# Patient Record
Sex: MALE | Race: WHITE | NOT HISPANIC OR LATINO
[De-identification: names, ages, dates, MRNs, and addresses within clinical notes are randomized per-mention and may not be internally consistent; named-entity substitution may affect disease eponyms.]

---

## 2017-06-08 PROBLEM — Z00.00 ENCOUNTER FOR PREVENTIVE HEALTH EXAMINATION: Status: ACTIVE | Noted: 2017-06-08

## 2017-06-14 ENCOUNTER — APPOINTMENT (OUTPATIENT)
Dept: VASCULAR SURGERY | Facility: CLINIC | Age: 82
End: 2017-06-14

## 2021-03-25 ENCOUNTER — EMERGENCY (EMERGENCY)
Facility: HOSPITAL | Age: 86
LOS: 0 days | Discharge: HOME | End: 2021-03-26
Attending: EMERGENCY MEDICINE | Admitting: EMERGENCY MEDICINE
Payer: MEDICARE

## 2021-03-25 VITALS
RESPIRATION RATE: 16 BRPM | HEART RATE: 67 BPM | OXYGEN SATURATION: 100 % | SYSTOLIC BLOOD PRESSURE: 139 MMHG | DIASTOLIC BLOOD PRESSURE: 65 MMHG | TEMPERATURE: 98 F

## 2021-03-25 VITALS
SYSTOLIC BLOOD PRESSURE: 151 MMHG | DIASTOLIC BLOOD PRESSURE: 66 MMHG | HEART RATE: 77 BPM | OXYGEN SATURATION: 97 % | WEIGHT: 115.08 LBS | RESPIRATION RATE: 18 BRPM | TEMPERATURE: 98 F

## 2021-03-25 DIAGNOSIS — Z85.46 PERSONAL HISTORY OF MALIGNANT NEOPLASM OF PROSTATE: ICD-10-CM

## 2021-03-25 DIAGNOSIS — R31.9 HEMATURIA, UNSPECIFIED: ICD-10-CM

## 2021-03-25 LAB
ALBUMIN SERPL ELPH-MCNC: 3.8 G/DL — SIGNIFICANT CHANGE UP (ref 3.5–5.2)
ALP SERPL-CCNC: 47 U/L — SIGNIFICANT CHANGE UP (ref 30–115)
ALT FLD-CCNC: 12 U/L — SIGNIFICANT CHANGE UP (ref 0–41)
ANION GAP SERPL CALC-SCNC: 11 MMOL/L — SIGNIFICANT CHANGE UP (ref 7–14)
APPEARANCE UR: ABNORMAL
AST SERPL-CCNC: 13 U/L — SIGNIFICANT CHANGE UP (ref 0–41)
BACTERIA # UR AUTO: NEGATIVE — SIGNIFICANT CHANGE UP
BASOPHILS # BLD AUTO: 0.06 K/UL — SIGNIFICANT CHANGE UP (ref 0–0.2)
BASOPHILS NFR BLD AUTO: 1 % — SIGNIFICANT CHANGE UP (ref 0–1)
BILIRUB SERPL-MCNC: <0.2 MG/DL — SIGNIFICANT CHANGE UP (ref 0.2–1.2)
BILIRUB UR-MCNC: NEGATIVE — SIGNIFICANT CHANGE UP
BUN SERPL-MCNC: 21 MG/DL — HIGH (ref 10–20)
CALCIUM SERPL-MCNC: 8.8 MG/DL — SIGNIFICANT CHANGE UP (ref 8.5–10.1)
CHLORIDE SERPL-SCNC: 103 MMOL/L — SIGNIFICANT CHANGE UP (ref 98–110)
CO2 SERPL-SCNC: 23 MMOL/L — SIGNIFICANT CHANGE UP (ref 17–32)
COLOR SPEC: ABNORMAL
CREAT SERPL-MCNC: 0.7 MG/DL — SIGNIFICANT CHANGE UP (ref 0.7–1.5)
DIFF PNL FLD: ABNORMAL
EOSINOPHIL # BLD AUTO: 0.1 K/UL — SIGNIFICANT CHANGE UP (ref 0–0.7)
EOSINOPHIL NFR BLD AUTO: 1.7 % — SIGNIFICANT CHANGE UP (ref 0–8)
EPI CELLS # UR: 2 /HPF — SIGNIFICANT CHANGE UP (ref 0–5)
GLUCOSE SERPL-MCNC: 241 MG/DL — HIGH (ref 70–99)
GLUCOSE UR QL: ABNORMAL
HCT VFR BLD CALC: 36.2 % — LOW (ref 42–52)
HGB BLD-MCNC: 11.3 G/DL — LOW (ref 14–18)
HYALINE CASTS # UR AUTO: 2 /LPF — SIGNIFICANT CHANGE UP (ref 0–7)
IMM GRANULOCYTES NFR BLD AUTO: 0.3 % — SIGNIFICANT CHANGE UP (ref 0.1–0.3)
KETONES UR-MCNC: SIGNIFICANT CHANGE UP
LACTATE SERPL-SCNC: 1.3 MMOL/L — SIGNIFICANT CHANGE UP (ref 0.7–2)
LEUKOCYTE ESTERASE UR-ACNC: NEGATIVE — SIGNIFICANT CHANGE UP
LIDOCAIN IGE QN: 25 U/L — SIGNIFICANT CHANGE UP (ref 7–60)
LYMPHOCYTES # BLD AUTO: 1.47 K/UL — SIGNIFICANT CHANGE UP (ref 1.2–3.4)
LYMPHOCYTES # BLD AUTO: 25 % — SIGNIFICANT CHANGE UP (ref 20.5–51.1)
MCHC RBC-ENTMCNC: 27.9 PG — SIGNIFICANT CHANGE UP (ref 27–31)
MCHC RBC-ENTMCNC: 31.2 G/DL — LOW (ref 32–37)
MCV RBC AUTO: 89.4 FL — SIGNIFICANT CHANGE UP (ref 80–94)
MONOCYTES # BLD AUTO: 0.5 K/UL — SIGNIFICANT CHANGE UP (ref 0.1–0.6)
MONOCYTES NFR BLD AUTO: 8.5 % — SIGNIFICANT CHANGE UP (ref 1.7–9.3)
NEUTROPHILS # BLD AUTO: 3.73 K/UL — SIGNIFICANT CHANGE UP (ref 1.4–6.5)
NEUTROPHILS NFR BLD AUTO: 63.5 % — SIGNIFICANT CHANGE UP (ref 42.2–75.2)
NITRITE UR-MCNC: NEGATIVE — SIGNIFICANT CHANGE UP
NRBC # BLD: 0 /100 WBCS — SIGNIFICANT CHANGE UP (ref 0–0)
PH UR: 6 — SIGNIFICANT CHANGE UP (ref 5–8)
PLATELET # BLD AUTO: 312 K/UL — SIGNIFICANT CHANGE UP (ref 130–400)
POTASSIUM SERPL-MCNC: 4.7 MMOL/L — SIGNIFICANT CHANGE UP (ref 3.5–5)
POTASSIUM SERPL-SCNC: 4.7 MMOL/L — SIGNIFICANT CHANGE UP (ref 3.5–5)
PROT SERPL-MCNC: 6.4 G/DL — SIGNIFICANT CHANGE UP (ref 6–8)
PROT UR-MCNC: ABNORMAL
RBC # BLD: 4.05 M/UL — LOW (ref 4.7–6.1)
RBC # FLD: 13.8 % — SIGNIFICANT CHANGE UP (ref 11.5–14.5)
RBC CASTS # UR COMP ASSIST: >720 /HPF — HIGH (ref 0–4)
SODIUM SERPL-SCNC: 137 MMOL/L — SIGNIFICANT CHANGE UP (ref 135–146)
SP GR SPEC: 1.03 — HIGH (ref 1.01–1.03)
UROBILINOGEN FLD QL: SIGNIFICANT CHANGE UP
WBC # BLD: 5.88 K/UL — SIGNIFICANT CHANGE UP (ref 4.8–10.8)
WBC # FLD AUTO: 5.88 K/UL — SIGNIFICANT CHANGE UP (ref 4.8–10.8)
WBC UR QL: 13 /HPF — HIGH (ref 0–5)

## 2021-03-25 PROCEDURE — 74177 CT ABD & PELVIS W/CONTRAST: CPT | Mod: 26

## 2021-03-25 PROCEDURE — 99284 EMERGENCY DEPT VISIT MOD MDM: CPT

## 2021-03-25 RX ORDER — SODIUM CHLORIDE 9 MG/ML
1000 INJECTION INTRAMUSCULAR; INTRAVENOUS; SUBCUTANEOUS ONCE
Refills: 0 | Status: COMPLETED | OUTPATIENT
Start: 2021-03-25 | End: 2021-03-25

## 2021-03-25 RX ADMIN — SODIUM CHLORIDE 1000 MILLILITER(S): 9 INJECTION INTRAMUSCULAR; INTRAVENOUS; SUBCUTANEOUS at 17:33

## 2021-03-25 NOTE — ED PROVIDER NOTE - NSFOLLOWUPINSTRUCTIONS_ED_ALL_ED_FT
Hematuria    WHAT YOU NEED TO KNOW:    What is hematuria? Hematuria is blood in your urine. Your urine may be bright red to dark brown.    What other signs and symptoms might I have with hematuria?   •Fever      •Nausea and vomiting      •Pain or bruising on your lower back or sides      •Pain or burning when you urinate      •More urination than usual, or the need to urinate right away      •Blood clots in the toilet after you urinate      What causes hematuria? Ask your healthcare provider for more information about these and other causes of hematuria:  •Urinary tract infection      •Kidney or bladder stones      •Swollen prostate      •Kidney disease      •Abdomen or pelvic injury      •Kidney, bladder, or prostate cancer      •Intense exercise      How is hematuria diagnosed? Your healthcare provider will ask when you first saw a change in the color of your urine. Tell him or her about any medical conditions or medicines you take. Some medicines can damage your kidneys or increase your risk for bleeding. You may need any of the following:  •Blood and urine tests may show infection and how well your kidneys are working.      •An ultrasound or CT may show the cause of your hematuria. You may be given contrast liquid to help your urinary tract show up better in the pictures. Tell the healthcare provider if you have ever had an allergic reaction to contrast liquid.      •A cystoscopy may show problems inside your bladder. The cystoscope is a long tube with a lens and a light on the end.      How is hematuria treated? Hematuria may go away without treatment. You may need medicines to treat an infection. Treatment depends on the cause of your hematuria. Ask your healthcare provider for more information about the treatment you may need.    How can I manage my symptoms? Drink liquids as directed. You may need to drink extra liquids to help flush the blood from your body through your urine. Water is the best liquid to drink. Ask how much liquid to drink each day and which liquids are best for you.    When should I seek immediate care?   •You have blood in your urine after a new injury, such as a fall.      •You have severe back or side pain that does not go away with treatment.      When should I call my doctor?   •You are urinating very small amounts or not at all.      •You feel like you cannot empty your bladder.      •You have a fever that gets worse or does not go away with treatment.      •You cannot keep liquids or medicines down.      •Your urine gets darker, even after you drink extra liquids.      •You have questions or concerns about your condition, treatment, or care.      CARE AGREEMENT:    You have the right to help plan your care. Learn about your health condition and how it may be treated. Discuss treatment options with your healthcare providers to decide what care you want to receive. You always have the right to refuse treatment.        © Copyright [employer] 2021

## 2021-03-25 NOTE — ED PROVIDER NOTE - OBJECTIVE STATEMENT
86 year old male with pmhx of dementia, and prostate CA in remission > 10 years ago, presents with hematuria x 1 day. as per family member noticed a few episodes of blood in urine. pt eating normally. no vomiting, diarrhea or fever. acting baseline.

## 2021-03-25 NOTE — ED PROVIDER NOTE - ATTENDING CONTRIBUTION TO CARE
I personally evaluated the patient. I reviewed the Resident’s or Physician Assistant’s note (as assigned above), and agree with the findings and plan except as documented in my note.    please see scribe note

## 2021-03-25 NOTE — ED PROVIDER NOTE - CARE PROVIDER_API CALL
Daniella Mast)  Urology  [address] [place] [address], [address]  [place] [address]  Phone: [phone]  Fax: [phone]  Follow Up Time:

## 2021-03-25 NOTE — ED PROVIDER NOTE - PHYSICAL EXAMINATION
CONST: Well appearing in NAD  EYES: PERRL, EOMI, Sclera and conjunctiva clear.   ENT: No nasal discharge. Oropharynx normal appearing, no erythema or exudates. No abscess or swelling. Uvula midline.  NECK: Non-tender, no meningeal signs. normal ROM. supple   CARD: Normal S1 S2; Normal rate and rhythm  RESP: Equal BS B/L, No wheezes, rhonchi or rales. No distress  GI: Soft, non-tender, non-distended. no cva tenderness. normal BS  MS: Normal ROM in all extremities. No midline spinal tenderness. pulses 2 +. no calf tenderness or swelling  SKIN: Warm, dry, no acute rashes. Good turgor  NEURO: A&O, No focal deficits. Strength 5/5 with no sensory deficits. Steady gait.

## 2021-03-25 NOTE — ED PROVIDER NOTE - PROGRESS NOTE DETAILS
Attending Note: I personally evaluated the patient. I reviewed the  Physician Assistant’s note (as assigned above), and agree with the findings and plan except as documented in my note.  85 y/o M with a hx of prostate CA in remission, presents to ED with hematuria since yesterday. No abdominal pain or flank pain. No nausea, vomiting.    PE: CONSTITUTIONAL: Well-developed; well-nourished; in no acute distress. Sitting up and providing appropriate history and physical examination SKIN: skin exam is warm and dry, no acute rash. HEAD: Normocephalic; atraumatic. EYES: PERRL, 3 mm bilateral, no nystagmus, EOM intact; conjunctiva and sclera clear. ENT: No nasal discharge; airway clear.  NECK: Supple; non tender.+ full passive ROM in all directions. No JVD CARD: S1, S2 normal; no murmurs, gallops, or rubs. Regular rate and rhythm. + Symmetric Strong Pulses  RESP: No wheezes, rales or rhonchi. Good air movement bilaterally ABD: soft; non-distended; non-tender. No Rebound, No Guarding, No signs of peritonitis, No CVA tenderness EXT: Normal ROM. No clubbing, cyanosis or edema. Dp and Pt Pulses intact. Cap refill less than 3 seconds NEURO: CN 2-12 intact, normal finger to nose, normal romberg, stable gait, no sensory or motor deficits, Alert, oriented, grossly unremarkable. No Focal deficits. GCS 15. NIH 0  PSYCH: Cooperative, appropriate.    Plan: Labs, UA, CT of abdomen and pelvis. Reassess. All results discussed with patient and family , will follow up with urology All results discussed with patient and family , will follow up with urology. Full DC instructions discussed and patient knows when to seek immediate medical attention. Patient has proper follow-up. All results discussed with the patient they may require further work-up. Limitations of ED work-up discussed. All  questions and concerns from patient or family addressed. Understanding of insturctions verbalized

## 2021-03-25 NOTE — ED PROVIDER NOTE - NS ED ROS FT
Review of Systems:  	•	CONSTITUTIONAL - no fever, no diaphoresis, no chills  	•	SKIN - no rash  	•	HEMATOLOGIC - no bleeding, no bruising  	•	EYES - no eye pain, no blurry vision  	•	ENT - no change in hearing, no sore throat, no ear pain or tinnitus  	•	RESPIRATORY - no shortness of breath, no cough  	•	CARDIAC - no chest pain, no palpitations  	•	GI - no abd pain, no nausea, no vomiting, no diarrhea, no constipation  	•	GENITO-URINARY - no discharge, no dysuria; + hematuria, no increased urinary frequency  	•	MUSCULOSKELETAL - no joint paint, no swelling, no redness  	•	NEUROLOGIC - no weakness, no headache, no paresthesias, no LOC

## 2021-03-25 NOTE — ED PROVIDER NOTE - PATIENT PORTAL LINK FT
You can access the FollowMyHealth Patient Portal offered by [facility] by registering at the following website: [email or web address]. By joining [employer]’s FollowMyHealth portal, you will also be able to view your health information using other applications (apps) compatible with our system.

## 2021-03-25 NOTE — ED ADULT NURSE NOTE - OBJECTIVE STATEMENT
Pt presents to ED with daughter with c/o hematuria x2-3 days. Daughter states hematuria worsened in the last 1 day. Pt denies abdominal pain, fever, N/V/D.

## 2021-03-26 LAB
CULTURE RESULTS: NO GROWTH — SIGNIFICANT CHANGE UP
SPECIMEN SOURCE: SIGNIFICANT CHANGE UP

## 2021-05-02 ENCOUNTER — INPATIENT (INPATIENT)
Facility: HOSPITAL | Age: 86
LOS: 1 days | Discharge: HOME HEALTH | End: 2021-05-04
Attending: INTERNAL MEDICINE | Admitting: INTERNAL MEDICINE
Payer: MEDICARE

## 2021-05-02 VITALS
DIASTOLIC BLOOD PRESSURE: 60 MMHG | WEIGHT: 124.34 LBS | RESPIRATION RATE: 20 BRPM | SYSTOLIC BLOOD PRESSURE: 129 MMHG | TEMPERATURE: 98 F | HEART RATE: 76 BPM | OXYGEN SATURATION: 99 % | HEIGHT: 61 IN

## 2021-05-02 PROBLEM — C61 MALIGNANT NEOPLASM OF PROSTATE: Chronic | Status: ACTIVE | Noted: 2021-03-25

## 2021-05-02 PROBLEM — F03.90 UNSPECIFIED DEMENTIA WITHOUT BEHAVIORAL DISTURBANCE: Chronic | Status: ACTIVE | Noted: 2021-03-25

## 2021-05-02 LAB
ALBUMIN SERPL ELPH-MCNC: 4.4 G/DL — SIGNIFICANT CHANGE UP (ref 3.5–5.2)
ALP SERPL-CCNC: 83 U/L — SIGNIFICANT CHANGE UP (ref 30–115)
ALT FLD-CCNC: 8 U/L — SIGNIFICANT CHANGE UP (ref 0–41)
ANION GAP SERPL CALC-SCNC: 11 MMOL/L — SIGNIFICANT CHANGE UP (ref 7–14)
APPEARANCE UR: CLEAR — SIGNIFICANT CHANGE UP
AST SERPL-CCNC: 7 U/L — SIGNIFICANT CHANGE UP (ref 0–41)
B-OH-BUTYR SERPL-SCNC: <0.2 MMOL/L — SIGNIFICANT CHANGE UP
BASE EXCESS BLDV CALC-SCNC: 4.8 MMOL/L — HIGH (ref -2–2)
BASOPHILS # BLD AUTO: 0.07 K/UL — SIGNIFICANT CHANGE UP (ref 0–0.2)
BASOPHILS NFR BLD AUTO: 1 % — SIGNIFICANT CHANGE UP (ref 0–1)
BILIRUB SERPL-MCNC: 0.4 MG/DL — SIGNIFICANT CHANGE UP (ref 0.2–1.2)
BILIRUB UR-MCNC: NEGATIVE — SIGNIFICANT CHANGE UP
BUN SERPL-MCNC: 23 MG/DL — HIGH (ref 10–20)
CA-I SERPL-SCNC: 1.18 MMOL/L — SIGNIFICANT CHANGE UP (ref 1.12–1.3)
CALCIUM SERPL-MCNC: 9.6 MG/DL — SIGNIFICANT CHANGE UP (ref 8.5–10.1)
CHLORIDE SERPL-SCNC: 95 MMOL/L — LOW (ref 98–110)
CO2 SERPL-SCNC: 24 MMOL/L — SIGNIFICANT CHANGE UP (ref 17–32)
COLOR SPEC: SIGNIFICANT CHANGE UP
CREAT SERPL-MCNC: 0.9 MG/DL — SIGNIFICANT CHANGE UP (ref 0.7–1.5)
DIFF PNL FLD: NEGATIVE — SIGNIFICANT CHANGE UP
EOSINOPHIL # BLD AUTO: 0.08 K/UL — SIGNIFICANT CHANGE UP (ref 0–0.7)
EOSINOPHIL NFR BLD AUTO: 1.2 % — SIGNIFICANT CHANGE UP (ref 0–8)
GAS PNL BLDV: 133 MMOL/L — LOW (ref 136–145)
GAS PNL BLDV: SIGNIFICANT CHANGE UP
GLUCOSE BLDC GLUCOMTR-MCNC: 182 MG/DL — HIGH (ref 70–99)
GLUCOSE BLDC GLUCOMTR-MCNC: 252 MG/DL — HIGH (ref 70–99)
GLUCOSE BLDC GLUCOMTR-MCNC: 311 MG/DL — HIGH (ref 70–99)
GLUCOSE SERPL-MCNC: 551 MG/DL — CRITICAL HIGH (ref 70–99)
GLUCOSE UR QL: ABNORMAL
HCO3 BLDV-SCNC: 32 MMOL/L — HIGH (ref 22–29)
HCT VFR BLD CALC: 39.8 % — LOW (ref 42–52)
HCT VFR BLDA CALC: 40.3 % — SIGNIFICANT CHANGE UP (ref 34–44)
HGB BLD CALC-MCNC: 13.1 G/DL — LOW (ref 14–18)
HGB BLD-MCNC: 13.2 G/DL — LOW (ref 14–18)
IMM GRANULOCYTES NFR BLD AUTO: 0.4 % — HIGH (ref 0.1–0.3)
KETONES UR-MCNC: ABNORMAL
LACTATE BLDV-MCNC: 1.3 MMOL/L — SIGNIFICANT CHANGE UP (ref 0.5–1.6)
LEUKOCYTE ESTERASE UR-ACNC: NEGATIVE — SIGNIFICANT CHANGE UP
LYMPHOCYTES # BLD AUTO: 1.39 K/UL — SIGNIFICANT CHANGE UP (ref 1.2–3.4)
LYMPHOCYTES # BLD AUTO: 20.7 % — SIGNIFICANT CHANGE UP (ref 20.5–51.1)
MAGNESIUM SERPL-MCNC: 1.9 MG/DL — SIGNIFICANT CHANGE UP (ref 1.8–2.4)
MCHC RBC-ENTMCNC: 28.4 PG — SIGNIFICANT CHANGE UP (ref 27–31)
MCHC RBC-ENTMCNC: 33.2 G/DL — SIGNIFICANT CHANGE UP (ref 32–37)
MCV RBC AUTO: 85.8 FL — SIGNIFICANT CHANGE UP (ref 80–94)
MONOCYTES # BLD AUTO: 0.35 K/UL — SIGNIFICANT CHANGE UP (ref 0.1–0.6)
MONOCYTES NFR BLD AUTO: 5.2 % — SIGNIFICANT CHANGE UP (ref 1.7–9.3)
NEUTROPHILS # BLD AUTO: 4.81 K/UL — SIGNIFICANT CHANGE UP (ref 1.4–6.5)
NEUTROPHILS NFR BLD AUTO: 71.5 % — SIGNIFICANT CHANGE UP (ref 42.2–75.2)
NITRITE UR-MCNC: NEGATIVE — SIGNIFICANT CHANGE UP
NRBC # BLD: 0 /100 WBCS — SIGNIFICANT CHANGE UP (ref 0–0)
OSMOLALITY SERPL: 307 MOS/KG — HIGH (ref 280–301)
PCO2 BLDV: 55 MMHG — HIGH (ref 41–51)
PH BLDV: 7.37 — SIGNIFICANT CHANGE UP (ref 7.26–7.43)
PH UR: 6.5 — SIGNIFICANT CHANGE UP (ref 5–8)
PHOSPHATE SERPL-MCNC: 4.5 MG/DL — SIGNIFICANT CHANGE UP (ref 2.1–4.9)
PLATELET # BLD AUTO: 299 K/UL — SIGNIFICANT CHANGE UP (ref 130–400)
PO2 BLDV: 21 MMHG — SIGNIFICANT CHANGE UP (ref 20–40)
POTASSIUM BLDV-SCNC: 4.9 MMOL/L — SIGNIFICANT CHANGE UP (ref 3.3–5.6)
POTASSIUM SERPL-MCNC: 5.1 MMOL/L — HIGH (ref 3.5–5)
POTASSIUM SERPL-SCNC: 5.1 MMOL/L — HIGH (ref 3.5–5)
PROT SERPL-MCNC: 7.3 G/DL — SIGNIFICANT CHANGE UP (ref 6–8)
PROT UR-MCNC: NEGATIVE — SIGNIFICANT CHANGE UP
RBC # BLD: 4.64 M/UL — LOW (ref 4.7–6.1)
RBC # FLD: 13 % — SIGNIFICANT CHANGE UP (ref 11.5–14.5)
SAO2 % BLDV: 30 % — SIGNIFICANT CHANGE UP
SARS-COV-2 RNA SPEC QL NAA+PROBE: SIGNIFICANT CHANGE UP
SODIUM SERPL-SCNC: 130 MMOL/L — LOW (ref 135–146)
SP GR SPEC: 1.03 — SIGNIFICANT CHANGE UP (ref 1.01–1.03)
UROBILINOGEN FLD QL: SIGNIFICANT CHANGE UP
WBC # BLD: 6.73 K/UL — SIGNIFICANT CHANGE UP (ref 4.8–10.8)
WBC # FLD AUTO: 6.73 K/UL — SIGNIFICANT CHANGE UP (ref 4.8–10.8)

## 2021-05-02 PROCEDURE — 99223 1ST HOSP IP/OBS HIGH 75: CPT

## 2021-05-02 PROCEDURE — 93010 ELECTROCARDIOGRAM REPORT: CPT

## 2021-05-02 PROCEDURE — 99285 EMERGENCY DEPT VISIT HI MDM: CPT | Mod: CS,GC

## 2021-05-02 PROCEDURE — 70450 CT HEAD/BRAIN W/O DYE: CPT | Mod: 26

## 2021-05-02 PROCEDURE — 71045 X-RAY EXAM CHEST 1 VIEW: CPT | Mod: 26

## 2021-05-02 RX ORDER — HALOPERIDOL DECANOATE 100 MG/ML
1 INJECTION INTRAMUSCULAR ONCE
Refills: 0 | Status: COMPLETED | OUTPATIENT
Start: 2021-05-02 | End: 2021-05-02

## 2021-05-02 RX ORDER — SODIUM CHLORIDE 9 MG/ML
1000 INJECTION, SOLUTION INTRAVENOUS ONCE
Refills: 0 | Status: COMPLETED | OUTPATIENT
Start: 2021-05-02 | End: 2021-05-02

## 2021-05-02 RX ORDER — HUMAN INSULIN 100 [IU]/ML
15 INJECTION, SUSPENSION SUBCUTANEOUS
Refills: 0 | Status: DISCONTINUED | OUTPATIENT
Start: 2021-05-02 | End: 2021-05-03

## 2021-05-02 RX ORDER — INSULIN LISPRO 100/ML
5 VIAL (ML) SUBCUTANEOUS
Refills: 0 | Status: DISCONTINUED | OUTPATIENT
Start: 2021-05-02 | End: 2021-05-04

## 2021-05-02 RX ORDER — SODIUM CHLORIDE 9 MG/ML
1000 INJECTION, SOLUTION INTRAVENOUS
Refills: 0 | Status: DISCONTINUED | OUTPATIENT
Start: 2021-05-02 | End: 2021-05-04

## 2021-05-02 RX ORDER — DEXTROSE 50 % IN WATER 50 %
15 SYRINGE (ML) INTRAVENOUS ONCE
Refills: 0 | Status: DISCONTINUED | OUTPATIENT
Start: 2021-05-02 | End: 2021-05-04

## 2021-05-02 RX ORDER — DEXTROSE 50 % IN WATER 50 %
25 SYRINGE (ML) INTRAVENOUS ONCE
Refills: 0 | Status: DISCONTINUED | OUTPATIENT
Start: 2021-05-02 | End: 2021-05-04

## 2021-05-02 RX ORDER — LANOLIN ALCOHOL/MO/W.PET/CERES
5 CREAM (GRAM) TOPICAL AT BEDTIME
Refills: 0 | Status: DISCONTINUED | OUTPATIENT
Start: 2021-05-02 | End: 2021-05-04

## 2021-05-02 RX ORDER — DEXTROSE 50 % IN WATER 50 %
12.5 SYRINGE (ML) INTRAVENOUS ONCE
Refills: 0 | Status: DISCONTINUED | OUTPATIENT
Start: 2021-05-02 | End: 2021-05-04

## 2021-05-02 RX ORDER — INSULIN LISPRO 100/ML
VIAL (ML) SUBCUTANEOUS
Refills: 0 | Status: DISCONTINUED | OUTPATIENT
Start: 2021-05-02 | End: 2021-05-04

## 2021-05-02 RX ORDER — INSULIN HUMAN 100 [IU]/ML
10 INJECTION, SOLUTION SUBCUTANEOUS ONCE
Refills: 0 | Status: COMPLETED | OUTPATIENT
Start: 2021-05-02 | End: 2021-05-02

## 2021-05-02 RX ORDER — HALOPERIDOL DECANOATE 100 MG/ML
1 INJECTION INTRAMUSCULAR EVERY 6 HOURS
Refills: 0 | Status: DISCONTINUED | OUTPATIENT
Start: 2021-05-02 | End: 2021-05-04

## 2021-05-02 RX ORDER — HALOPERIDOL DECANOATE 100 MG/ML
1 INJECTION INTRAMUSCULAR
Qty: 0 | Refills: 0 | DISCHARGE

## 2021-05-02 RX ORDER — ENOXAPARIN SODIUM 100 MG/ML
40 INJECTION SUBCUTANEOUS DAILY
Refills: 0 | Status: DISCONTINUED | OUTPATIENT
Start: 2021-05-02 | End: 2021-05-02

## 2021-05-02 RX ORDER — ENOXAPARIN SODIUM 100 MG/ML
40 INJECTION SUBCUTANEOUS DAILY
Refills: 0 | Status: DISCONTINUED | OUTPATIENT
Start: 2021-05-02 | End: 2021-05-04

## 2021-05-02 RX ORDER — GLUCAGON INJECTION, SOLUTION 0.5 MG/.1ML
1 INJECTION, SOLUTION SUBCUTANEOUS ONCE
Refills: 0 | Status: DISCONTINUED | OUTPATIENT
Start: 2021-05-02 | End: 2021-05-04

## 2021-05-02 RX ADMIN — SODIUM CHLORIDE 1000 MILLILITER(S): 9 INJECTION, SOLUTION INTRAVENOUS at 12:17

## 2021-05-02 RX ADMIN — SODIUM CHLORIDE 1000 MILLILITER(S): 9 INJECTION, SOLUTION INTRAVENOUS at 11:11

## 2021-05-02 RX ADMIN — SODIUM CHLORIDE 1000 MILLILITER(S): 9 INJECTION, SOLUTION INTRAVENOUS at 11:30

## 2021-05-02 RX ADMIN — INSULIN HUMAN 10 UNIT(S): 100 INJECTION, SOLUTION SUBCUTANEOUS at 12:48

## 2021-05-02 RX ADMIN — HALOPERIDOL DECANOATE 1 MILLIGRAM(S): 100 INJECTION INTRAMUSCULAR at 22:02

## 2021-05-02 RX ADMIN — Medication 5 UNIT(S): at 18:22

## 2021-05-02 RX ADMIN — Medication 4: at 18:22

## 2021-05-02 RX ADMIN — HALOPERIDOL DECANOATE 1 MILLIGRAM(S): 100 INJECTION INTRAMUSCULAR at 15:44

## 2021-05-02 NOTE — H&P ADULT - ASSESSMENT
86y M pmh insulin dependent diabetes, prostate ca in remission, late stage alzheimer's presenting for elevated sugars x1 week.    # Hyperglycemia, no HHS or DKA   - pt's DM medication regimen needs to be changed as he is only getting long acting insulin in am  - check hba1c, lipid profile  - FS in am and pre-meals  - beta-hydroxy unremarkable  - small ketones in urine  - serum osm 307, pH 7.37, cCo2 elevated 55  - lipase unremarkable, UA negative   - s/p 10 U IV insulin in ED  - insulin long acting 15 qhs, 5 pre-meals tid     # advanced dementia   - c/w aricept, daughter unsure of dose. Does not recall pharmacy  - will have to clarify dose   - pt has episodes of agitation. Give Haldol IM 1 mg prn   - Melatonin 5 at night     # h/o prostate ca s/p RT    Diet: DASH/carb consistent   DVT ppx: lovenox   Dispo: d/c in 24 hours .

## 2021-05-02 NOTE — ED ADULT NURSE REASSESSMENT NOTE - NS ED NURSE REASSESS COMMENT FT1
Pt restless/aggresive towards staff. Pt pulled out PVL. Daughter at bedside. MD Krishna at bedside. 1:1 initiated.

## 2021-05-02 NOTE — ED PROVIDER NOTE - CLINICAL SUMMARY MEDICAL DECISION MAKING FREE TEXT BOX
no dka on labs. ua/cth neg. admit for further work up and treatment of ams, uncontrolled hyperglycemia

## 2021-05-02 NOTE — ED ADULT NURSE NOTE - NSIMPLEMENTINTERV_GEN_ALL_ED
Implemented All Fall Risk Interventions:  [place] to call system. Call bell, personal items and telephone within reach. Instruct patient to call for assistance. Room bathroom lighting operational. Non-slip footwear when patient is off stretcher. Physically safe environment: no spills, clutter or unnecessary equipment. Stretcher in lowest position, wheels locked, appropriate side rails in place. Provide visual cue, wrist band, yellow gown, etc. Monitor gait and stability. Monitor for mental status changes and reorient to person, place, and time. Review medications for side effects contributing to fall risk. Reinforce activity limits and safety measures with patient and family.

## 2021-05-02 NOTE — H&P ADULT - NSHPSOCIALHISTORY_GEN_ALL_CORE
EtOH use many years prior   Denies smoking, drug use     Lives at home with daughter who is his home health aide.  He wonders outside often, has moments of confusion often

## 2021-05-02 NOTE — H&P ADULT - NSHPLABSRESULTS_GEN_ALL_CORE
Daily Height in cm: 154.94 (02 May 2021 10:50)    Daily     LABS:                        13.2   6.73  )-----------( 299      ( 02 May 2021 11:19 )             39.8     05-02    130<L>  |  95<L>  |  23<H>  ----------------------------<  551<HH>  5.1<H>   |  24  |  0.9    Ca    9.6      02 May 2021 11:19  Phos  4.5     05-  Mg     1.9     05-    TPro  7.3  /  Alb  4.4  /  TBili  0.4  /  DBili  x   /  AST  7   /  ALT  8   /  AlkPhos  83  05-02      Urinalysis Basic - ( 02 May 2021 13:45 )    Color: Light Yellow / Appearance: Clear / S.030 / pH: x  Gluc: x / Ketone: Small  / Bili: Negative / Urobili: <2 mg/dL   Blood: x / Protein: Negative / Nitrite: Negative   Leuk Esterase: Negative / RBC: x / WBC x   Sq Epi: x / Non Sq Epi: x / Bacteria: x      CAPILLARY BLOOD GLUCOSE      POCT Blood Glucose.: 252 mg/dL (02 May 2021 14:43)  POCT Blood Glucose.: 542 mg/dL (02 May 2021 12:41)  POCT Blood Glucose.: >600 mg/dL (02 May 2021 10:51)      RADIOLOGY & ADDITIONAL TESTS:

## 2021-05-02 NOTE — ED PROVIDER NOTE - NS ED ROS FT
Eyes:  No visual changes, eye pain or discharge.  ENMT:  No hearing changes, pain, no sore throat or runny nose, no difficulty swallowing  Cardiac:  No chest pain, SOB or edema. No chest pain with exertion.  Respiratory:  No cough or respiratory distress. No hemoptysis. No history of asthma or RAD.  GI:  No nausea, vomiting, diarrhea or abdominal pain.  :  see HPI  MS:  No myalgia, muscle weakness, joint pain or back pain.  Neuro:  No headache or weakness.  No LOC.  Skin:  No skin rash.   Endocrine: No history of thyroid disease or diabetes.

## 2021-05-02 NOTE — ED ADULT NURSE REASSESSMENT NOTE - NS ED NURSE REASSESS COMMENT FT1
MD Krishna at bedside. MD Krishna made aware of pt pt kicking PCA on chest. Charge RN Breanna made aware. As per MD "Give 1mg Haldol". Pt actively punching and scratching staff .

## 2021-05-02 NOTE — ED PROVIDER NOTE - OBJECTIVE STATEMENT
86y M pmh insulin dependent diabetes, prostate ca in remission, alzheimer's presenting for elevated sugars x1 week. Hx provided by daughter who says his sugars whenever he checks has been consistently over 500. Has been giving him long acting insulin in the morning 35-40 units. When she gave him higher than 40 last week, he had 2 episodes of hypoglycemia when his sugar was 30. +episodes of incontinence which is new/different for him. Increase in confusion. But no f/c/n/v. No cough/sore throat. No chest pain/sob. No recent travel/sick contacts.

## 2021-05-02 NOTE — H&P ADULT - HISTORY OF PRESENT ILLNESS
86y M pmh insulin dependent diabetes, prostate ca in remission, late stage alzheimer's presenting for elevated sugars x1 week. Hx provided by daughter who says his sugars whenever he checks has been consistently over 500. Has been giving him long acting insulin in the morning 35-40 units. When she gave him higher than 40 last week, he had 2 episodes of hypoglycemia when his sugar was 30. +episodes of incontinence which is new/different for him. Increase in confusion. According to his daughter, the pt has not experienced any fevers, chills, denies n/v, sick contacts, any complaints.     In the ED, BG was >600. 10 U IV Insulin given, rpt . The pt was given IVF 2L bolus. While in ED, pt had agressive episode and kicked the PCA. He was given Haldol 1 mg x2.       ED Vital Signs Last 24 Hrs  T(C): 36.7 (02 May 2021 10:50), Max: 36.7 (02 May 2021 10:50)  T(F): 98 (02 May 2021 10:50), Max: 98 (02 May 2021 10:50)  HR: 86 (02 May 2021 14:18) (66 - 86)  BP: 135/74 (02 May 2021 14:18) (129/60 - 150/67)  RR: 18 (02 May 2021 14:18) (18 - 20)  SpO2: 98% (02 May 2021 14:18) (98% - 99%)

## 2021-05-02 NOTE — H&P ADULT - NSHPPHYSICALEXAM_GEN_ALL_CORE
GENERAL: confused, agitated   HEAD:  Atraumatic, Normocephalic  EYES: EOMI, PERRLA, conjunctiva and sclera clear  NECK: Supple, no lymphadenopathy, no JVD  CHEST/LUNG: CTAB; No wheezes, rales, or rhonchi  HEART: Regular rate and rhythm; No murmurs, rubs, or gallops  ABDOMEN: Soft, non-tender, non-distended; normal bowel sounds, no organomegaly  EXTREMITIES:  2+ peripheral pulses b/l, No clubbing, cyanosis, or edema  NEUROLOGY: A&O x 1, no focal deficits  SKIN: No rashes or lesions

## 2021-05-02 NOTE — ED ADULT NURSE REASSESSMENT NOTE - NS ED NURSE REASSESS COMMENT FT1
MD Ribera made aware, security at bedside. Pt to be restraint. Pt unable to be redirected. family at bedside.

## 2021-05-02 NOTE — ED PROVIDER NOTE - ATTENDING CONTRIBUTION TO CARE
86M pmh advanced dementia, IDDM, presents for uncontrolled hyperglycemia x 1 week. assoc w confusion, urinating on the floor which is unusual for him. daughter at bedside giving H&P due to pt's dementia and translating Slovak. no fever, cough. no n/v. no pain to body. no fall. no loc. no dysuria, freq, hematuria.     on exam, AFVSS, well ginny nad, ncat, eomi, perrla, mmm, lctab, rrr nl s1s2 no mrg, abd soft ntnd, alert, no focal deficits, no le edema or calf ttp,     a/p; Hyperglycemia r/o DKA. ams, delirium vs dementia. will do labs ekg, cxr, UA/Ucx, CTH, ivf, insulin re-eval.

## 2021-05-03 ENCOUNTER — TRANSCRIPTION ENCOUNTER (OUTPATIENT)
Age: 86
End: 2021-05-03

## 2021-05-03 LAB
A1C WITH ESTIMATED AVERAGE GLUCOSE RESULT: 12.1 % — HIGH (ref 4–5.6)
ALBUMIN SERPL ELPH-MCNC: 3.9 G/DL — SIGNIFICANT CHANGE UP (ref 3.5–5.2)
ALP SERPL-CCNC: 80 U/L — SIGNIFICANT CHANGE UP (ref 30–115)
ALT FLD-CCNC: 9 U/L — SIGNIFICANT CHANGE UP (ref 0–41)
ANION GAP SERPL CALC-SCNC: 14 MMOL/L — SIGNIFICANT CHANGE UP (ref 7–14)
AST SERPL-CCNC: 15 U/L — SIGNIFICANT CHANGE UP (ref 0–41)
BASOPHILS # BLD AUTO: 0.09 K/UL — SIGNIFICANT CHANGE UP (ref 0–0.2)
BASOPHILS NFR BLD AUTO: 1.1 % — HIGH (ref 0–1)
BILIRUB SERPL-MCNC: 0.5 MG/DL — SIGNIFICANT CHANGE UP (ref 0.2–1.2)
BUN SERPL-MCNC: 15 MG/DL — SIGNIFICANT CHANGE UP (ref 10–20)
CALCIUM SERPL-MCNC: 8.8 MG/DL — SIGNIFICANT CHANGE UP (ref 8.5–10.1)
CHLORIDE SERPL-SCNC: 99 MMOL/L — SIGNIFICANT CHANGE UP (ref 98–110)
CHOLEST SERPL-MCNC: 141 MG/DL — SIGNIFICANT CHANGE UP
CO2 SERPL-SCNC: 23 MMOL/L — SIGNIFICANT CHANGE UP (ref 17–32)
COVID-19 SPIKE DOMAIN AB INTERP: NEGATIVE — SIGNIFICANT CHANGE UP
COVID-19 SPIKE DOMAIN ANTIBODY RESULT: 0.4 U/ML — SIGNIFICANT CHANGE UP
CREAT SERPL-MCNC: 0.9 MG/DL — SIGNIFICANT CHANGE UP (ref 0.7–1.5)
CULTURE RESULTS: SIGNIFICANT CHANGE UP
EOSINOPHIL # BLD AUTO: 0.06 K/UL — SIGNIFICANT CHANGE UP (ref 0–0.7)
EOSINOPHIL NFR BLD AUTO: 0.8 % — SIGNIFICANT CHANGE UP (ref 0–8)
ESTIMATED AVERAGE GLUCOSE: 301 MG/DL — HIGH (ref 68–114)
GLUCOSE BLDC GLUCOMTR-MCNC: 289 MG/DL — HIGH (ref 70–99)
GLUCOSE BLDC GLUCOMTR-MCNC: 304 MG/DL — HIGH (ref 70–99)
GLUCOSE BLDC GLUCOMTR-MCNC: 388 MG/DL — HIGH (ref 70–99)
GLUCOSE BLDC GLUCOMTR-MCNC: 75 MG/DL — SIGNIFICANT CHANGE UP (ref 70–99)
GLUCOSE SERPL-MCNC: 328 MG/DL — HIGH (ref 70–99)
HCT VFR BLD CALC: 40.8 % — LOW (ref 42–52)
HDLC SERPL-MCNC: 49 MG/DL — SIGNIFICANT CHANGE UP
HGB BLD-MCNC: 13.5 G/DL — LOW (ref 14–18)
IMM GRANULOCYTES NFR BLD AUTO: 0.4 % — HIGH (ref 0.1–0.3)
LIPID PNL WITH DIRECT LDL SERPL: 86 MG/DL — SIGNIFICANT CHANGE UP
LYMPHOCYTES # BLD AUTO: 1.2 K/UL — SIGNIFICANT CHANGE UP (ref 1.2–3.4)
LYMPHOCYTES # BLD AUTO: 15.2 % — LOW (ref 20.5–51.1)
MAGNESIUM SERPL-MCNC: 1.6 MG/DL — LOW (ref 1.8–2.4)
MCHC RBC-ENTMCNC: 28.4 PG — SIGNIFICANT CHANGE UP (ref 27–31)
MCHC RBC-ENTMCNC: 33.1 G/DL — SIGNIFICANT CHANGE UP (ref 32–37)
MCV RBC AUTO: 85.7 FL — SIGNIFICANT CHANGE UP (ref 80–94)
MONOCYTES # BLD AUTO: 0.42 K/UL — SIGNIFICANT CHANGE UP (ref 0.1–0.6)
MONOCYTES NFR BLD AUTO: 5.3 % — SIGNIFICANT CHANGE UP (ref 1.7–9.3)
NEUTROPHILS # BLD AUTO: 6.11 K/UL — SIGNIFICANT CHANGE UP (ref 1.4–6.5)
NEUTROPHILS NFR BLD AUTO: 77.2 % — HIGH (ref 42.2–75.2)
NON HDL CHOLESTEROL: 92 MG/DL — SIGNIFICANT CHANGE UP
NRBC # BLD: 0 /100 WBCS — SIGNIFICANT CHANGE UP (ref 0–0)
PLATELET # BLD AUTO: 302 K/UL — SIGNIFICANT CHANGE UP (ref 130–400)
POTASSIUM SERPL-MCNC: 5 MMOL/L — SIGNIFICANT CHANGE UP (ref 3.5–5)
POTASSIUM SERPL-SCNC: 5 MMOL/L — SIGNIFICANT CHANGE UP (ref 3.5–5)
PROT SERPL-MCNC: 6.8 G/DL — SIGNIFICANT CHANGE UP (ref 6–8)
RBC # BLD: 4.76 M/UL — SIGNIFICANT CHANGE UP (ref 4.7–6.1)
RBC # FLD: 13 % — SIGNIFICANT CHANGE UP (ref 11.5–14.5)
SARS-COV-2 IGG+IGM SERPL QL IA: 0.4 U/ML — SIGNIFICANT CHANGE UP
SARS-COV-2 IGG+IGM SERPL QL IA: NEGATIVE — SIGNIFICANT CHANGE UP
SODIUM SERPL-SCNC: 136 MMOL/L — SIGNIFICANT CHANGE UP (ref 135–146)
SPECIMEN SOURCE: SIGNIFICANT CHANGE UP
TRIGL SERPL-MCNC: 61 MG/DL — SIGNIFICANT CHANGE UP
WBC # BLD: 7.91 K/UL — SIGNIFICANT CHANGE UP (ref 4.8–10.8)
WBC # FLD AUTO: 7.91 K/UL — SIGNIFICANT CHANGE UP (ref 4.8–10.8)

## 2021-05-03 PROCEDURE — 99233 SBSQ HOSP IP/OBS HIGH 50: CPT

## 2021-05-03 RX ORDER — INSULIN GLARGINE 100 [IU]/ML
15 INJECTION, SOLUTION SUBCUTANEOUS AT BEDTIME
Refills: 0 | Status: DISCONTINUED | OUTPATIENT
Start: 2021-05-03 | End: 2021-05-03

## 2021-05-03 RX ORDER — INSULIN GLARGINE 100 [IU]/ML
15 INJECTION, SOLUTION SUBCUTANEOUS EVERY MORNING
Refills: 0 | Status: DISCONTINUED | OUTPATIENT
Start: 2021-05-03 | End: 2021-05-04

## 2021-05-03 RX ORDER — POLYETHYLENE GLYCOL 3350 17 G/17G
17 POWDER, FOR SOLUTION ORAL DAILY
Refills: 0 | Status: DISCONTINUED | OUTPATIENT
Start: 2021-05-03 | End: 2021-05-04

## 2021-05-03 RX ADMIN — INSULIN GLARGINE 15 UNIT(S): 100 INJECTION, SOLUTION SUBCUTANEOUS at 10:10

## 2021-05-03 RX ADMIN — Medication 5 UNIT(S): at 08:24

## 2021-05-03 RX ADMIN — ENOXAPARIN SODIUM 40 MILLIGRAM(S): 100 INJECTION SUBCUTANEOUS at 11:49

## 2021-05-03 RX ADMIN — Medication 5: at 11:49

## 2021-05-03 RX ADMIN — Medication 4: at 16:35

## 2021-05-03 RX ADMIN — Medication 5 UNIT(S): at 16:35

## 2021-05-03 RX ADMIN — Medication 3: at 08:25

## 2021-05-03 RX ADMIN — Medication 5 UNIT(S): at 11:49

## 2021-05-03 RX ADMIN — POLYETHYLENE GLYCOL 3350 17 GRAM(S): 17 POWDER, FOR SOLUTION ORAL at 15:16

## 2021-05-03 NOTE — DISCHARGE NOTE NURSING/CASE MANAGEMENT/SOCIAL WORK - PATIENT PORTAL LINK FT
You can access the FollowMyHealth Patient Portal offered by [facility] by registering at the following website: [email or web address]. By joining [employer]’s FollowMyHealth portal, you will also be able to view your health information using other applications (apps) compatible with our system.

## 2021-05-03 NOTE — PROGRESS NOTE ADULT - SUBJECTIVE AND OBJECTIVE BOX
SUBJECTIVE:  HPI:  86y M pmh insulin dependent diabetes, prostate ca in remission, late stage alzheimer's presenting for elevated sugars x1 week. Hx provided by daughter who says his sugars whenever he checks has been consistently over 500. Has been giving him long acting insulin in the morning 35-40 units. When she gave him higher than 40 last week, he had 2 episodes of hypoglycemia when his sugar was 30. +episodes of incontinence which is new/different for him. Increase in confusion. According to his daughter, the pt has not experienced any fevers, chills, denies n/v, sick contacts, any complaints.     In the ED, BG was >600. 10 U IV Insulin given, rpt . The pt was given IVF 2L bolus. While in ED, pt had agressive episode and kicked the PCA. He was given Haldol 1 mg x2.       ED Vital Signs Last 24 Hrs  T(C): 36.7 (02 May 2021 10:50), Max: 36.7 (02 May 2021 10:50)  T(F): 98 (02 May 2021 10:50), Max: 98 (02 May 2021 10:50)  HR: 86 (02 May 2021 14:18) (66 - 86)  BP: 135/74 (02 May 2021 14:18) (129/60 - 150/67)  RR: 18 (02 May 2021 14:18) (18 - 20)  SpO2: 98% (02 May 2021 14:18) (98% - 99%)   (02 May 2021 15:05)      PAST MEDICAL & SURGICAL HISTORY  PAST MEDICAL & SURGICAL HISTORY:  Prostate cancer    Dementia    Diabetes      SOCIAL HISTORY:    ALLERGIES:  No Known Allergies    MEDICATIONS:  STANDING MEDICATIONS  dextrose 40% Gel 15 Gram(s) Oral once  dextrose 5%. 1000 milliLiter(s) IV Continuous <Continuous>  dextrose 5%. 1000 milliLiter(s) IV Continuous <Continuous>  dextrose 50% Injectable 25 Gram(s) IV Push once  dextrose 50% Injectable 12.5 Gram(s) IV Push once  dextrose 50% Injectable 25 Gram(s) IV Push once  enoxaparin Injectable 40 milliGRAM(s) SubCutaneous daily  glucagon  Injectable 1 milliGRAM(s) IntraMuscular once  insulin glargine Injectable (LANTUS) 15 Unit(s) SubCutaneous every morning  insulin lispro (ADMELOG) corrective regimen sliding scale   SubCutaneous three times a day before meals  insulin lispro Injectable (ADMELOG) 5 Unit(s) SubCutaneous three times a day before meals  melatonin 5 milliGRAM(s) Oral at bedtime  polyethylene glycol 3350 17 Gram(s) Oral daily    PRN MEDICATIONS  haloperidol    Injectable 1 milliGRAM(s) IntraMuscular every 6 hours PRN    VITALS:   T(F): 96.7  HR: 105  BP: 122/60  RR: 18  SpO2: --    LABS:                        13.5   7.91  )-----------( 302      ( 03 May 2021 04:30 )             40.8     05-03    136  |  99  |  15  ----------------------------<  328<H>  5.0   |  23  |  0.9    Ca    8.8      03 May 2021 04:30  Phos  4.5     05-02  Mg     1.6     05-03    TPro  6.8  /  Alb  3.9  /  TBili  0.5  /  DBili  x   /  AST  15  /  ALT  9   /  AlkPhos  80  05-03      Urinalysis Basic - ( 02 May 2021 13:45 )    Color: Light Yellow / Appearance: Clear / S.030 / pH: x  Gluc: x / Ketone: Small  / Bili: Negative / Urobili: <2 mg/dL   Blood: x / Protein: Negative / Nitrite: Negative   Leuk Esterase: Negative / RBC: x / WBC x   Sq Epi: x / Non Sq Epi: x / Bacteria: x        Culture - Urine (collected 02 May 2021 13:45)  Source: .Urine Clean Catch (Midstream)  Final Report (03 May 2021 14:41):  >=3 organisms. Probable collection contamination      RADIOLOGY:    PHYSICAL EXAM:  GEN: No acute distress  HEENT: AT/NC PEERLA, EOMI  LUNGS: Clear to auscultation bilaterally  HEART: S1/S2 present  ABD: Soft, non-tender  EXT: No edema, no rashes, no cyanosis  NEURO: AAOX1 SUBJECTIVE:  HPI:  86y M pmh insulin dependent diabetes, prostate ca in remission, late stage alzheimer's presenting for elevated sugars x1 week. Hx provided by daughter who says his sugars whenever he checks has been consistently over 500. Has been giving him long acting insulin in the morning 35-40 units. When she gave him higher than 40 last week, he had 2 episodes of hypoglycemia when his sugar was 30. +episodes of incontinence which is new/different for him. Increase in confusion. According to his daughter, the pt has not experienced any fevers, chills, denies n/v, sick contacts, any complaints.     In the ED, BG was >600. 10 U IV Insulin given, rpt . The pt was given IVF 2L bolus. While in ED, pt had agressive episode and kicked the PCA. He was given Haldol 1 mg x2.       ED Vital Signs Last 24 Hrs  T(C): 36.7 (02 May 2021 10:50), Max: 36.7 (02 May 2021 10:50)  T(F): 98 (02 May 2021 10:50), Max: 98 (02 May 2021 10:50)  HR: 86 (02 May 2021 14:18) (66 - 86)  BP: 135/74 (02 May 2021 14:18) (129/60 - 150/67)  RR: 18 (02 May 2021 14:18) (18 - 20)  SpO2: 98% (02 May 2021 14:18) (98% - 99%)   (02 May 2021 15:05)      PAST MEDICAL & SURGICAL HISTORY  PAST MEDICAL & SURGICAL HISTORY:  Prostate cancer    Dementia    Diabetes      SOCIAL HISTORY:    ALLERGIES:  No Known Allergies    MEDICATIONS:  STANDING MEDICATIONS  dextrose 40% Gel 15 Gram(s) Oral once  dextrose 5%. 1000 milliLiter(s) IV Continuous <Continuous>  dextrose 5%. 1000 milliLiter(s) IV Continuous <Continuous>  dextrose 50% Injectable 25 Gram(s) IV Push once  dextrose 50% Injectable 12.5 Gram(s) IV Push once  dextrose 50% Injectable 25 Gram(s) IV Push once  enoxaparin Injectable 40 milliGRAM(s) SubCutaneous daily  glucagon  Injectable 1 milliGRAM(s) IntraMuscular once  insulin glargine Injectable (LANTUS) 15 Unit(s) SubCutaneous every morning  insulin lispro (ADMELOG) corrective regimen sliding scale   SubCutaneous three times a day before meals  insulin lispro Injectable (ADMELOG) 5 Unit(s) SubCutaneous three times a day before meals  melatonin 5 milliGRAM(s) Oral at bedtime  polyethylene glycol 3350 17 Gram(s) Oral daily    PRN MEDICATIONS  haloperidol    Injectable 1 milliGRAM(s) IntraMuscular every 6 hours PRN    VITALS:     T(C): 35.9 (03 May 2021 13:49), Max: 36.3 (03 May 2021 06:53)  T(F): 96.7 (03 May 2021 13:49), Max: 97.3 (03 May 2021 06:53)  HR: 105 (03 May 2021 13:49) (105 - 107)  BP: 122/60 (03 May 2021 13:49) (122/60 - 158/97)  RR: 18 (03 May 2021 13:49) (18 - 18)    LABS:                        13.5   7.91  )-----------( 302      ( 03 May 2021 04:30 )             40.8     05-03    136  |  99  |  15  ----------------------------<  328<H>  5.0   |  23  |  0.9    Ca    8.8      03 May 2021 04:30  Phos  4.5     05-02  Mg     1.6     05-03    TPro  6.8  /  Alb  3.9  /  TBili  0.5  /  DBili  x   /  AST  15  /  ALT  9   /  AlkPhos  80  05-03    Urinalysis Basic - ( 02 May 2021 13:45 )    Color: Light Yellow / Appearance: Clear / S.030 / pH: x  Gluc: x / Ketone: Small  / Bili: Negative / Urobili: <2 mg/dL   Blood: x / Protein: Negative / Nitrite: Negative   Leuk Esterase: Negative / RBC: x / WBC x   Sq Epi: x / Non Sq Epi: x / Bacteria: x    Culture - Urine (collected 02 May 2021 13:45)  Source: .Urine Clean Catch (Midstream)  Final Report (03 May 2021 14:41):  >=3 organisms. Probable collection contamination      PHYSICAL EXAM:  GEN: No acute distress  HEENT: AT/NC PEERLA, EOMI  LUNGS: Clear to auscultation bilaterally  HEART: S1/S2 present  ABD: Soft, non-tender  EXT: No edema, no rashes, no cyanosis  NEURO: AAOX1

## 2021-05-03 NOTE — PROGRESS NOTE ADULT - ASSESSMENT
86y M pmh insulin dependent diabetes, prostate ca in remission, late stage alzheimer's presenting for elevated sugars x1 week.    # Hyperglycemia, no HHS or DKA   - A1C 12.1  - FS in am and pre-meals  - beta-hydroxy unremarkable, small ketones in urine  - serum osm 307, pH 7.37, cCo2 elevated 55  - lipase unremarkable, UA negative   - insulin long acting 15 qhs, 5 pre-meals tid, ss    #Constipation  - CTAP: large stool burden   - Will start Miralax/Lactulose    # Advanced Dementia   - c/w Aricept, daughter unsure of dose. Does not recall pharmacy  - will have to clarify dose   - pt has episodes of agitation. Give Haldol IM 1 mg prn   - Melatonin 5 at night     # h/o prostate ca s/p RT    Diet: DASH/carb consistent   DVT ppx: Lovenox   Dispo: d/c in 24 hours, PT no needs 86y M pmh insulin dependent diabetes, prostate ca in remission, late stage alzheimer's presenting for elevated sugars x1 week.    # Hyperglycemia, no HHS or DKA   - A1C 12.1  - FS in am and pre-meals  - beta-hydroxy unremarkable, small ketones in urine  - serum osm 307, pH 7.37, cCo2 elevated 55  - lipase unremarkable, UA negative   - insulin long acting 15 qhs, 5 pre-meals tid, ss    #Constipation  - CTAP: large stool burden   - Will start Miralax/Lactulose    # Advanced Dementia   - c/w Aricept, daughter unsure of dose. Does not recall pharmacy  - will have to clarify dose   - pt has episodes of agitation. Give Haldol IM 1 mg prn   - Melatonin 5 at night     # h/o prostate ca s/p RT    Diet: DASH/carb consistent   DVT ppx: Lovenox   Dispo: d/c in 24 hours, PT no needs    Attending Attestation:    Pt has been seen and examined. Case and Plan discussed at rounds and agree with Resident Note as corrected.  Pt is admitted for Hyperglycemia / AMS in Advance Dementia and irregular Lantus dose changes.    CAPILLARY BLOOD GLUCOSE  POCT Blood Glucose.: 304 mg/dL (03 May 2021 16:27)  POCT Blood Glucose.: 388 mg/dL (03 May 2021 11:45)  POCT Blood Glucose.: 289 mg/dL (03 May 2021 07:53)  POCT Blood Glucose.: 182 mg/dL (02 May 2021 21:42)    MEDICATIONS  (STANDING):  dextrose 40% Gel 15 Gram(s) Oral once  dextrose 5%. 1000 milliLiter(s) (50 mL/Hr) IV Continuous <Continuous>  dextrose 5%. 1000 milliLiter(s) (100 mL/Hr) IV Continuous <Continuous>  dextrose 50% Injectable 25 Gram(s) IV Push once  dextrose 50% Injectable 12.5 Gram(s) IV Push once  dextrose 50% Injectable 25 Gram(s) IV Push once  enoxaparin Injectable 40 milliGRAM(s) SubCutaneous daily  glucagon  Injectable 1 milliGRAM(s) IntraMuscular once  insulin glargine Injectable (LANTUS) 15 Unit(s) SubCutaneous every morning  insulin lispro (ADMELOG) corrective regimen sliding scale   SubCutaneous three times a day before meals  insulin lispro Injectable (ADMELOG) 5 Unit(s) SubCutaneous three times a day before meals  melatonin 5 milliGRAM(s) Oral at bedtime  polyethylene glycol 3350 17 Gram(s) Oral daily    MEDICATIONS  (PRN):  haloperidol    Injectable 1 milliGRAM(s) IntraMuscular every 6 hours PRN Agitation    Plan:   Check HA1C, Get SW/PT/OT  Okay to start with Lantus 15Un SQ qhs.   No Sliding Scale for now just keep monitoring qAC/HS  c/w all other meds

## 2021-05-04 ENCOUNTER — TRANSCRIPTION ENCOUNTER (OUTPATIENT)
Age: 86
End: 2021-05-04

## 2021-05-04 VITALS
SYSTOLIC BLOOD PRESSURE: 130 MMHG | DIASTOLIC BLOOD PRESSURE: 76 MMHG | TEMPERATURE: 97 F | HEART RATE: 106 BPM | RESPIRATION RATE: 18 BRPM

## 2021-05-04 LAB
ANION GAP SERPL CALC-SCNC: 11 MMOL/L — SIGNIFICANT CHANGE UP (ref 7–14)
BASOPHILS # BLD AUTO: 0.08 K/UL — SIGNIFICANT CHANGE UP (ref 0–0.2)
BASOPHILS NFR BLD AUTO: 0.9 % — SIGNIFICANT CHANGE UP (ref 0–1)
BUN SERPL-MCNC: 16 MG/DL — SIGNIFICANT CHANGE UP (ref 10–20)
CALCIUM SERPL-MCNC: 9.1 MG/DL — SIGNIFICANT CHANGE UP (ref 8.5–10.1)
CHLORIDE SERPL-SCNC: 100 MMOL/L — SIGNIFICANT CHANGE UP (ref 98–110)
CO2 SERPL-SCNC: 25 MMOL/L — SIGNIFICANT CHANGE UP (ref 17–32)
CREAT SERPL-MCNC: 1 MG/DL — SIGNIFICANT CHANGE UP (ref 0.7–1.5)
EOSINOPHIL # BLD AUTO: 0.05 K/UL — SIGNIFICANT CHANGE UP (ref 0–0.7)
EOSINOPHIL NFR BLD AUTO: 0.6 % — SIGNIFICANT CHANGE UP (ref 0–8)
GLUCOSE BLDC GLUCOMTR-MCNC: 157 MG/DL — HIGH (ref 70–99)
GLUCOSE BLDC GLUCOMTR-MCNC: 222 MG/DL — HIGH (ref 70–99)
GLUCOSE BLDC GLUCOMTR-MCNC: 231 MG/DL — HIGH (ref 70–99)
GLUCOSE SERPL-MCNC: 188 MG/DL — HIGH (ref 70–99)
HCT VFR BLD CALC: 39.1 % — LOW (ref 42–52)
HGB BLD-MCNC: 12.9 G/DL — LOW (ref 14–18)
IMM GRANULOCYTES NFR BLD AUTO: 0.4 % — HIGH (ref 0.1–0.3)
LYMPHOCYTES # BLD AUTO: 1.68 K/UL — SIGNIFICANT CHANGE UP (ref 1.2–3.4)
LYMPHOCYTES # BLD AUTO: 19.9 % — LOW (ref 20.5–51.1)
MAGNESIUM SERPL-MCNC: 1.8 MG/DL — SIGNIFICANT CHANGE UP (ref 1.8–2.4)
MCHC RBC-ENTMCNC: 28.1 PG — SIGNIFICANT CHANGE UP (ref 27–31)
MCHC RBC-ENTMCNC: 33 G/DL — SIGNIFICANT CHANGE UP (ref 32–37)
MCV RBC AUTO: 85.2 FL — SIGNIFICANT CHANGE UP (ref 80–94)
MONOCYTES # BLD AUTO: 0.66 K/UL — HIGH (ref 0.1–0.6)
MONOCYTES NFR BLD AUTO: 7.8 % — SIGNIFICANT CHANGE UP (ref 1.7–9.3)
NEUTROPHILS # BLD AUTO: 5.96 K/UL — SIGNIFICANT CHANGE UP (ref 1.4–6.5)
NEUTROPHILS NFR BLD AUTO: 70.4 % — SIGNIFICANT CHANGE UP (ref 42.2–75.2)
NRBC # BLD: 0 /100 WBCS — SIGNIFICANT CHANGE UP (ref 0–0)
PLATELET # BLD AUTO: 330 K/UL — SIGNIFICANT CHANGE UP (ref 130–400)
POTASSIUM SERPL-MCNC: 4.4 MMOL/L — SIGNIFICANT CHANGE UP (ref 3.5–5)
POTASSIUM SERPL-SCNC: 4.4 MMOL/L — SIGNIFICANT CHANGE UP (ref 3.5–5)
RBC # BLD: 4.59 M/UL — LOW (ref 4.7–6.1)
RBC # FLD: 13 % — SIGNIFICANT CHANGE UP (ref 11.5–14.5)
SODIUM SERPL-SCNC: 136 MMOL/L — SIGNIFICANT CHANGE UP (ref 135–146)
WBC # BLD: 8.46 K/UL — SIGNIFICANT CHANGE UP (ref 4.8–10.8)
WBC # FLD AUTO: 8.46 K/UL — SIGNIFICANT CHANGE UP (ref 4.8–10.8)

## 2021-05-04 PROCEDURE — 99239 HOSP IP/OBS DSCHRG MGMT >30: CPT

## 2021-05-04 RX ORDER — ENOXAPARIN SODIUM 100 MG/ML
15 INJECTION SUBCUTANEOUS
Qty: 450 | Refills: 0
Start: 2021-05-04 | End: 2021-06-02

## 2021-05-04 RX ORDER — INSULIN LISPRO 100/ML
5 VIAL (ML) SUBCUTANEOUS
Qty: 15 | Refills: 0
Start: 2021-05-04 | End: 2021-06-02

## 2021-05-04 RX ORDER — LANOLIN ALCOHOL/MO/W.PET/CERES
1 CREAM (GRAM) TOPICAL
Qty: 0 | Refills: 0 | DISCHARGE
Start: 2021-05-04

## 2021-05-04 RX ORDER — INSULIN GLARGINE 100 [IU]/ML
45 INJECTION, SOLUTION SUBCUTANEOUS
Qty: 0 | Refills: 0 | DISCHARGE

## 2021-05-04 RX ORDER — POLYETHYLENE GLYCOL 3350 17 G/17G
17 POWDER, FOR SOLUTION ORAL
Qty: 119 | Refills: 0
Start: 2021-05-04 | End: 2021-05-10

## 2021-05-04 RX ADMIN — Medication 5 UNIT(S): at 11:50

## 2021-05-04 RX ADMIN — INSULIN GLARGINE 15 UNIT(S): 100 INJECTION, SOLUTION SUBCUTANEOUS at 07:59

## 2021-05-04 RX ADMIN — Medication 2: at 11:50

## 2021-05-04 RX ADMIN — Medication 5 UNIT(S): at 07:58

## 2021-05-04 RX ADMIN — Medication 2: at 07:58

## 2021-05-04 RX ADMIN — HALOPERIDOL DECANOATE 1 MILLIGRAM(S): 100 INJECTION INTRAMUSCULAR at 08:00

## 2021-05-04 RX ADMIN — POLYETHYLENE GLYCOL 3350 17 GRAM(S): 17 POWDER, FOR SOLUTION ORAL at 11:51

## 2021-05-04 RX ADMIN — ENOXAPARIN SODIUM 40 MILLIGRAM(S): 100 INJECTION SUBCUTANEOUS at 11:51

## 2021-05-04 NOTE — DISCHARGE NOTE PROVIDER - HOSPITAL COURSE
HPI:  86y M pmh insulin dependent diabetes, prostate ca in remission, late stage alzheimer's presenting for elevated sugars x1 week. Hx provided by daughter who says his sugars whenever he checks has been consistently over 500. Has been giving him long acting insulin in the morning 35-40 units. When she gave him higher than 40 last week, he had 2 episodes of hypoglycemia when his sugar was 30. +episodes of incontinence which is new/different for him. Increase in confusion. According to his daughter, the pt has not experienced any fevers, chills, denies n/v, sick contacts, any complaints.     In the ED, BG was >600. 10 U IV Insulin given, rpt . The pt was given IVF 2L bolus. While in ED, pt had agressive episode and kicked the PCA. He was given Haldol 1 mg x2.       Hospital Course  - patient admitted for hyperglycemia, labs unremarkable besides a Glc of 550, no ketones in blood, small amounts in urine. Insulin regimen modified. A1C found to be 12.1. CTAP sig for large stool burden, started on Miralax and Lactulose. Educated family on management of diabetes. Stable and safe for d/c HPI:  86y M pmh insulin dependent diabetes, prostate ca in remission, late stage alzheimer's presenting for elevated sugars x1 week. Hx provided by daughter who says his sugars whenever he checks has been consistently over 500. Has been giving him long acting insulin in the morning 35-40 units. When she gave him higher than 40 last week, he had 2 episodes of hypoglycemia when his sugar was 30. +episodes of incontinence which is new/different for him. Increase in confusion. According to his daughter, the pt has not experienced any fevers, chills, denies n/v, sick contacts, any complaints.     In the ED, BG was >600. 10 U IV Insulin given, rpt . The pt was given IVF 2L bolus. While in ED, pt had agressive episode and kicked the PCA. He was given Haldol 1 mg x2.       Hospital Course  - patient admitted for hyperglycemia, labs unremarkable besides a Glc of 550, no ketones in blood, small amounts in urine. Insulin regimen modified. A1C found to be 12.1. CTAP sig for large stool burden, started on Miralax and Lactulose. Educated family on management of diabetes. Stable and safe for d/c    Attending Attestation:  Pt seen and examined. Case and Plan discussed at rounds and agree with this d/c summary as corrected.   Pt has Uncontrolled DM2 HA1C above 12  Family has been managing with sliding scale Lantus (inappropriate use of Lantus)  Pt has been unpredictable in his eating behavior and sometimes Dementia with Behavior Disturbance. Pt has received a sedative to control his behavior during this hospitalization due to climbing on beds and trying to pull out his IVs for no reason. Pt was placed on 1:1 observation as he's a big fall risk.     Family adamant that they are able to take care of the pt at home. Decline Long Term Care Placement.   CM to re-establish all Home Services

## 2021-05-04 NOTE — DISCHARGE NOTE PROVIDER - NSDCCPCAREPLAN_GEN_ALL_CORE_FT
PRINCIPAL DISCHARGE DIAGNOSIS  Diagnosis: Uncontrolled diabetes mellitus  Assessment and Plan of Treatment: •Make sure you know the symptoms of hyperglycemia.  •Follow your diabetes management plan as told by your health care provider. Make sure you:  •Take your insulin and medicines as told.  •Follow your exercise plan.  •Follow your meal plan. Eat on time, and do not skip meals.  •Check your blood glucose as often as told. Make sure to check your blood glucose before and after exercise. If you exercise longer or in a different way than usual, check your blood glucose more often.  •Follow your sick day plan whenever you cannot eat or drink normally. Make this plan in advance with your health care provider.  •Share your diabetes management plan with people in your workplace, school, and household.  •Check your urine for ketones when you are ill and as told by your health care provider.  •Carry a medical alert card or wear medical alert jewelry.  Contact a health care provider if:  •Your blood glucose is at or above 240 mg/dL (13.3 mmol/L) for 2 days in a row.  •You have problems keeping your blood glucose in your target range.  •You have frequent episodes of hyperglycemia.  •You have signs of illness, such as nausea, vomiting, or fever.  Get help right away if:  •Your blood glucose monitor reads "high" even when you are taking insulin.  •You have trouble breathing.  •You have a change in how you think, feel, or act (mental status).  •You have nausea or vomiting that does not go away.  These symptoms may represent a serious problem that is an emergency. Do not wait to see if the symptoms will go away. Get medical help right away. Call your local emergency services (911 in the [place] Do not drive yourself to the hospital.

## 2021-05-04 NOTE — DISCHARGE NOTE PROVIDER - NSDCMRMEDTOKEN_GEN_ALL_CORE_FT
Admelog SoloStar 100 units/mL injectable solution: 5 unit(s) subcutaneous 3 times a day (with meals)   Aricept: orally once a day  glucometer (per patient&#x27;s insurance): Test blood sugars four times a day. Dispense #1 glucometer.  Haldol 0.5 mg oral tablet: 1 tab(s) orally once a day, As Needed  Insulin Pen Needles, 4mm: 1 application subcutaneously 4 times a day. ** Use with insulin pen **   lancets: 1 application subcutaneously 4 times a day   Lantus Solostar Pen 100 units/mL subcutaneous solution: 15 unit(s) subcutaneous once a day (at bedtime)   Melatonin 5 mg oral tablet: 1 tab(s) orally once a day (at bedtime)  polyethylene glycol 3350 oral powder for reconstitution: 17 gram(s) orally once a day  test strips (per patient&#x27;s insurance): 1 application subcutaneously 4 times a day. ** Compatible with patient&#x27;s glucometer **

## 2021-05-10 DIAGNOSIS — Z85.46 PERSONAL HISTORY OF MALIGNANT NEOPLASM OF PROSTATE: ICD-10-CM

## 2021-05-10 DIAGNOSIS — F02.81 DEMENTIA IN OTHER DISEASES CLASSIFIED ELSEWHERE, UNSPECIFIED SEVERITY, WITH BEHAVIORAL DISTURBANCE: ICD-10-CM

## 2021-05-10 DIAGNOSIS — K59.00 CONSTIPATION, UNSPECIFIED: ICD-10-CM

## 2021-05-10 DIAGNOSIS — E10.65 TYPE 1 DIABETES MELLITUS WITH HYPERGLYCEMIA: ICD-10-CM

## 2021-05-10 DIAGNOSIS — R73.9 HYPERGLYCEMIA, UNSPECIFIED: ICD-10-CM

## 2021-05-10 DIAGNOSIS — G30.9 ALZHEIMER'S DISEASE, UNSPECIFIED: ICD-10-CM

## 2021-10-12 ENCOUNTER — EMERGENCY (EMERGENCY)
Facility: HOSPITAL | Age: 86
LOS: 0 days | Discharge: HOME | End: 2021-10-13
Attending: EMERGENCY MEDICINE | Admitting: EMERGENCY MEDICINE
Payer: MEDICARE

## 2021-10-12 VITALS
WEIGHT: 156.09 LBS | HEART RATE: 83 BPM | OXYGEN SATURATION: 96 % | TEMPERATURE: 98 F | DIASTOLIC BLOOD PRESSURE: 58 MMHG | RESPIRATION RATE: 18 BRPM | HEIGHT: 61 IN | SYSTOLIC BLOOD PRESSURE: 125 MMHG
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
## 2021-10-12 DIAGNOSIS — R39.81 FUNCTIONAL URINARY INCONTINENCE: ICD-10-CM

## 2021-10-12 DIAGNOSIS — I45.10 UNSPECIFIED RIGHT BUNDLE-BRANCH BLOCK: ICD-10-CM
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
## 2021-10-12 DIAGNOSIS — Z79.4 LONG TERM (CURRENT) USE OF INSULIN: ICD-10-CM

## 2021-10-12 DIAGNOSIS — M79.672 PAIN IN LEFT FOOT: ICD-10-CM

## 2021-10-12 DIAGNOSIS — S80.212A ABRASION, LEFT KNEE, INITIAL ENCOUNTER: ICD-10-CM

## 2021-10-12 DIAGNOSIS — F03.90 UNSPECIFIED DEMENTIA WITHOUT BEHAVIORAL DISTURBANCE: ICD-10-CM

## 2021-10-12 DIAGNOSIS — Z85.46 PERSONAL HISTORY OF MALIGNANT NEOPLASM OF PROSTATE: ICD-10-CM

## 2021-10-12 DIAGNOSIS — Z20.822 CONTACT WITH AND (SUSPECTED) EXPOSURE TO COVID-19: ICD-10-CM

## 2021-10-12 DIAGNOSIS — E11.9 TYPE 2 DIABETES MELLITUS WITHOUT COMPLICATIONS: ICD-10-CM

## 2021-10-12 DIAGNOSIS — R41.82 ALTERED MENTAL STATUS, UNSPECIFIED: ICD-10-CM

## 2021-10-12 DIAGNOSIS — R45.1 RESTLESSNESS AND AGITATION: ICD-10-CM

## 2021-10-12 LAB
ALBUMIN SERPL ELPH-MCNC: 3.7 G/DL — SIGNIFICANT CHANGE UP (ref 3.5–5.2)
ALP SERPL-CCNC: 79 U/L — SIGNIFICANT CHANGE UP (ref 30–115)
ALT FLD-CCNC: 10 U/L — SIGNIFICANT CHANGE UP (ref 0–41)
ANION GAP SERPL CALC-SCNC: 12 MMOL/L — SIGNIFICANT CHANGE UP (ref 7–14)
APPEARANCE UR: CLEAR — SIGNIFICANT CHANGE UP
AST SERPL-CCNC: 10 U/L — SIGNIFICANT CHANGE UP (ref 0–41)
BASOPHILS # BLD AUTO: 0.07 K/UL — SIGNIFICANT CHANGE UP (ref 0–0.2)
BASOPHILS NFR BLD AUTO: 0.9 % — SIGNIFICANT CHANGE UP (ref 0–1)
BILIRUB SERPL-MCNC: 0.2 MG/DL — SIGNIFICANT CHANGE UP (ref 0.2–1.2)
BILIRUB UR-MCNC: NEGATIVE — SIGNIFICANT CHANGE UP
BUN SERPL-MCNC: 21 MG/DL — HIGH (ref 10–20)
CALCIUM SERPL-MCNC: 8.9 MG/DL — SIGNIFICANT CHANGE UP (ref 8.5–10.1)
CHLORIDE SERPL-SCNC: 101 MMOL/L — SIGNIFICANT CHANGE UP (ref 98–110)
CO2 SERPL-SCNC: 23 MMOL/L — SIGNIFICANT CHANGE UP (ref 17–32)
COLOR SPEC: YELLOW — SIGNIFICANT CHANGE UP
CREAT SERPL-MCNC: 0.8 MG/DL — SIGNIFICANT CHANGE UP (ref 0.7–1.5)
DIFF PNL FLD: NEGATIVE — SIGNIFICANT CHANGE UP
EOSINOPHIL # BLD AUTO: 0.22 K/UL — SIGNIFICANT CHANGE UP (ref 0–0.7)
EOSINOPHIL NFR BLD AUTO: 2.9 % — SIGNIFICANT CHANGE UP (ref 0–8)
GLUCOSE SERPL-MCNC: 285 MG/DL — HIGH (ref 70–99)
GLUCOSE UR QL: ABNORMAL
HCT VFR BLD CALC: 36.2 % — LOW (ref 42–52)
HGB BLD-MCNC: 11.7 G/DL — LOW (ref 14–18)
IMM GRANULOCYTES NFR BLD AUTO: 0.5 % — HIGH (ref 0.1–0.3)
KETONES UR-MCNC: NEGATIVE — SIGNIFICANT CHANGE UP
LACTATE SERPL-SCNC: 1.2 MMOL/L — SIGNIFICANT CHANGE UP (ref 0.7–2)
LEUKOCYTE ESTERASE UR-ACNC: NEGATIVE — SIGNIFICANT CHANGE UP
LYMPHOCYTES # BLD AUTO: 1.8 K/UL — SIGNIFICANT CHANGE UP (ref 1.2–3.4)
LYMPHOCYTES # BLD AUTO: 23.8 % — SIGNIFICANT CHANGE UP (ref 20.5–51.1)
MCHC RBC-ENTMCNC: 27.6 PG — SIGNIFICANT CHANGE UP (ref 27–31)
MCHC RBC-ENTMCNC: 32.3 G/DL — SIGNIFICANT CHANGE UP (ref 32–37)
MCV RBC AUTO: 85.4 FL — SIGNIFICANT CHANGE UP (ref 80–94)
MONOCYTES # BLD AUTO: 0.69 K/UL — HIGH (ref 0.1–0.6)
MONOCYTES NFR BLD AUTO: 9.1 % — SIGNIFICANT CHANGE UP (ref 1.7–9.3)
NEUTROPHILS # BLD AUTO: 4.73 K/UL — SIGNIFICANT CHANGE UP (ref 1.4–6.5)
NEUTROPHILS NFR BLD AUTO: 62.8 % — SIGNIFICANT CHANGE UP (ref 42.2–75.2)
NITRITE UR-MCNC: NEGATIVE — SIGNIFICANT CHANGE UP
NRBC # BLD: 0 /100 WBCS — SIGNIFICANT CHANGE UP (ref 0–0)
PH UR: 6 — SIGNIFICANT CHANGE UP (ref 5–8)
PLATELET # BLD AUTO: 316 K/UL — SIGNIFICANT CHANGE UP (ref 130–400)
POTASSIUM SERPL-MCNC: 5 MMOL/L — SIGNIFICANT CHANGE UP (ref 3.5–5)
POTASSIUM SERPL-SCNC: 5 MMOL/L — SIGNIFICANT CHANGE UP (ref 3.5–5)
PROT SERPL-MCNC: 6.7 G/DL — SIGNIFICANT CHANGE UP (ref 6–8)
PROT UR-MCNC: SIGNIFICANT CHANGE UP
RAPID RVP RESULT: SIGNIFICANT CHANGE UP
RBC # BLD: 4.24 M/UL — LOW (ref 4.7–6.1)
RBC # FLD: 13.1 % — SIGNIFICANT CHANGE UP (ref 11.5–14.5)
SARS-COV-2 RNA SPEC QL NAA+PROBE: SIGNIFICANT CHANGE UP
SODIUM SERPL-SCNC: 136 MMOL/L — SIGNIFICANT CHANGE UP (ref 135–146)
SP GR SPEC: 1.02 — SIGNIFICANT CHANGE UP (ref 1.01–1.03)
UROBILINOGEN FLD QL: SIGNIFICANT CHANGE UP
WBC # BLD: 7.55 K/UL — SIGNIFICANT CHANGE UP (ref 4.8–10.8)
WBC # FLD AUTO: 7.55 K/UL — SIGNIFICANT CHANGE UP (ref 4.8–10.8)

## 2021-10-12 PROCEDURE — 70450 CT HEAD/BRAIN W/O DYE: CPT | Mod: 26,MA

## 2021-10-12 PROCEDURE — 73564 X-RAY EXAM KNEE 4 OR MORE: CPT | Mod: 26,LT

## 2021-10-12 PROCEDURE — 93010 ELECTROCARDIOGRAM REPORT: CPT

## 2021-10-12 PROCEDURE — 99285 EMERGENCY DEPT VISIT HI MDM: CPT

## 2021-10-12 PROCEDURE — 71045 X-RAY EXAM CHEST 1 VIEW: CPT | Mod: 26

## 2021-10-12 PROCEDURE — 73630 X-RAY EXAM OF FOOT: CPT | Mod: 26,RT

## 2021-10-12 RX ORDER — HALOPERIDOL DECANOATE 100 MG/ML
5 INJECTION INTRAMUSCULAR ONCE
Refills: 0 | Status: COMPLETED | OUTPATIENT
Start: 2021-10-12 | End: 2021-10-12

## 2021-10-12 RX ADMIN — HALOPERIDOL DECANOATE 5 MILLIGRAM(S): 100 INJECTION INTRAMUSCULAR at 22:08

## 2021-10-12 NOTE — ED ADULT TRIAGE NOTE - CHIEF COMPLAINT QUOTE
Patient with hx of Dementia presents with AMS x 1 week . As per jorge luishtier there were no changes in symptoms in the last 24 hours. No facial droop  weakness or slurred speech noted on initial assessment

## 2021-10-12 NOTE — ED PROVIDER NOTE - PROGRESS NOTE DETAILS
luis fernando: pt became agitated when getting xray, and therefore given dose of Haldol in ER. (is on Haldol at home as well)

## 2021-10-12 NOTE — ED PROVIDER NOTE - CARE PROVIDER_API CALL
JESUS GUNN  Internal Medicine  [address]  [place] [address]  Phone: ([phone]  Fax: ()-  Follow Up Time: 1-3 Days

## 2021-10-12 NOTE — ED ADULT NURSE NOTE - OBJECTIVE STATEMENT
As per pt's daughter, "He doesn't recognize me and is speaking in Hebrew. He is confused this week."

## 2021-10-12 NOTE — ED PROVIDER NOTE - PATIENT PORTAL LINK FT
You can access the FollowMyHealth Patient Portal offered by [facility] by registering at the following website: [email or web address]. By joining [employer]’s FollowMyHealth portal, you will also be able to view your health information using other applications (apps) compatible with our system.

## 2021-10-12 NOTE — ED PROVIDER NOTE - CARE PLAN
report significant change in pain levels and/or motion post treatment. Progress as pt tolerates. Treatment/Activity Tolerance:   []Patient tolerated treatment well [] Patient limited by fatique  [x]Patient limited by pain [] Patient limited by other medical complications  [] Other:     Goals:          Long term goals  Time Frame for Long term goals : 4 weeks  Long term goal 1: Pt will be independent with HEP  Long term goal 2: Pt will report 50% improvement in frequency and intensity of pain  Long term goal 3: Pt will demonstrate WFL cervical and R shoulder active ROM  Long term goal 4: Pt will demonstrate improved posture 50% of the time without cueing in clinic  Long term goal 5: Pt will improve NDI by 7 points or more to indicate significant change.       Plan: [x] Continue per plan of care [] Alter current plan (see comments)   [] Plan of care initiated [] Hold pending MD visit [] Discharge      Plan for Next Session:      Re-Certification Due Date:         Signature:  Marina Arnett , PT Principal Discharge DX:	Dementia   1

## 2021-10-12 NOTE — ED PROVIDER NOTE - OBJECTIVE STATEMENT
86 year old M with hx of IDDM, prostate ca in remission, late stage alzheimer's, Maldivian speaking brought in by daughter for eval. As per daughter for the past 5 days pt has been more confused than usual. Pt is normally continent but has been having urinary incontinence and peeing on floor instead of cammode. Pt ambulates without assistance and has been more unsteady lately. No decreased po intake, vomiting, diarrhea, fever/cough, sob, bloody stools, known falls/trauma. unable to provide hx from pt.

## 2021-10-12 NOTE — ED PROVIDER NOTE - NSFOLLOWUPINSTRUCTIONS_ED_ALL_ED_FT
Dementia    WHAT YOU NEED TO KNOW:    Dementia is a condition that causes loss of memory, thought control, and judgment. Alzheimer disease is the most common cause of dementia. Other common causes are loss of blood flow or nerve damage in the brain, and long-term alcohol or drug use. Dementia cannot be cured or prevented, but treatment may slow or reduce your symptoms.     DISCHARGE INSTRUCTIONS:    Return to the emergency department if you or someone close to you notices:     You have signs of delirium, such as extreme confusion, and seeing or hearing things that are not there.      You become angry or violent, and cannot be calmed down.      You faint and cannot be woken.     Contact your healthcare provider if you or someone close to you notices:     You have a fever.      You have increased confusion, behavior, or mood changes.      You have questions or concerns about your condition or care.    Medicines: You may need any of the following:     Antianxiety medicine may be used to help reduce anxiety and keep you calm.      Antipsychotics may be used to help control any anger or violence.      Antidepressants may be used to help improve your mood and reduce your symptoms of depression.      Take your medicine as directed. Contact your healthcare provider if you think your medicine is not helping or if you have side effects. Tell him of her if you are allergic to any medicine. Keep a list of the medicines, vitamins, and herbs you take. Include the amounts, and when and why you take them. Bring the list or the pill bottles to follow-up visits. Carry your medicine list with you in case of an emergency.    Follow up with your healthcare provider as directed: Write down your questions so you remember to ask them during your visits. Ask someone to go in with you if you have trouble understanding or remembering what your healthcare provider tells you. The person can take notes for you during the visit and go over the notes with you later.     Manage your dementia: You may begin to need an in-home aide to help you remember your daily tasks. Ask your healthcare provider for a list of organizations that can help. It is best to arrange for help while you are thinking clearly. The following may also help you manage your dementia:     Keep your mind and body active. Do activities that you love, such as art, gardening, or listening to music. Call or visit people often. This will keep your social skills sharp, and may help reduce depression.      Take all of your medicines as directed. This will help control medical conditions, such as high blood pressure, high cholesterol, and diabetes.      Write daily schedules and routines. Record doctor appointments, times to take your medicines, meal times, or any other things to remember. Write down reminders to use the bathroom if you have trouble remembering. You may to ask someone to write things down for you.       Place clocks and calendars where you can see them. This will help you remember appointments and tasks.      Do not smoke. Nicotine and other chemicals in cigarettes and cigars can cause lung damage. Ask your healthcare provider for information if you currently smoke and need help to quit. E-cigarettes or smokeless tobacco still contain nicotine. Talk to your healthcare provider before you use these products.       Eat healthy foods. Examples are fruits, vegetables, whole-grain breads, low-fat dairy, beans, lean meats, and fish. Ask if you need to be on a special diet.         © Copyright [employer] 2019 All illustrations and images included in CareNotes are the copyrighted property of [employer] or [employer].    Please follow up with your primary care doctor within one week.

## 2021-10-12 NOTE — ED PROVIDER NOTE - CLINICAL SUMMARY MEDICAL DECISION MAKING FREE TEXT BOX
87 yo male (Senegalese and Swiss speaking) with PMH of DM, Alzheimer's, hematuria (resolved) presents to ER with daughter for change in behavior. Pt complained of some foot and knee pain while in the ER, otherwise no complaints. Daughter states pt seems more sleepy lately, is "lethargic",  more confused, and notes behaviour changes such as urinating on the floor (states usually he is able to use the commode). Pt seems less interactive, not really walking around like normally, sometimes unsteady. Denies any fever/n/v/d/abdomen pain/cp/sob/headache/dizziness/falls or other trauma. Denies cough or URI recently. No skin infections or new rashes. Is utd on PNA shot (got 4 days ago), and flu shot (got in Sept), however no covid shot. Denies any facial droop/drift/slurred speech. On exam pt is awake, alert, singing to me in Swiss (is NOT lethargic) and oriented to name only, in NAD, NCAT, Lungs CTAB, Heart regular S1S2 +murmur RSB grade 3/4, Abdomen soft nt/nd +BS, no mass, Ext no edema or calf tenderness, small superficial abrasion to left knee, no cellulitis, Neuro no facial droop., no motor weakness, follows command, no gross sensory deficits. Labs, UA, EKG, CT head and xrays ordered and results reviewed. Pt with elevation of glucose but no DKA, mild anemia but no elevated wbc, UA neg, all xrays neg, CT head neg for acute pathology. Daughter informed of results and although admission offered if family can no longer care for pt, daughter prefers pt to come home. States pt lives with her, and she would rather have him home in a familiar environment as he would become very agitated if in the hospital. Recommend she follow up with patient's PMD for outpatient evaluations. Daughter amenable to this. Return precautions given.

## 2021-10-12 NOTE — ED PROVIDER NOTE - PHYSICAL EXAMINATION
CONSTITUTIONAL: Well-appearing; well-nourished; in no apparent distress.   EYES: PERRL; EOM intact.   ENT: normal nose; no rhinorrhea; normal pharynx with no tonsillar hypertrophy.   NECK: Supple; non-tender; no cervical lymphadenopathy.   CARDIOVASCULAR: Normal S1, S2; no murmurs, rubs, or gallops.   RESPIRATORY: Normal chest excursion with respiration; breath sounds clear and equal bilaterally; no wheezes, rhonchi, or rales.  GI/: Normal bowel sounds; non-distended; non-tender; no palpable organomegaly.   MS: No evidence of trauma or deformity. Normal ROM in all four extremities; non-tender to palpation; distal pulses are normal.   SKIN: + small abrasion noted to L knee  NEURO/PSYCH: A & O x 1; grossly unremarkable. mood and manner are appropriate. pt following commands well and moving all extrem CONSTITUTIONAL: Well-appearing; well-nourished; in no apparent distress.   EYES: PERRL; EOM intact.   ENT: normal nose; no rhinorrhea; normal pharynx with no tonsillar hypertrophy.   NECK: Supple; non-tender; no cervical lymphadenopathy.   CARDIOVASCULAR: Normal S1, S2.   RESPIRATORY: Normal chest excursion with respiration; breath sounds clear and equal bilaterally; no wheezes, rhonchi, or rales.  GI/: Normal bowel sounds; non-distended; non-tender; no palpable organomegaly.   MS: No evidence of trauma or deformity. Normal ROM in all four extremities; non-tender to palpation; distal pulses are normal.   SKIN: + small abrasion noted to L knee  NEURO/PSYCH: A & O x 1; grossly unremarkable. mood and manner are appropriate. pt following commands well and moving all extrem

## 2021-10-12 NOTE — ED ADULT NURSE NOTE - NSIMPLEMENTINTERV_GEN_ALL_ED
Implemented All Fall Risk Interventions:  [place] to call system. Call bell, personal items and telephone within reach. Instruct patient to call for assistance. Room bathroom lighting operational. Non-slip footwear when patient is off stretcher. Physically safe environment: no spills, clutter or unnecessary equipment. Stretcher in lowest position, wheels locked, appropriate side rails in place. Provide visual cue, wrist band, yellow gown, etc. Monitor gait and stability. Monitor for mental status changes and reorient to person, place, and time. Review medications for side effects contributing to fall risk. Reinforce activity limits and safety measures with patient and family.

## 2021-10-16 RX ORDER — CIPROFLOXACIN LACTATE 400MG/40ML
1 VIAL (ML) INTRAVENOUS
Qty: 5 | Refills: 0
Start: 2021-10-16 | End: 2021-10-20

## 2022-01-01 NOTE — DISCHARGE NOTE PROVIDER - NSDCFUADDAPPT_GEN_ALL_CORE_FT
Chief Complaint   Patient presents with     Well Child     4 week old well child        Please follow up with an Endocrinologist outpatient in 1 week

## 2022-03-25 ENCOUNTER — INPATIENT (INPATIENT)
Facility: HOSPITAL | Age: 87
LOS: 3 days | Discharge: HOME | End: 2022-03-29
Attending: INTERNAL MEDICINE | Admitting: INTERNAL MEDICINE
Payer: MEDICARE

## 2022-03-25 VITALS
HEART RATE: 84 BPM | SYSTOLIC BLOOD PRESSURE: 123 MMHG | OXYGEN SATURATION: 96 % | DIASTOLIC BLOOD PRESSURE: 75 MMHG | RESPIRATION RATE: 20 BRPM | HEIGHT: 61 IN

## 2022-03-25 LAB
ALBUMIN SERPL ELPH-MCNC: 3.3 G/DL — LOW (ref 3.5–5.2)
ALP SERPL-CCNC: 147 U/L — HIGH (ref 30–115)
ALT FLD-CCNC: 15 U/L — SIGNIFICANT CHANGE UP (ref 0–41)
ANION GAP SERPL CALC-SCNC: 11 MMOL/L — SIGNIFICANT CHANGE UP (ref 7–14)
APTT BLD: 43.5 SEC — HIGH (ref 27–39.2)
AST SERPL-CCNC: 18 U/L — SIGNIFICANT CHANGE UP (ref 0–41)
BASE EXCESS BLDV CALC-SCNC: 3.8 MMOL/L — HIGH (ref -2–3)
BASOPHILS # BLD AUTO: 0.04 K/UL — SIGNIFICANT CHANGE UP (ref 0–0.2)
BASOPHILS NFR BLD AUTO: 0.4 % — SIGNIFICANT CHANGE UP (ref 0–1)
BILIRUB SERPL-MCNC: 0.5 MG/DL — SIGNIFICANT CHANGE UP (ref 0.2–1.2)
BUN SERPL-MCNC: 20 MG/DL — SIGNIFICANT CHANGE UP (ref 10–20)
CA-I SERPL-SCNC: 1.29 MMOL/L — SIGNIFICANT CHANGE UP (ref 1.15–1.33)
CALCIUM SERPL-MCNC: 9.9 MG/DL — SIGNIFICANT CHANGE UP (ref 8.5–10.1)
CHLORIDE SERPL-SCNC: 103 MMOL/L — SIGNIFICANT CHANGE UP (ref 98–110)
CO2 SERPL-SCNC: 28 MMOL/L — SIGNIFICANT CHANGE UP (ref 17–32)
CREAT SERPL-MCNC: 0.7 MG/DL — SIGNIFICANT CHANGE UP (ref 0.7–1.5)
EGFR: 89 ML/MIN/1.73M2 — SIGNIFICANT CHANGE UP
EOSINOPHIL # BLD AUTO: 0.01 K/UL — SIGNIFICANT CHANGE UP (ref 0–0.7)
EOSINOPHIL NFR BLD AUTO: 0.1 % — SIGNIFICANT CHANGE UP (ref 0–8)
GAS PNL BLDV: 136 MMOL/L — SIGNIFICANT CHANGE UP (ref 136–145)
GAS PNL BLDV: SIGNIFICANT CHANGE UP
GLUCOSE BLDC GLUCOMTR-MCNC: 231 MG/DL — HIGH (ref 70–99)
GLUCOSE BLDC GLUCOMTR-MCNC: 60 MG/DL — LOW (ref 70–99)
GLUCOSE SERPL-MCNC: 67 MG/DL — LOW (ref 70–99)
HCO3 BLDV-SCNC: 29 MMOL/L — SIGNIFICANT CHANGE UP (ref 22–29)
HCT VFR BLD CALC: 42.6 % — SIGNIFICANT CHANGE UP (ref 42–52)
HCT VFR BLDA CALC: 40 % — SIGNIFICANT CHANGE UP (ref 39–51)
HGB BLD CALC-MCNC: 13.4 G/DL — SIGNIFICANT CHANGE UP (ref 12.6–17.4)
HGB BLD-MCNC: 13.2 G/DL — LOW (ref 14–18)
IMM GRANULOCYTES NFR BLD AUTO: 0.4 % — HIGH (ref 0.1–0.3)
INR BLD: 1.16 RATIO — SIGNIFICANT CHANGE UP (ref 0.65–1.3)
LACTATE BLDV-MCNC: 2.8 MMOL/L — HIGH (ref 0.5–2)
LYMPHOCYTES # BLD AUTO: 0.91 K/UL — LOW (ref 1.2–3.4)
LYMPHOCYTES # BLD AUTO: 9.4 % — LOW (ref 20.5–51.1)
MCHC RBC-ENTMCNC: 26.3 PG — LOW (ref 27–31)
MCHC RBC-ENTMCNC: 31 G/DL — LOW (ref 32–37)
MCV RBC AUTO: 84.9 FL — SIGNIFICANT CHANGE UP (ref 80–94)
MONOCYTES # BLD AUTO: 0.39 K/UL — SIGNIFICANT CHANGE UP (ref 0.1–0.6)
MONOCYTES NFR BLD AUTO: 4 % — SIGNIFICANT CHANGE UP (ref 1.7–9.3)
NEUTROPHILS # BLD AUTO: 8.27 K/UL — HIGH (ref 1.4–6.5)
NEUTROPHILS NFR BLD AUTO: 85.7 % — HIGH (ref 42.2–75.2)
NRBC # BLD: 0 /100 WBCS — SIGNIFICANT CHANGE UP (ref 0–0)
PCO2 BLDV: 46 MMHG — SIGNIFICANT CHANGE UP (ref 42–55)
PH BLDV: 7.41 — SIGNIFICANT CHANGE UP (ref 7.32–7.43)
PLATELET # BLD AUTO: 395 K/UL — SIGNIFICANT CHANGE UP (ref 130–400)
PO2 BLDV: 35 MMHG — SIGNIFICANT CHANGE UP
POTASSIUM BLDV-SCNC: 3.8 MMOL/L — SIGNIFICANT CHANGE UP (ref 3.5–5.1)
POTASSIUM SERPL-MCNC: 4 MMOL/L — SIGNIFICANT CHANGE UP (ref 3.5–5)
POTASSIUM SERPL-SCNC: 4 MMOL/L — SIGNIFICANT CHANGE UP (ref 3.5–5)
PROT SERPL-MCNC: 7.5 G/DL — SIGNIFICANT CHANGE UP (ref 6–8)
PROTHROM AB SERPL-ACNC: 13.3 SEC — HIGH (ref 9.95–12.87)
RBC # BLD: 5.02 M/UL — SIGNIFICANT CHANGE UP (ref 4.7–6.1)
RBC # FLD: 15.9 % — HIGH (ref 11.5–14.5)
SAO2 % BLDV: 61.9 % — SIGNIFICANT CHANGE UP
SARS-COV-2 RNA SPEC QL NAA+PROBE: SIGNIFICANT CHANGE UP
SODIUM SERPL-SCNC: 142 MMOL/L — SIGNIFICANT CHANGE UP (ref 135–146)
WBC # BLD: 9.66 K/UL — SIGNIFICANT CHANGE UP (ref 4.8–10.8)
WBC # FLD AUTO: 9.66 K/UL — SIGNIFICANT CHANGE UP (ref 4.8–10.8)

## 2022-03-25 PROCEDURE — 72170 X-RAY EXAM OF PELVIS: CPT | Mod: 26,59

## 2022-03-25 PROCEDURE — 70450 CT HEAD/BRAIN W/O DYE: CPT | Mod: 26,MA

## 2022-03-25 PROCEDURE — 73552 X-RAY EXAM OF FEMUR 2/>: CPT | Mod: 26,RT

## 2022-03-25 PROCEDURE — 71045 X-RAY EXAM CHEST 1 VIEW: CPT | Mod: 26

## 2022-03-25 PROCEDURE — 99222 1ST HOSP IP/OBS MODERATE 55: CPT | Mod: AI

## 2022-03-25 PROCEDURE — 73502 X-RAY EXAM HIP UNI 2-3 VIEWS: CPT | Mod: 26,RT

## 2022-03-25 PROCEDURE — 93010 ELECTROCARDIOGRAM REPORT: CPT

## 2022-03-25 PROCEDURE — 99285 EMERGENCY DEPT VISIT HI MDM: CPT | Mod: GC

## 2022-03-25 RX ORDER — MORPHINE SULFATE 50 MG/1
4 CAPSULE, EXTENDED RELEASE ORAL ONCE
Refills: 0 | Status: DISCONTINUED | OUTPATIENT
Start: 2022-03-25 | End: 2022-03-25

## 2022-03-25 RX ORDER — HALOPERIDOL DECANOATE 100 MG/ML
2.5 INJECTION INTRAMUSCULAR ONCE
Refills: 0 | Status: COMPLETED | OUTPATIENT
Start: 2022-03-25 | End: 2022-03-25

## 2022-03-25 RX ORDER — DEXTROSE 50 % IN WATER 50 %
25 SYRINGE (ML) INTRAVENOUS ONCE
Refills: 0 | Status: COMPLETED | OUTPATIENT
Start: 2022-03-25 | End: 2022-03-25

## 2022-03-25 RX ADMIN — MORPHINE SULFATE 4 MILLIGRAM(S): 50 CAPSULE, EXTENDED RELEASE ORAL at 19:06

## 2022-03-25 RX ADMIN — HALOPERIDOL DECANOATE 2.5 MILLIGRAM(S): 100 INJECTION INTRAMUSCULAR at 19:06

## 2022-03-25 RX ADMIN — Medication 25 GRAM(S): at 21:02

## 2022-03-25 NOTE — H&P ADULT - NSHPPHYSICALEXAM_GEN_ALL_CORE
VITALS:   T(C): 35.7 (03-25-22 @ 21:23), Max: 36 (03-25-22 @ 16:43)  HR: 90 (03-25-22 @ 21:23) (80 - 90)  BP: 174/84 (03-25-22 @ 21:23) (123/75 - 174/84)  RR: 20 (03-25-22 @ 21:23) (18 - 20)  SpO2: 95% (03-25-22 @ 21:23) (95% - 96%)    GENERAL: NAD, lying in bed comfortably, with right hip in flexed position and legs crossed appearing to be in no pain  HEAD:  Atraumatic, Normocephalic  EYES: EOMI, conjunctiva and sclera clear  ENT: Moist mucous membranes  NECK: Supple, No JVD  CHEST/LUNG: CTA b/l, Unlabored respirations  HEART: Regular rate and rhythm; No murmurs, rubs, or gallops  ABDOMEN: Bowel sounds present; Soft, Nontender, Nondistended.   EXTREMITIES:   No clubbing, cyanosis, or edema  NERVOUS SYSTEM:  Alert & Oriented X3, speech clear. No deficits   MSK: pain illicit with passive ROM of right hip, pt unwilling to allow any further PE of right hip by pushing my hands away  SKIN: multiple old dry abrasions on knee

## 2022-03-25 NOTE — ED ADULT NURSE NOTE - NSIMPLEMENTINTERV_GEN_ALL_ED
Implemented All Fall with Harm Risk Interventions:  [place] to call system. Call bell, personal items and telephone within reach. Instruct patient to call for assistance. Room bathroom lighting operational. Non-slip footwear when patient is off stretcher. Physically safe environment: no spills, clutter or unnecessary equipment. Stretcher in lowest position, wheels locked, appropriate side rails in place. Provide visual cue, wrist band, yellow gown, etc. Monitor gait and stability. Monitor for mental status changes and reorient to person, place, and time. Review medications for side effects contributing to fall risk. Reinforce activity limits and safety measures with patient and family. Provide visual clues: red socks.

## 2022-03-25 NOTE — ED PROVIDER NOTE - PROGRESS NOTE DETAILS
jeramy- right hip fracture noted on xray. jeramy- patient signed out to dr. dong pending CT head and ortho eval AG: received signout from Dr Nicholson. Pt seen at bedside resting comfortably. Pending CTH, ortho. AG: per ortho admit to [facility], Dr Roberts will see tomorrow.

## 2022-03-25 NOTE — ED PROVIDER NOTE - OBJECTIVE STATEMENT
87 y male with PMH of IDDM, Dementia non-verbal at baseline 87 y male with PMH of IDDM, Dementia non-verbal at baseline BIB EMS fo AMS.  per EMS patient is usually combative at baseline was noted by daughter to be less combative and not acting himself FS in field was 42 patient received 1 amp D50 and returned to baseline.  daughter on phone reported that patients home aid may have given more insulin than normal (receives 35u Lantus daily but today received a total of 55u today)  daughter reports patient was noted to be hypoglycemic 3 times today.  in addition daughter reports patient has been unable to ambulate for the past 2 weeks and has been holding his right hip.  per daughter patient remains full code.

## 2022-03-25 NOTE — H&P ADULT - ASSESSMENT
87 y male with PMH of IDDM, Dementia admitted for right hip Fx and hypoglycemia    # Right hip Fx  - ortho consult  - morphine 2mg IV Q6H PRN   - bed rest  - am labs    # Hypoglycemia  - Insulin SS  - monitor FS    # Dementia   - c/w donepezil    Pt is full code as per daughter  87 y male with PMH of IDDM, Dementia admitted for right hip Fx and hypoglycemia    # Right hip Fx  - ortho consult  - NPO for possible sx  - IVF  - morphine 2mg IV Q6H PRN   - bed rest  - Heparin 5,000 units x1  - am labs    # Hypoglycemia  - endocrinology consult  - Insulin SS  - monitor FS  - sulfonylurea serum     # Dementia   - c/w donepezil    # Constipation  - senna    Pt is full code as per daughter  87 y male with PMH of IDDM, Dementia admitted for right hip Fx and hypoglycemia    # Right hip Fx  - ortho consult  - NPO for possible sx  - IVF  - morphine 2mg IV Q6H PRN   - bed rest  - Heparin 5,000 units x1, ortho to decide on dvt ppx afterwards.   - am labs    # Hypoglycemia - mild, resolved  - endocrinology consult  - Insulin SS  - monitor FS  - sulfonylurea serum     # Dementia   - c/w donepezil    # Constipation  - senna    Pt is full code as per daughter  87 y male with PMH of IDDM, Dementia admitted for right hip Fx and hypoglycemia    # Right hip Fx  - ortho consult  - NPO for possible sx  - IVF  - morphine 2mg IV Q6H PRN   - bed rest  - Heparin 5,000 units x1, ortho to decide on dvt ppx afterwards.   - am labs    # Hypoglycemia - mild, resolved  - endocrinology consult  -hold insulin as pt is NPO  - monitor FS  - sulfonylurea serum     # Dementia   - c/w donepezil    # Constipation  - senna    Pt is full code as per daughter

## 2022-03-25 NOTE — H&P ADULT - NS ATTEND AMEND GEN_ALL_CORE FT
Agree w/ above, edited as needed.     Pt is a Maldivian speaking,  services ([place]ian ID#[id number], Orlando torres) was used, unfortunately pt is incoherent and nonsensical. pt is uncooperative. Hx obtained from chart / daughter.

## 2022-03-25 NOTE — ED PROVIDER NOTE - PHYSICAL EXAMINATION
PHYSICAL EXAM: I have reviewed current vital signs.  GENERAL: frail appearing well developed.  HEAD:  Normocephalic, atraumatic.  EYES: EOMI, PERRL, conjunctiva and sclera clear.  ENT: MMM, no erythema/exudates.  NECK: Supple, no JVD.  no spinal stepoff patient does not show visible signs of pain with palpation.   CHEST/LUNG: Clear to auscultation bilaterally; no wheezes, rales, or rhonchi.  HEART: Regular rate and rhythm, normal S1 and S2; no murmurs, rubs, or gallops.  ABDOMEN: Soft, nontender, nondistended.  Rectal: nontender, no hemorrhoids or fissures, no masses, soft brown stool in vault without blood patient with good rectal tone.  EXTREMITIES:  2+ peripheral pulses; no clubbing, cyanosis, or edema.  patient in visible pain with manipulation of right hip.  PSYCH: Cooperative, appropriate, normal mood and affect.  NEUROLOGY: A&O x 3. Motor 5/5. Sensory intact. No focal neurological deficits. CN II - XII intact. (-) dysmetria, facial droop, pronator drift.  SKIN: Warm and dry.  chronic ecchymotic changes to b/l forearms PHYSICAL EXAM: I have reviewed current vital signs.  GENERAL: frail appearing well developed.  HEAD:  Normocephalic, atraumatic.  EYES: EOMI, PERRL, conjunctiva and sclera clear.  ENT: MMM, no erythema/exudates.  NECK: Supple, no JVD.  no spinal stepoff patient does not show visible signs of pain with palpation.   CHEST/LUNG: Clear to auscultation bilaterally; no wheezes, rales, or rhonchi.  HEART: Regular rate and rhythm, normal S1 and S2; no murmurs, rubs, or gallops.  ABDOMEN: Soft, nontender, nondistended.  Rectal: nontender, no hemorrhoids or fissures, no masses, soft brown stool in vault without blood patient with good rectal tone.  EXTREMITIES:  2+ peripheral pulses; no clubbing, cyanosis, or edema.  patient in visible pain with manipulation of right hip.  PSYCH: Cooperative, appropriate, normal mood and affect.  NEUROLOGY: awake, alert, nonfocal  SKIN: Warm and dry.  chronic ecchymotic changes to b/l forearms

## 2022-03-25 NOTE — ED PROVIDER NOTE - CLINICAL SUMMARY MEDICAL DECISION MAKING FREE TEXT BOX
87y male above PMh BIBEMS for eval, given lantus but did not eat, was less aggressive prompting glucose check, low, given D50 by EMS with return to baseline ms, of note pt not walking for the last 2 weeks, no falls reported, on exam vital signs appreciated, nontoxic, uncooperative, elderly, frail, with right hip held in flexion with pain on rotation, rest of PE as above, labs and studies reviewed and d/w ortho, will admit [place]

## 2022-03-25 NOTE — ED ADULT NURSE REASSESSMENT NOTE - NS ED NURSE REASSESS COMMENT FT1
Pt returned from CT, linens changed and patient repositioned.
Report received from Denisha ARORA. Pt received awake and alert at baseline. Pt pending CT scan.

## 2022-03-25 NOTE — H&P ADULT - HISTORY OF PRESENT ILLNESS
87 y male with PMH of IDDM, Dementia non-verbal at baseline BIB EMS fo AMS.  per EMS patient is usually combative at baseline was noted by daughter to be less combative and not acting himself FS in field was 42 patient received 1 amp D50 and returned to baseline.  daughter on phone reported that patients home aid may have given more insulin than normal (receives 35u Lantus daily but today received a total of 55u today)  daughter reports patient was noted to be hypoglycemic 3 times today.  in addition daughter reports patient has been unable to ambulate for the past 2 weeks and has been holding his right hip.  per daughter patient remains full code. 87 y male with PMH of IDDM, Dementia non-verbal at baseline BIB EMS fo AMS. per EMS patient is usually combative at baseline was noted by daughter to be less combative and not acting himself FS in field was 42 patient received 1 amp D50 and returned to baseline.  daughter on phone reported that patients home aid may have given more insulin than normal (receives 35u Lantus daily but today received a total of 55u today)  daughter reports patient was noted to be hypoglycemic 3 times today.  In addition daughter reports patient has been unable to ambulate for the past 2 weeks and has been holding his right hip. Pt is ambulatory by self at baseline. Pt would tell daughter that his right leg hurts. Daughter stated she examined the area and there was no sign of trauma. Daughter states pt fell a few days ago as well, but pain and decrease in ambulation was on going for 2 weeks.     per daughter patient remains full code.

## 2022-03-25 NOTE — ED ADULT TRIAGE NOTE - CHIEF COMPLAINT QUOTE
BIBA ems states "he has Alzheimer's and is less combative today so check BGL which was 44, # 20 gauge placed to the right wrist 25g dextrose given, patient now at baseline, , but does have fowl smelling urine maybe UTI".

## 2022-03-25 NOTE — ED ADULT NURSE NOTE - OBJECTIVE STATEMENT
as per report pt fell at home approx. 2 wks ago and has not ambulated since. Contractions noted to lower extremities with multiple abrasions to bilateral knees and lower extremities in multiple stages of healing.

## 2022-03-25 NOTE — H&P ADULT - NSHPLABSRESULTS_GEN_ALL_CORE
LABS:  cret                        13.2   9.66  )-----------( 395      ( 25 Mar 2022 16:34 )             42.6     03-25    142  |  103  |  20  ----------------------------<  67<L>  4.0   |  28  |  0.7    Ca    9.9      25 Mar 2022 16:34    TPro  7.5  /  Alb  3.3<L>  /  TBili  0.5  /  DBili  x   /  AST  18  /  ALT  15  /  AlkPhos  147<H>  03-25    PT/INR - ( 25 Mar 2022 16:34 )   PT: 13.30 sec;   INR: 1.16 ratio         PTT - ( 25 Mar 2022 16:34 )  PTT:43.5 sec      RADIOLOGY:    CT Head No Cont (03.25.22 @ 20:16)     IMPRESSION:    No evidence of acute intracranial pathologyon this limited, motion   degraded exam.      Xray Hip 2-3 Views, Right (03.25.22 @ 18:15)     IMPRESSION:  Displaced subcapital right femoral neck fracture noted with   mottled/layering appearance of the femoral neck, raising possibility of a   pathologic fracture. Degenerative changes of right hip seen. Moderate   rectal stool burden.      Xray Pelvis AP only (03.25.22 @ 17:26)     IMPRESSION:  Proximally displaced subcapital right femoral neck fracture; right   femoral neck appears mottled/lytic, raising possibility of a pathologic   fracture. Degenerative changes of bilateral hips noted. Symmetric   bilateral sacroiliac joints. Intact pubic symphysis. Moderate rectal   stool burden.

## 2022-03-26 LAB
A1C WITH ESTIMATED AVERAGE GLUCOSE RESULT: 9.4 % — HIGH (ref 4–5.6)
ALBUMIN SERPL ELPH-MCNC: 3.2 G/DL — LOW (ref 3.5–5.2)
ALP SERPL-CCNC: 146 U/L — HIGH (ref 30–115)
ALT FLD-CCNC: 15 U/L — SIGNIFICANT CHANGE UP (ref 0–41)
ANION GAP SERPL CALC-SCNC: 17 MMOL/L — HIGH (ref 7–14)
AST SERPL-CCNC: 14 U/L — SIGNIFICANT CHANGE UP (ref 0–41)
BILIRUB SERPL-MCNC: 0.5 MG/DL — SIGNIFICANT CHANGE UP (ref 0.2–1.2)
BUN SERPL-MCNC: 21 MG/DL — HIGH (ref 10–20)
CALCIUM SERPL-MCNC: 9.5 MG/DL — SIGNIFICANT CHANGE UP (ref 8.5–10.1)
CHLORIDE SERPL-SCNC: 101 MMOL/L — SIGNIFICANT CHANGE UP (ref 98–110)
CO2 SERPL-SCNC: 24 MMOL/L — SIGNIFICANT CHANGE UP (ref 17–32)
CREAT SERPL-MCNC: 0.9 MG/DL — SIGNIFICANT CHANGE UP (ref 0.7–1.5)
EGFR: 83 ML/MIN/1.73M2 — SIGNIFICANT CHANGE UP
ESTIMATED AVERAGE GLUCOSE: 223 MG/DL — HIGH (ref 68–114)
GLUCOSE BLDC GLUCOMTR-MCNC: 177 MG/DL — HIGH (ref 70–99)
GLUCOSE BLDC GLUCOMTR-MCNC: 221 MG/DL — HIGH (ref 70–99)
GLUCOSE BLDC GLUCOMTR-MCNC: 254 MG/DL — HIGH (ref 70–99)
GLUCOSE BLDC GLUCOMTR-MCNC: 269 MG/DL — HIGH (ref 70–99)
GLUCOSE BLDC GLUCOMTR-MCNC: 279 MG/DL — HIGH (ref 70–99)
GLUCOSE SERPL-MCNC: 245 MG/DL — HIGH (ref 70–99)
HCT VFR BLD CALC: 41 % — LOW (ref 42–52)
HGB BLD-MCNC: 12.8 G/DL — LOW (ref 14–18)
MCHC RBC-ENTMCNC: 26.3 PG — LOW (ref 27–31)
MCHC RBC-ENTMCNC: 31.2 G/DL — LOW (ref 32–37)
MCV RBC AUTO: 84.4 FL — SIGNIFICANT CHANGE UP (ref 80–94)
NRBC # BLD: 0 /100 WBCS — SIGNIFICANT CHANGE UP (ref 0–0)
PLATELET # BLD AUTO: 400 K/UL — SIGNIFICANT CHANGE UP (ref 130–400)
POTASSIUM SERPL-MCNC: 5.2 MMOL/L — HIGH (ref 3.5–5)
POTASSIUM SERPL-SCNC: 5.2 MMOL/L — HIGH (ref 3.5–5)
PROT SERPL-MCNC: 7.2 G/DL — SIGNIFICANT CHANGE UP (ref 6–8)
RBC # BLD: 4.86 M/UL — SIGNIFICANT CHANGE UP (ref 4.7–6.1)
RBC # FLD: 15.9 % — HIGH (ref 11.5–14.5)
SODIUM SERPL-SCNC: 142 MMOL/L — SIGNIFICANT CHANGE UP (ref 135–146)
WBC # BLD: 8.19 K/UL — SIGNIFICANT CHANGE UP (ref 4.8–10.8)
WBC # FLD AUTO: 8.19 K/UL — SIGNIFICANT CHANGE UP (ref 4.8–10.8)

## 2022-03-26 PROCEDURE — 99232 SBSQ HOSP IP/OBS MODERATE 35: CPT

## 2022-03-26 RX ORDER — DONEPEZIL HYDROCHLORIDE 10 MG/1
5 TABLET, FILM COATED ORAL AT BEDTIME
Refills: 0 | Status: DISCONTINUED | OUTPATIENT
Start: 2022-03-26 | End: 2022-03-29

## 2022-03-26 RX ORDER — INSULIN GLARGINE 100 [IU]/ML
10 INJECTION, SOLUTION SUBCUTANEOUS AT BEDTIME
Refills: 0 | Status: DISCONTINUED | OUTPATIENT
Start: 2022-03-26 | End: 2022-03-29

## 2022-03-26 RX ORDER — DONEPEZIL HYDROCHLORIDE 10 MG/1
1 TABLET, FILM COATED ORAL
Qty: 0 | Refills: 0 | DISCHARGE

## 2022-03-26 RX ORDER — LANOLIN ALCOHOL/MO/W.PET/CERES
3 CREAM (GRAM) TOPICAL AT BEDTIME
Refills: 0 | Status: DISCONTINUED | OUTPATIENT
Start: 2022-03-26 | End: 2022-03-29

## 2022-03-26 RX ORDER — INSULIN LISPRO 100/ML
VIAL (ML) SUBCUTANEOUS
Refills: 0 | Status: DISCONTINUED | OUTPATIENT
Start: 2022-03-26 | End: 2022-03-26

## 2022-03-26 RX ORDER — GLUCAGON INJECTION, SOLUTION 0.5 MG/.1ML
1 INJECTION, SOLUTION SUBCUTANEOUS ONCE
Refills: 0 | Status: DISCONTINUED | OUTPATIENT
Start: 2022-03-26 | End: 2022-03-29

## 2022-03-26 RX ORDER — DONEPEZIL HYDROCHLORIDE 10 MG/1
0 TABLET, FILM COATED ORAL
Qty: 0 | Refills: 0 | DISCHARGE

## 2022-03-26 RX ORDER — DEXTROSE 50 % IN WATER 50 %
15 SYRINGE (ML) INTRAVENOUS ONCE
Refills: 0 | Status: DISCONTINUED | OUTPATIENT
Start: 2022-03-26 | End: 2022-03-29

## 2022-03-26 RX ORDER — DEXTROSE 50 % IN WATER 50 %
25 SYRINGE (ML) INTRAVENOUS ONCE
Refills: 0 | Status: DISCONTINUED | OUTPATIENT
Start: 2022-03-26 | End: 2022-03-29

## 2022-03-26 RX ORDER — SENNA PLUS 8.6 MG/1
2 TABLET ORAL AT BEDTIME
Refills: 0 | Status: DISCONTINUED | OUTPATIENT
Start: 2022-03-26 | End: 2022-03-29

## 2022-03-26 RX ORDER — ACETAMINOPHEN 500 MG
650 TABLET ORAL EVERY 6 HOURS
Refills: 0 | Status: DISCONTINUED | OUTPATIENT
Start: 2022-03-26 | End: 2022-03-29

## 2022-03-26 RX ORDER — SODIUM CHLORIDE 9 MG/ML
1000 INJECTION, SOLUTION INTRAVENOUS
Refills: 0 | Status: DISCONTINUED | OUTPATIENT
Start: 2022-03-26 | End: 2022-03-29

## 2022-03-26 RX ORDER — HEPARIN SODIUM 5000 [USP'U]/ML
5000 INJECTION INTRAVENOUS; SUBCUTANEOUS ONCE
Refills: 0 | Status: COMPLETED | OUTPATIENT
Start: 2022-03-26 | End: 2022-03-26

## 2022-03-26 RX ORDER — MORPHINE SULFATE 50 MG/1
2 CAPSULE, EXTENDED RELEASE ORAL EVERY 6 HOURS
Refills: 0 | Status: DISCONTINUED | OUTPATIENT
Start: 2022-03-26 | End: 2022-03-29

## 2022-03-26 RX ORDER — DEXTROSE 50 % IN WATER 50 %
12.5 SYRINGE (ML) INTRAVENOUS ONCE
Refills: 0 | Status: DISCONTINUED | OUTPATIENT
Start: 2022-03-26 | End: 2022-03-29

## 2022-03-26 RX ORDER — ONDANSETRON 8 MG/1
4 TABLET, FILM COATED ORAL EVERY 8 HOURS
Refills: 0 | Status: DISCONTINUED | OUTPATIENT
Start: 2022-03-26 | End: 2022-03-29

## 2022-03-26 RX ORDER — INSULIN LISPRO 100/ML
3 VIAL (ML) SUBCUTANEOUS
Refills: 0 | Status: DISCONTINUED | OUTPATIENT
Start: 2022-03-26 | End: 2022-03-27

## 2022-03-26 RX ORDER — SODIUM CHLORIDE 9 MG/ML
1000 INJECTION INTRAMUSCULAR; INTRAVENOUS; SUBCUTANEOUS
Refills: 0 | Status: DISCONTINUED | OUTPATIENT
Start: 2022-03-26 | End: 2022-03-29

## 2022-03-26 RX ORDER — OLANZAPINE 15 MG/1
5 TABLET, FILM COATED ORAL ONCE
Refills: 0 | Status: DISCONTINUED | OUTPATIENT
Start: 2022-03-26 | End: 2022-03-29

## 2022-03-26 RX ADMIN — SENNA PLUS 2 TABLET(S): 8.6 TABLET ORAL at 21:01

## 2022-03-26 RX ADMIN — INSULIN GLARGINE 10 UNIT(S): 100 INJECTION, SOLUTION SUBCUTANEOUS at 21:05

## 2022-03-26 RX ADMIN — DONEPEZIL HYDROCHLORIDE 5 MILLIGRAM(S): 10 TABLET, FILM COATED ORAL at 21:02

## 2022-03-26 RX ADMIN — HEPARIN SODIUM 5000 UNIT(S): 5000 INJECTION INTRAVENOUS; SUBCUTANEOUS at 04:06

## 2022-03-26 NOTE — PATIENT PROFILE ADULT - FALL HARM RISK - HARM RISK INTERVENTIONS

## 2022-03-26 NOTE — PROGRESS NOTE ADULT - SUBJECTIVE AND OBJECTIVE BOX
GERMANIA PAGAN  87y  Male      Patient is a 87y old  Male who presents with a chief complaint of fall (26 Mar 2022 15:11)  Imaging showed right femoral neck fracture . history obtained from daughter  talked with family. patient may have a fall. previously patient was ambulatory. lives with daughter.   Patient is full code       INTERVAL HPI/OVERNIGHT EVENTS:  none     No Known Allergies          FAMILY HISTORY:      T(C): 35.9 (03-26-22 @ 14:03), Max: 36.7 (03-26-22 @ 04:54)  HR: 100 (03-26-22 @ 14:03) (84 - 100)  BP: 115/61 (03-26-22 @ 14:03) (115/61 - 185/60)  RR: 20 (03-26-22 @ 14:03) (18 - 20)  SpO2: 95% (03-25-22 @ 21:23) (95% - 95%)    PHYSICAL EXAM:  GENERAL: NAD,  HEAD:  Atraumatic, Normocephalic  EYES: EOMI, PERRLA, conjunctiva and sclera clear  NERVOUS SYSTEM:  Alert   CHEST/LUNG: Clear to percussion bilaterally; No rales, rhonchi, wheezing, or rubs  HEART: Regular rate and rhythm; No murmurs, rubs, or gallops  ABDOMEN: Soft, Nontender, Nondistended; Bowel sounds present  EXTREMITIES: lower extremity in flexed position    Consultant(s) Notes Reviewed:  [x ] YES  [ ] NO  Care Discussed with Consultants/Other Providers [ x] YES  [ ] NO    LABS:                        12.8   8.19  )-----------( 400      ( 26 Mar 2022 06:51 )             41.0     03-26    142  |  101  |  21<H>  ----------------------------<  245<H>  5.2<H>   |  24  |  0.9    Ca    9.5      26 Mar 2022 06:51    TPro  7.2  /  Alb  3.2<L>  /  TBili  0.5  /  DBili  x   /  AST  14  /  ALT  15  /  AlkPhos  146<H>  03-26    LIVER FUNCTIONS - ( 26 Mar 2022 06:51 )  Alb: 3.2 g/dL / Pro: 7.2 g/dL / ALK PHOS: 146 U/L / ALT: 15 U/L / AST: 14 U/L / GGT: x                   RADIOLOGY & ADDITIONAL TESTS:    Imaging Personally Reviewed:  [x] YES  [ ] NO    HEALTH ISSUES - PROBLEM Dx:          MEDS:  acetaminophen     Tablet .. 650 milliGRAM(s) Oral every 6 hours PRN  aluminum hydroxide/magnesium hydroxide/simethicone Suspension 30 milliLiter(s) Oral every 4 hours PRN  dextrose 5%. 1000 milliLiter(s) IV Continuous <Continuous>  dextrose 50% Injectable 25 Gram(s) IV Push once  dextrose 50% Injectable 12.5 Gram(s) IV Push once  dextrose 50% Injectable 25 Gram(s) IV Push once  dextrose Oral Gel 15 Gram(s) Oral once PRN  donepezil 5 milliGRAM(s) Oral at bedtime  glucagon  Injectable 1 milliGRAM(s) IntraMuscular once  glucagon  Injectable 1 milliGRAM(s) IntraMuscular once  melatonin 3 milliGRAM(s) Oral at bedtime PRN  morphine  - Injectable 2 milliGRAM(s) IV Push every 6 hours PRN  ondansetron Injectable 4 milliGRAM(s) IV Push every 8 hours PRN  senna 2 Tablet(s) Oral at bedtime  sodium chloride 0.9%. 1000 milliLiter(s) IV Continuous <Continuous>      Assessment and Plan:   87 y male with PMH of IDDM, Dementia admitted for right hip Fx and hypoglycemia    # Right hip Fx  - ortho consult, discussed with ortho today. no plan for intervention at this time.   - IVF  - morphine 2mg IV Q6H PRN   - bed rest  - Heparin 5,000 units x1, ortho to decide on dvt ppx afterwards.   - am labs    # Hypoglycemia - mild, resolved  - endocrinology consult  - monitor FS  - sulfonylurea serum   -started on lantus at night as per endo recommendations  -started on soft diet     # Dementia   - c/w donepezil    # Constipation  - senna          Progress Note Handoff  likely needs rehab

## 2022-03-26 NOTE — CONSULT NOTE ADULT - ASSESSMENT
Type 2 DM admitted with neuroglycopenia (resolved).  Currently NPO for possible surgery.  Recommend Lantus 10 units qHs plus sliding scale Admelog for hyperglycemia.  When eating can begin Admelog 3 units ac. Check 25-hydroxy vitamin D level.  (D/W Dr Hernandez)

## 2022-03-26 NOTE — GOALS OF CARE CONVERSATION - ADVANCED CARE PLANNING - CONVERSATION DETAILS
Discusse with daughter. She wants full code for patient. she is a nurse and understands the process.

## 2022-03-26 NOTE — PATIENT PROFILE ADULT - TOBACCO USE
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 David.  Plan   1. NPO after midnight  2. Take the following medications with sips of water on the day of procedure: Losartan, Metoprolol, Amlodipine and Keppra  3. Use E-Z sponge as directed  4. Drink a quart of extra  fluids the day before your surgery.  5. Medical clearance with Dr. Price  6. CBC and BMP on chart.  7. PT /PTT /INR, Urinalysis, Type and Screen sent to lab  8. EKG and Chest x-ray done 65 years old female present to PST prior toright parietal craniotomy and resection of tumor with stealth navigation and neuromonitoring with Dr. David.  Plan   1. NPO after midnight  2. Take the following medications with sips of water on the day of procedure: Losartan, Metoprolol, Amlodipine and Keppra  3. Use E-Z sponge as directed  4. Drink a quart of extra  fluids the day before your surgery.  5. Medical clearance with Dr. Price  6. CBC and BMP on chart.  7. PT /PTT /INR, Urinalysis, Type and Screen sent to lab  8. EKG and Chest x-ray done     CAPRINI SCORE [CLOT]    AGE RELATED RISK FACTORS                                                       MOBILITY RELATED FACTORS  [ ] Age 41-60 years                                            (1 Point)                  [ ] Bed rest                                                        (1 Point)  [x ] Age: 61-74 years                                           (2 Points)                 [ ] Plaster cast                                                   (2 Points)  [ ] Age= 75 years                                              (3 Points)                 [ ] Bed bound for more than 72 hours                 (2 Points)    DISEASE RELATED RISK FACTORS                                               GENDER SPECIFIC FACTORS  [x ] Edema in the lower extremities                       (1 Point)                  [ ] Pregnancy                                                     (1 Point)  [ ] Varicose veins                                               (1 Point)                  [ ] Post-partum < 6 weeks                                   (1 Point)             [ x] BMI > 25 Kg/m2                                            (1 Point)                  [ ] Hormonal therapy  or oral contraception          (1 Point)                 [ ] Sepsis (in the previous month)                        (1 Point)                  [ ] History of pregnancy complications                 (1 point)  [ ] Pneumonia or serious lung disease                                               [ ] Unexplained or recurrent                     (1 Point)           (in the previous month)                               (1 Point)  [ ] Abnormal pulmonary function test                     (1 Point)                 SURGERY RELATED RISK FACTORS  [ ] Acute myocardial infarction                              (1 Point)                 [ ]  Section                                             (1 Point)  [ ] Congestive heart failure (in the previous month)  (1 Point)               [ ] Minor surgery                                                  (1 Point)   [ ] Inflammatory bowel disease                             (1 Point)                 [ ] Arthroscopic surgery                                        (2 Points)  [ ] Central venous access                                      (2 Points)                [x ] General surgery lasting more than 45 minutes   (2 Points)       [ ] Stroke (in the previous month)                          (5 Points)               [ ] Elective arthroplasty                                         (5 Points)                                                                                                                                               HEMATOLOGY RELATED FACTORS                                                 TRAUMA RELATED RISK FACTORS  [ ] Prior episodes of VTE                                     (3 Points)                 [ ] Fracture of the hip, pelvis, or leg                       (5 Points)  [ ] Positive family history for VTE                         (3 Points)                 [ ] Acute spinal cord injury (in the previous month)  (5 Points)  [ ] Prothrombin 77772 A                                     (3 Points)                 [ ] Paralysis  (less than 1 month)                             (5 Points)  [ ] Factor V Leiden                                             (3 Points)                  [ ] Multiple Trauma within 1 month                        (5 Points)  [ ] Lupus anticoagulants                                     (3 Points)                                                           [ ] Anticardiolipin antibodies                               (3 Points)                                                       [ ] High homocysteine in the blood                      (3 Points)                                             [ ] Other congenital or acquired thrombophilia      (3 Points)                                                [ ] Heparin induced thrombocytopenia                  (3 Points)                                          Total Score [         6 ]

## 2022-03-27 LAB
GLUCOSE BLDC GLUCOMTR-MCNC: 184 MG/DL — HIGH (ref 70–99)
GLUCOSE BLDC GLUCOMTR-MCNC: 184 MG/DL — HIGH (ref 70–99)
GLUCOSE BLDC GLUCOMTR-MCNC: 232 MG/DL — HIGH (ref 70–99)
GLUCOSE BLDC GLUCOMTR-MCNC: 75 MG/DL — SIGNIFICANT CHANGE UP (ref 70–99)

## 2022-03-27 PROCEDURE — 99232 SBSQ HOSP IP/OBS MODERATE 35: CPT

## 2022-03-27 RX ADMIN — INSULIN GLARGINE 10 UNIT(S): 100 INJECTION, SOLUTION SUBCUTANEOUS at 22:43

## 2022-03-27 RX ADMIN — Medication 3 UNIT(S): at 09:26

## 2022-03-27 NOTE — PROGRESS NOTE ADULT - SUBJECTIVE AND OBJECTIVE BOX
GERMANIA PAGAN  87y  Male      Patient is a 87y old  Male who presents with a chief complaint of fall (26 Mar 2022 15:11)  Imaging showed right femoral neck fracture . history obtained from daughter  talked with family. patient may have a fall. previously patient was ambulatory. lives with daughter.   Patient is full code       INTERVAL HPI/OVERNIGHT EVENTS:  none . still pending final ortho recommendation. afebrile     No Known Allergies          FAMILY HISTORY:    LABS:                        12.8   8.19  )-----------( 400      ( 26 Mar 2022 06:51 )             41.0     03-26    142  |  101  |  21<H>  ----------------------------<  245<H>  5.2<H>   |  24  |  0.9    Ca    9.5      26 Mar 2022 06:51    TPro  7.2  /  Alb  3.2<L>  /  TBili  0.5  /  DBili  x   /  AST  14  /  ALT  15  /  AlkPhos  146<H>  03-26    PT/INR - ( 25 Mar 2022 16:34 )   PT: 13.30 sec;   INR: 1.16 ratio         PTT - ( 25 Mar 2022 16:34 )  PTT:43.5 sec        PHYSICAL EXAM:  GENERAL: NAD,  HEAD:  Atraumatic, Normocephalic  EYES: EOMI, PERRLA, conjunctiva and sclera clear  NERVOUS SYSTEM:  Alert   CHEST/LUNG: Clear to percussion bilaterally; No rales, rhonchi, wheezing, or rubs  HEART: Regular rate and rhythm; No murmurs, rubs, or gallops  ABDOMEN: Soft, Nontender, Nondistended; Bowel sounds present  EXTREMITIES: lower extremity in flexed position    Consultant(s) Notes Reviewed:  [x ] YES  [ ] NO  Care Discussed with Consultants/Other Providers [ x] YES  [ ] NO    LABS:                     LIVER FUNCTIONS - ( 26 Mar 2022 06:51 )  Alb: 3.2 g/dL / Pro: 7.2 g/dL / ALK PHOS: 146 U/L / ALT: 15 U/L / AST: 14 U/L / GGT: x                   RADIOLOGY & ADDITIONAL TESTS:    Imaging Personally Reviewed:  [x] YES  [ ] NO    HEALTH ISSUES - PROBLEM Dx:          MEDS:  acetaminophen     Tablet .. 650 milliGRAM(s) Oral every 6 hours PRN  aluminum hydroxide/magnesium hydroxide/simethicone Suspension 30 milliLiter(s) Oral every 4 hours PRN  dextrose 5%. 1000 milliLiter(s) IV Continuous <Continuous>  dextrose 50% Injectable 25 Gram(s) IV Push once  dextrose 50% Injectable 12.5 Gram(s) IV Push once  dextrose 50% Injectable 25 Gram(s) IV Push once  dextrose Oral Gel 15 Gram(s) Oral once PRN  donepezil 5 milliGRAM(s) Oral at bedtime  glucagon  Injectable 1 milliGRAM(s) IntraMuscular once  glucagon  Injectable 1 milliGRAM(s) IntraMuscular once  melatonin 3 milliGRAM(s) Oral at bedtime PRN  morphine  - Injectable 2 milliGRAM(s) IV Push every 6 hours PRN  ondansetron Injectable 4 milliGRAM(s) IV Push every 8 hours PRN  senna 2 Tablet(s) Oral at bedtime  sodium chloride 0.9%. 1000 milliLiter(s) IV Continuous <Continuous>      Assessment and Plan:   87 y male with PMH of IDDM, Dementia admitted for right hip Fx and hypoglycemia    # Right hip Fx  - ortho consult, discussed with ortho. no plan for intervention at this time. waiting for final recommendations.   - IVF  - morphine 2mg IV Q6H PRN   - bed rest  - Heparin 5,000 units x1, ortho to decide on dvt ppx afterwards.   - am labs    # Hypoglycemia - mild, resolved  - endocrinology consult  - monitor FS  - sulfonylurea serum   -started on lantus at night as per endo recommendations  -started on soft diet     # Dementia   - c/w donepezil    # Constipation  - senna          Progress Note Handoff  likely needs rehab  pending orth recommendations    GERMANIA PAGAN  87y  Male      Patient is a 87y old  Male who presents with a chief complaint of fall (26 Mar 2022 15:11)  Imaging showed right femoral neck fracture . history obtained from daughter  talked with family. patient may have a fall. previously patient was ambulatory. lives with daughter.   Patient is full code   as per nursing staff, patient has difficulty swallowing food. will consult . speech.       INTERVAL HPI/OVERNIGHT EVENTS:  none . still pending final ortho recommendation. afebrile     No Known Allergies          FAMILY HISTORY:    LABS:                        12.8   8.19  )-----------( 400      ( 26 Mar 2022 06:51 )             41.0     03-26    142  |  101  |  21<H>  ----------------------------<  245<H>  5.2<H>   |  24  |  0.9    Ca    9.5      26 Mar 2022 06:51    TPro  7.2  /  Alb  3.2<L>  /  TBili  0.5  /  DBili  x   /  AST  14  /  ALT  15  /  AlkPhos  146<H>  03-26    PT/INR - ( 25 Mar 2022 16:34 )   PT: 13.30 sec;   INR: 1.16 ratio         PTT - ( 25 Mar 2022 16:34 )  PTT:43.5 sec        PHYSICAL EXAM:  GENERAL: NAD,  HEAD:  Atraumatic, Normocephalic  EYES: EOMI, PERRLA, conjunctiva and sclera clear  NERVOUS SYSTEM:  Alert   CHEST/LUNG: Clear to percussion bilaterally; No rales, rhonchi, wheezing, or rubs  HEART: Regular rate and rhythm; No murmurs, rubs, or gallops  ABDOMEN: Soft, Nontender, Nondistended; Bowel sounds present  EXTREMITIES: lower extremity in flexed position    Consultant(s) Notes Reviewed:  [x ] YES  [ ] NO  Care Discussed with Consultants/Other Providers [ x] YES  [ ] NO    LABS:                     LIVER FUNCTIONS - ( 26 Mar 2022 06:51 )  Alb: 3.2 g/dL / Pro: 7.2 g/dL / ALK PHOS: 146 U/L / ALT: 15 U/L / AST: 14 U/L / GGT: x                   RADIOLOGY & ADDITIONAL TESTS:    Imaging Personally Reviewed:  [x] YES  [ ] NO    HEALTH ISSUES - PROBLEM Dx:          MEDS:  acetaminophen     Tablet .. 650 milliGRAM(s) Oral every 6 hours PRN  aluminum hydroxide/magnesium hydroxide/simethicone Suspension 30 milliLiter(s) Oral every 4 hours PRN  dextrose 5%. 1000 milliLiter(s) IV Continuous <Continuous>  dextrose 50% Injectable 25 Gram(s) IV Push once  dextrose 50% Injectable 12.5 Gram(s) IV Push once  dextrose 50% Injectable 25 Gram(s) IV Push once  dextrose Oral Gel 15 Gram(s) Oral once PRN  donepezil 5 milliGRAM(s) Oral at bedtime  glucagon  Injectable 1 milliGRAM(s) IntraMuscular once  glucagon  Injectable 1 milliGRAM(s) IntraMuscular once  melatonin 3 milliGRAM(s) Oral at bedtime PRN  morphine  - Injectable 2 milliGRAM(s) IV Push every 6 hours PRN  ondansetron Injectable 4 milliGRAM(s) IV Push every 8 hours PRN  senna 2 Tablet(s) Oral at bedtime  sodium chloride 0.9%. 1000 milliLiter(s) IV Continuous <Continuous>      Assessment and Plan:   87 y male with PMH of IDDM, Dementia admitted for right hip Fx and hypoglycemia    # Right hip Fx  - ortho consult, discussed with ortho. no plan for intervention at this time. waiting for final recommendations.   - IVF  - morphine 2mg IV Q6H PRN   - bed rest  - Heparin 5,000 units x1, ortho to decide on dvt ppx afterwards.   - am labs    # Hypoglycemia - mild, resolved  - endocrinology consult  - monitor FS  - sulfonylurea serum   -started on lantus at night as per endo recommendations  -started on soft diet     # Dementia   - c/w donepezil    # Constipation  - senna          Progress Note Handoff  likely needs rehab  pending orth recommendations    GERMANIA PAGAN  87y  Male      Patient is a 87y old  Male who presents with a chief complaint of fall (26 Mar 2022 15:11)  Imaging showed right femoral neck fracture . history obtained from daughter  talked with family. patient may have a fall. previously patient was ambulatory. lives with daughter.   Patient is full code   as per nursing staff, patient has difficulty swallowing food. will consult . speech.       INTERVAL HPI/OVERNIGHT EVENTS:  none . still pending final ortho recommendation. afebrile     No Known Allergies          FAMILY HISTORY:    LABS:                        12.8   8.19  )-----------( 400      ( 26 Mar 2022 06:51 )             41.0     03-26    142  |  101  |  21<H>  ----------------------------<  245<H>  5.2<H>   |  24  |  0.9    Ca    9.5      26 Mar 2022 06:51    TPro  7.2  /  Alb  3.2<L>  /  TBili  0.5  /  DBili  x   /  AST  14  /  ALT  15  /  AlkPhos  146<H>  03-26    PT/INR - ( 25 Mar 2022 16:34 )   PT: 13.30 sec;   INR: 1.16 ratio         PTT - ( 25 Mar 2022 16:34 )  PTT:43.5 sec        PHYSICAL EXAM:  GENERAL: NAD,  HEAD:  Atraumatic, Normocephalic  EYES: EOMI, PERRLA, conjunctiva and sclera clear  NERVOUS SYSTEM:  Alert   CHEST/LUNG: Clear to percussion bilaterally; No rales, rhonchi, wheezing, or rubs  HEART: Regular rate and rhythm; No murmurs, rubs, or gallops  ABDOMEN: Soft, Nontender, Nondistended; Bowel sounds present  EXTREMITIES: lower extremity in flexed position    Consultant(s) Notes Reviewed:  [x ] YES  [ ] NO  Care Discussed with Consultants/Other Providers [ x] YES  [ ] NO    LABS:                     LIVER FUNCTIONS - ( 26 Mar 2022 06:51 )  Alb: 3.2 g/dL / Pro: 7.2 g/dL / ALK PHOS: 146 U/L / ALT: 15 U/L / AST: 14 U/L / GGT: x                   RADIOLOGY & ADDITIONAL TESTS:    Imaging Personally Reviewed:  [x] YES  [ ] NO    HEALTH ISSUES - PROBLEM Dx:          MEDS:  acetaminophen     Tablet .. 650 milliGRAM(s) Oral every 6 hours PRN  aluminum hydroxide/magnesium hydroxide/simethicone Suspension 30 milliLiter(s) Oral every 4 hours PRN  dextrose 5%. 1000 milliLiter(s) IV Continuous <Continuous>  dextrose 50% Injectable 25 Gram(s) IV Push once  dextrose 50% Injectable 12.5 Gram(s) IV Push once  dextrose 50% Injectable 25 Gram(s) IV Push once  dextrose Oral Gel 15 Gram(s) Oral once PRN  donepezil 5 milliGRAM(s) Oral at bedtime  glucagon  Injectable 1 milliGRAM(s) IntraMuscular once  glucagon  Injectable 1 milliGRAM(s) IntraMuscular once  melatonin 3 milliGRAM(s) Oral at bedtime PRN  morphine  - Injectable 2 milliGRAM(s) IV Push every 6 hours PRN  ondansetron Injectable 4 milliGRAM(s) IV Push every 8 hours PRN  senna 2 Tablet(s) Oral at bedtime  sodium chloride 0.9%. 1000 milliLiter(s) IV Continuous <Continuous>      Assessment and Plan:   87 y male with PMH of IDDM, Dementia admitted for right hip Fx and hypoglycemia    # Displaced subcapital right femoral neck fracture  - ortho consult, discussed with ortho. no plan for intervention at this time. waiting for final recommendations.   - IVF  - morphine 2mg IV Q6H PRN   - bed rest  - Heparin 5,000 units x1, ortho to decide on dvt ppx afterwards.   - am labs    # Hypoglycemia - mild, resolved  - endocrinology consult  - monitor FS  - sulfonylurea serum   -started on lantus at night as per endo recommendations  -started on soft diet     # Dementia   - c/w donepezil    # Constipation  - senna          Progress Note Handoff  likely needs rehab  pending orth recommendations

## 2022-03-28 LAB
GLUCOSE BLDC GLUCOMTR-MCNC: 180 MG/DL — HIGH (ref 70–99)
GLUCOSE BLDC GLUCOMTR-MCNC: 216 MG/DL — HIGH (ref 70–99)
GLUCOSE BLDC GLUCOMTR-MCNC: 217 MG/DL — HIGH (ref 70–99)
GLUCOSE BLDC GLUCOMTR-MCNC: 285 MG/DL — HIGH (ref 70–99)

## 2022-03-28 PROCEDURE — 99232 SBSQ HOSP IP/OBS MODERATE 35: CPT

## 2022-03-28 RX ADMIN — DONEPEZIL HYDROCHLORIDE 5 MILLIGRAM(S): 10 TABLET, FILM COATED ORAL at 21:57

## 2022-03-28 RX ADMIN — INSULIN GLARGINE 10 UNIT(S): 100 INJECTION, SOLUTION SUBCUTANEOUS at 21:57

## 2022-03-28 RX ADMIN — SODIUM CHLORIDE 70 MILLILITER(S): 9 INJECTION INTRAMUSCULAR; INTRAVENOUS; SUBCUTANEOUS at 02:48

## 2022-03-28 RX ADMIN — SENNA PLUS 2 TABLET(S): 8.6 TABLET ORAL at 21:57

## 2022-03-28 RX ADMIN — SODIUM CHLORIDE 70 MILLILITER(S): 9 INJECTION INTRAMUSCULAR; INTRAVENOUS; SUBCUTANEOUS at 18:11

## 2022-03-28 NOTE — PROGRESS NOTE ADULT - SUBJECTIVE AND OBJECTIVE BOX
GERMANIA PAGAN  87y  Male      Patient is a 87y old  Male who presents with a chief complaint of fall (26 Mar 2022 15:11)  Imaging showed right femoral neck fracture . history obtained from daughter  talked with family. patient may have a fall. previously patient was ambulatory. lives with daughter.   Patient is full code     as per nursing staff, patient has difficulty swallowing food. Speech consulted.   discussed with speech as well. Patient is difficulty to follow commands. Unable to assess as if this is truly as swallowing issue  also discussed with daughter who wants to take her father home. she told me that her father has not been eating well since he has difficulty in ambulation.  she wants peg tube. plan is to start on tube feeding. daughter understands that patient can pull up the tube feeding as he is difficult to follow commands.   will consult gi if family still wants peg tube and patient has persistent nutrition issue.       INTERVAL HPI/OVERNIGHT EVENTS:  none . still pending final ortho recommendation. afebrile     No Known Allergies          FAMILY HISTORY:    LABS:                        12.8   8.19  )-----------( 400      ( 26 Mar 2022 06:51 )             41.0     03-26    142  |  101  |  21<H>  ----------------------------<  245<H>  5.2<H>   |  24  |  0.9    Ca    9.5      26 Mar 2022 06:51    TPro  7.2  /  Alb  3.2<L>  /  TBili  0.5  /  DBili  x   /  AST  14  /  ALT  15  /  AlkPhos  146<H>  03-26    PT/INR - ( 25 Mar 2022 16:34 )   PT: 13.30 sec;   INR: 1.16 ratio         PTT - ( 25 Mar 2022 16:34 )  PTT:43.5 sec        PHYSICAL EXAM:  GENERAL: NAD,  HEAD:  Atraumatic, Normocephalic  EYES: EOMI, PERRLA, conjunctiva and sclera clear  NERVOUS SYSTEM:  Alert   CHEST/LUNG: Clear to percussion bilaterally; No rales, rhonchi, wheezing, or rubs  HEART: Regular rate and rhythm; No murmurs, rubs, or gallops  ABDOMEN: Soft, Nontender, Nondistended; Bowel sounds present  EXTREMITIES: lower extremity in flexed position    Consultant(s) Notes Reviewed:  [x ] YES  [ ] NO  Care Discussed with Consultants/Other Providers [ x] YES  [ ] NO    LABS:                     LIVER FUNCTIONS - ( 26 Mar 2022 06:51 )  Alb: 3.2 g/dL / Pro: 7.2 g/dL / ALK PHOS: 146 U/L / ALT: 15 U/L / AST: 14 U/L / GGT: x                   RADIOLOGY & ADDITIONAL TESTS:    Imaging Personally Reviewed:  [x] YES  [ ] NO    HEALTH ISSUES - PROBLEM Dx:          MEDS:  acetaminophen     Tablet .. 650 milliGRAM(s) Oral every 6 hours PRN  aluminum hydroxide/magnesium hydroxide/simethicone Suspension 30 milliLiter(s) Oral every 4 hours PRN  dextrose 5%. 1000 milliLiter(s) IV Continuous <Continuous>  dextrose 50% Injectable 25 Gram(s) IV Push once  dextrose 50% Injectable 12.5 Gram(s) IV Push once  dextrose 50% Injectable 25 Gram(s) IV Push once  dextrose Oral Gel 15 Gram(s) Oral once PRN  donepezil 5 milliGRAM(s) Oral at bedtime  glucagon  Injectable 1 milliGRAM(s) IntraMuscular once  glucagon  Injectable 1 milliGRAM(s) IntraMuscular once  melatonin 3 milliGRAM(s) Oral at bedtime PRN  morphine  - Injectable 2 milliGRAM(s) IV Push every 6 hours PRN  ondansetron Injectable 4 milliGRAM(s) IV Push every 8 hours PRN  senna 2 Tablet(s) Oral at bedtime  sodium chloride 0.9%. 1000 milliLiter(s) IV Continuous <Continuous>      Assessment and Plan:   87 y male with PMH of IDDM, Dementia admitted for right hip Fx and hypoglycemia    # Displaced subcapital right femoral neck fracture  - ortho consult, discussed with ortho. no plan for intervention at this time. waiting for final recommendations.   - IVF  - morphine 2mg IV Q6H PRN   - bed rest  - Heparin 5,000 units x1, ortho to decide on dvt ppx afterwards.   - am labs    # Hypoglycemia - mild, resolved  - endocrinology consult  - monitor FS  - sulfonylurea serum   -started on lantus at night as per endo recommendations  -started on soft diet     #nutrition  consulted dietician  family wants peg tube,  discussed with speech who unable to assess whether patient truly has swallowing issues as patient unable to follow command properly    # Dementia   - c/w donepezil    # Constipation  - senna          Discharge planning:   Consults: Ortho, dietician  Discharge barriers: pending final ortho recommendations, may need pathological fracture work up as per ortho, nutrition consult pending, may need peg tube  Disposition: daughter wants to take him home

## 2022-03-28 NOTE — SWALLOW BEDSIDE ASSESSMENT ADULT - SWALLOW EVAL: DIAGNOSIS
cognitive impairment impacting assessment, report by daughter signs of oropharyngeal impairment are present.

## 2022-03-28 NOTE — SWALLOW BEDSIDE ASSESSMENT ADULT - COMMENTS
patient with poor attention and participation. RN reports spitting out food, documented patient is combative. Discussed at length with daughter that patient has been having poor intake, patient with concern for glucose management (per daughter) asking for PEG evaluation. reports patient coughs and chokes at meals for years.

## 2022-03-28 NOTE — CONSULT NOTE ADULT - SUBJECTIVE AND OBJECTIVE BOX
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  Has had numerous falls recently and now may need surgery for right femoral neck fracture.    PAST MEDICAL & SURGICAL HISTORY:  Prostate cancer    Dementia    Diabetes      Home Medications:  DONEPEZIL HCL 5 MG TABLET: 1 tab(s) orally once a day (26 Mar 2022 00:46)  Haldol 0.5 mg oral tablet: 1 tab(s) orally once a day, As Needed (02 May 2021 15:39)  Melatonin 5 mg oral tablet: 1 tab(s) orally once a day (at bedtime) (04 May 2021 16:06)  Lantus  Admelog    Current (Non-Endocrine) Meds:  acetaminophen     Tablet .. 650 milliGRAM(s) Oral every 6 hours PRN  aluminum hydroxide/magnesium hydroxide/simethicone Suspension 30 milliLiter(s) Oral every 4 hours PRN  dextrose 5%. 1000 milliLiter(s) IV Continuous <Continuous>  donepezil 5 milliGRAM(s) Oral at bedtime  melatonin 3 milliGRAM(s) Oral at bedtime PRN  morphine  - Injectable 2 milliGRAM(s) IV Push every 6 hours PRN  ondansetron Injectable 4 milliGRAM(s) IV Push every 8 hours PRN  senna 2 Tablet(s) Oral at bedtime  sodium chloride 0.9%. 1000 milliLiter(s) IV Continuous <Continuous>      Current Endocrine Meds:   glucagon  Injectable 1 milliGRAM(s) IntraMuscular once      Allergies:  No Known Allergies      Height (cm): 154.9 (03-25 @ 16:18)    Vital Signs Last 24 Hrs  T(C): 35.9 (26 Mar 2022 14:03), Max: 36.7 (26 Mar 2022 04:54)  T(F): 96.6 (26 Mar 2022 14:03), Max: 98.1 (26 Mar 2022 04:54)  HR: 100 (26 Mar 2022 14:03) (80 - 100)  BP: 115/61 (26 Mar 2022 14:03) (115/61 - 185/60)  BP(mean): --  RR: 20 (26 Mar 2022 14:03) (18 - 20)  SpO2: 95% (25 Mar 2022 21:23) (95% - 99%)  CAPILLARY BLOOD GLUCOSE      POCT Blood Glucose.: 254 mg/dL (26 Mar 2022 11:39)  POCT Blood Glucose.: 221 mg/dL (26 Mar 2022 07:37)  POCT Blood Glucose.: 177 mg/dL (26 Mar 2022 00:37)  POCT Blood Glucose.: 231 mg/dL (25 Mar 2022 21:28)  POCT Blood Glucose.: 60 mg/dL (25 Mar 2022 20:52)  POCT Blood Glucose.: 98 mg/dL (25 Mar 2022 16:54)  POCT Blood Glucose.: 146 mg/dL (25 Mar 2022 16:20)      LABS:                        12.8   8.19  )-----------( 400      ( 26 Mar 2022 06:51 )             41.0     03-26    142  |  101  |  21<H>  ----------------------------<  245<H>  5.2<H>   |  24  |  0.9    Ca    9.5      26 Mar 2022 06:51    TPro  7.2  /  Alb  3.2<L>  /  TBili  0.5  /  DBili  x   /  AST  14  /  ALT  15  /  AlkPhos  146<H>  03-26    PT/INR - ( 25 Mar 2022 16:34 )   PT: 13.30 sec;   INR: 1.16 ratio         PTT - ( 25 Mar 2022 16:34 )  PTT:43.5 sec                                       
pt s&e  has right hip fracture  found in bed, comfortable  legs appear contracted  mild pain of right leg with passive motion    x-rays show subacute hip fracture, suggestion of possible pathologic    impression is subacute right hip frx    initial plan for non-operative mngt  will discuss  with family   possible workup of pathologic frx

## 2022-03-28 NOTE — SWALLOW BEDSIDE ASSESSMENT ADULT - SLP PERTINENT HISTORY OF CURRENT PROBLEM
87 y male with PMH of IDDM, Dementia non-verbal at baseline BIB EMS fo AMS. per EMS patient is usually combative at baseline was noted by daughter to be less combative and not acting himself FS in field was 42 patient received 1 amp D50 and returned to baseline.  daughter on phone reported that patients home aid may have given more insulin than normal (receives 35u Lantus daily but today received a total of 55u today)  daughter reports patient was noted to be hypoglycemic 3 times today.  In addition daughter reports patient has been unable to ambulate for the past 2 weeks and has been holding his right hip. Pt is ambulatory by self at baseline. Pt would tell daughter that his right leg hurts. Daughter stated she examined the area and there was no sign of trauma. Daughter states pt fell a few days ago as well, but pain and decrease in ambulation was on going for 2 weeks. pending work up (potentially) for pathological fx; no surgical management for now pending family discussion

## 2022-03-28 NOTE — PROGRESS NOTE ADULT - SUBJECTIVE AND OBJECTIVE BOX
pt s&e at bedside with lenore present  case discussed  at this point very little pain with passive motion of hip  recommend non-operative mngt of hip frx  Radiology report suggest possible pathologic frx  as per daughter patient does have a hx of prostate CA  or, fracture may also just be sub-acute in nature.  can get CT scan to further evaluate that.

## 2022-03-29 ENCOUNTER — TRANSCRIPTION ENCOUNTER (OUTPATIENT)
Age: 87
End: 2022-03-29

## 2022-03-29 VITALS — WEIGHT: 104.5 LBS

## 2022-03-29 LAB
GLUCOSE BLDC GLUCOMTR-MCNC: 147 MG/DL — HIGH (ref 70–99)
GLUCOSE BLDC GLUCOMTR-MCNC: 188 MG/DL — HIGH (ref 70–99)
GLUCOSE BLDC GLUCOMTR-MCNC: 200 MG/DL — HIGH (ref 70–99)

## 2022-03-29 PROCEDURE — 73700 CT LOWER EXTREMITY W/O DYE: CPT | Mod: 26,RT

## 2022-03-29 PROCEDURE — 99239 HOSP IP/OBS DSCHRG MGMT >30: CPT

## 2022-03-29 NOTE — ADVANCED PRACTICE NURSE CONSULT - ASSESSMENT
Patient is a 87 y male with PMH of IDDM, Dementia non-verbal at baseline BIB EMS fo AMS. per EMS patient is usually combative at baseline was noted by daughter to be less combative and not acting himself FS in field was 42 patient received 1 amp D50 and returned to baseline.  daughter on phone reported that patients home aid may have given more insulin than normal (receives 35u Lantus daily but today received a total of 55u today)  daughter reports patient was noted to be hypoglycemic 3 times today.  In addition daughter reports patient has been unable to ambulate for the past 2 weeks and has been holding his right hip. Pt is ambulatory by self at baseline. Pt would tell daughter that his right leg hurts. Daughter stated she examined the area and there was no sign of trauma. Daughter states pt fell a few days ago as well, but pain and decrease in ambulation was on going for 2 weeks.   per daughter patient remains full code.     PAST MEDICAL & SURGICAL HISTORY:  Prostate cancer  Dementia  Diabetes  No significant past surgical history    Assessment:  Patient received in bed awake but confused . Primary Rn present at bedside at time of assessment.                      Skin assessed-     Wound #1  Location:  R lateral foot and  between  4th and 5th  digit   Type: Foot ulcer   Size: ~3x2  Tissue Description :  Black stable eschar    Wound Exudate : None    Wound Edge:  Intact   Periwound Condition : Dry      Pressure Injury  #1  Location:   L buttock   Type:  Pressure Injury Stage II   Size: ~1x0.5  Tissue Description :   Pink , macerated    Wound Exudate : None    Wound Edge:  Intact   Periwound Condition :  Macerated      Pressure Injury  #2  Location:  R Buttock   Type:  Pressure Injury stage II  Size: 0.5x0.5  Tissue Description :  pink, macerated     Wound Exudate : None    Wound Edge:  Intact   Periwound Condition : Macerated

## 2022-03-29 NOTE — DISCHARGE NOTE NURSING/CASE MANAGEMENT/SOCIAL WORK - PATIENT PORTAL LINK FT
You can access the FollowMyHealth Patient Portal offered by [facility] by registering at the following website: [email or web address]. By joining [employer]’s FollowMyHealth portal, you will also be able to view your health information using other applications (apps) compatible with our system.

## 2022-03-29 NOTE — DISCHARGE NOTE PROVIDER - NSDCCPCAREPLAN_GEN_ALL_CORE_FT
PRINCIPAL DISCHARGE DIAGNOSIS  Diagnosis: Hip fracture  Assessment and Plan of Treatment:       SECONDARY DISCHARGE DIAGNOSES  Diagnosis: Hypoglycemia  Assessment and Plan of Treatment:

## 2022-03-29 NOTE — DISCHARGE NOTE PROVIDER - CARE PROVIDER_API CALL
Julius Roberts)  Orthopaedic Surgery  [address] [place] [address]  Phone: [phone]  Fax: [phone]  Follow Up Time: 1 week

## 2022-03-29 NOTE — DISCHARGE NOTE PROVIDER - HOSPITAL COURSE
Assessment and Plan:   87 y male with PMH of IDDM, Dementia admitted for right hip Fx and hypoglycemia    # Displaced subcapital right femoral neck fracture  #immobility due to femur fracture (CDMP)  - ortho consult, discussed with ortho. no plan for intervention at this time. ortho cleared for discharge. discussed with ortho PA. patient to follow up with ortho as outpatient   daughter told me she is a registered nurse. she does not want to wait till morning (for home pt arrangements). she just wants to take his father home. I gave her printed copy of CT scan report and told her that there is suspicion of neoplastic process vs osteopenia she understands and she said she is going to follow up outpatient.   daughter refused any kind of home health or physical therapy     # Hypoglycemia - mild, resolved  continue home medications     #nutrition  dietician consulted but patients daughter does not want any further consults     # Dementia   - c/w donepezil

## 2022-03-29 NOTE — CDI QUERY NOTE - NSCDIOTHERTXTBX_GEN_ALL_CORE_HH
Clinical Indicators     3/25 H&P Adult: 87 y male with PMH of IDDM, Dementia non-verbal at baseline; … unfortunately pt is incoherent and nonsensical. pt is uncooperative; …     3/28 Consult Note Adult-Orthopedics Attending: … legs appear contracted; …     3/28 Nursing 2.6.0 Assessment and Intervention: AM PAC Functional assessment: Score 6= Total assistance AM PAC Functional assessment Daily activity: score 6= total Assistance; Jony score:  Activity: bedfast (1); mobility (2): very limited     Query     Based on your professional judgment and the clinical indicators, please clarify if total assistance can be further specified as:      • Complete immobility due to severe frailty   • Complete immobility associated with severe disability     • Functional quadriplegia     • Other (please specify):     • Unable to clinically determine     Thank you,   Monique Mata  [phone]

## 2022-03-29 NOTE — ADVANCED PRACTICE NURSE CONSULT - RECOMMEDATIONS
Plan: Clean B/l buttock with soap and  water, pat dry then apply triad hydrophilic dressing         Clean R foot with saline pat dry then apply  betadine with non adhering and Kerlix dressing    Pressure  injury  preventive  measures  skin care   Assess wound and inform primary provider of any changes   Case discussed with primary Rn  Wound/ ostomy specialist  to f/u as needed     Offloading: [x ] Frequent position changes [ ] Devices/Equipment  Cleansing: [ ] Saline [x ] Soap/Water [ ] Other: ______  Topicals: [ x] Barrier Cream [ ] Antimicrobial [ ] Enzymatic Wound Debridement  Dressings: [ ] Dry, sterile [ ] Foam [ ] Absorbant Pads [ ] Collagenase

## 2022-03-29 NOTE — PROGRESS NOTE ADULT - SUBJECTIVE AND OBJECTIVE BOX
GERMANIA PAGAN  87y  Male      Patient is a 87y old  Male who presents with a chief complaint of fall (26 Mar 2022 15:11)  Imaging showed right femoral neck fracture . history obtained from daughter  talked with family. patient may have a fall. previously patient was ambulatory. lives with daughter.   Patient is full code     as per nursing staff, patient has difficulty swallowing food. Speech consulted.   discussed with speech as well. Patient is difficulty to follow commands. Unable to assess as if this is truly as swallowing issue  also discussed with daughter who wants to take her father home. she told me that her father has not been eating well since he has difficulty in ambulation.  she wants peg tube. plan is to start on tube feeding. daughter understands that patient can pull up the tube feeding as he is difficult to follow commands.   will consult gi if family still wants peg tube and patient has persistent nutrition issue.       INTERVAL HPI/OVERNIGHT EVENTS:  none . still pending final ortho recommendation. afebrile     No Known Allergies          FAMILY HISTORY:    LABS:                        12.8   8.19  )-----------( 400      ( 26 Mar 2022 06:51 )             41.0     03-26    142  |  101  |  21<H>  ----------------------------<  245<H>  5.2<H>   |  24  |  0.9    Ca    9.5      26 Mar 2022 06:51    TPro  7.2  /  Alb  3.2<L>  /  TBili  0.5  /  DBili  x   /  AST  14  /  ALT  15  /  AlkPhos  146<H>  03-26    PT/INR - ( 25 Mar 2022 16:34 )   PT: 13.30 sec;   INR: 1.16 ratio         PTT - ( 25 Mar 2022 16:34 )  PTT:43.5 sec        PHYSICAL EXAM:  GENERAL: NAD,  HEAD:  Atraumatic, Normocephalic  EYES: EOMI, PERRLA, conjunctiva and sclera clear  NERVOUS SYSTEM:  Alert   CHEST/LUNG: Clear to percussion bilaterally; No rales, rhonchi, wheezing, or rubs  HEART: Regular rate and rhythm; No murmurs, rubs, or gallops  ABDOMEN: Soft, Nontender, Nondistended; Bowel sounds present  EXTREMITIES: lower extremity in flexed position    Consultant(s) Notes Reviewed:  [x ] YES  [ ] NO  Care Discussed with Consultants/Other Providers [ x] YES  [ ] NO    LABS:                     LIVER FUNCTIONS - ( 26 Mar 2022 06:51 )  Alb: 3.2 g/dL / Pro: 7.2 g/dL / ALK PHOS: 146 U/L / ALT: 15 U/L / AST: 14 U/L / GGT: x                   RADIOLOGY & ADDITIONAL TESTS:    Imaging Personally Reviewed:  [x] YES  [ ] NO    HEALTH ISSUES - PROBLEM Dx:          MEDS:  acetaminophen     Tablet .. 650 milliGRAM(s) Oral every 6 hours PRN  aluminum hydroxide/magnesium hydroxide/simethicone Suspension 30 milliLiter(s) Oral every 4 hours PRN  dextrose 5%. 1000 milliLiter(s) IV Continuous <Continuous>  dextrose 50% Injectable 25 Gram(s) IV Push once  dextrose 50% Injectable 12.5 Gram(s) IV Push once  dextrose 50% Injectable 25 Gram(s) IV Push once  dextrose Oral Gel 15 Gram(s) Oral once PRN  donepezil 5 milliGRAM(s) Oral at bedtime  glucagon  Injectable 1 milliGRAM(s) IntraMuscular once  glucagon  Injectable 1 milliGRAM(s) IntraMuscular once  melatonin 3 milliGRAM(s) Oral at bedtime PRN  morphine  - Injectable 2 milliGRAM(s) IV Push every 6 hours PRN  ondansetron Injectable 4 milliGRAM(s) IV Push every 8 hours PRN  senna 2 Tablet(s) Oral at bedtime  sodium chloride 0.9%. 1000 milliLiter(s) IV Continuous <Continuous>      Assessment and Plan:   87 y male with PMH of IDDM, Dementia admitted for right hip Fx and hypoglycemia    # Displaced subcapital right femoral neck fracture  - ortho consult, discussed with ortho. no plan for intervention at this time. waiting for final recommendations.   - IVF  - morphine 2mg IV Q6H PRN   - bed rest  - Heparin 5,000 units x1, ortho to decide on dvt ppx afterwards.   - am labs    # Hypoglycemia - mild, resolved  - endocrinology consult  - monitor FS  - sulfonylurea serum   -started on lantus at night as per endo recommendations  -started on soft diet     #nutrition  consulted dietician  family wants peg tube,  discussed with speech who unable to assess whether patient truly has swallowing issues as patient unable to follow command properly    # Dementia   - c/w donepezil    # Constipation  - senna          Discharge planning:   Consults: Ortho, dietician  Discharge barriers: pending final ortho recommendations, may need pathological fracture work up as per ortho, nutrition consult pending, may need peg tube  Disposition: daughter wants to take him home    GERMANIA PAGAN  87y  Male      Patient is a 87y old  Male who presents with a chief complaint of fall (26 Mar 2022 15:11)  Imaging showed right femoral neck fracture . history obtained from daughter  talked with family. patient may have a fall. previously patient was ambulatory. lives with daughter.   Patient is full code     as per nursing staff, patient has difficulty swallowing food. Speech consulted.   discussed with speech as well. Patient is difficulty to follow commands. Unable to assess as if this is truly as swallowing issue  also discussed with daughter who wants to take her father home. she told me that her father has not been eating well since he has difficulty in ambulation.  she wants peg tube. plan is to start on tube feeding. daughter understands that patient can pull up the tube feeding as he is difficult to follow commands.   will consult gi if family still wants peg tube and patient has persistent nutrition issue.     3/29: consulted GI for peg tube assessment. daughter wants peg tube. also consulted dietician for tube feeding temporarily . family wants to start on tube feeding.       INTERVAL HPI/OVERNIGHT EVENTS:  none . afebrile     No Known Allergies          FAMILY HISTORY:    LABS:\                            12.8   8.19  )-----------( 400      ( 26 Mar 2022 06:51 )             41.0     03-26    142  |  101  |  21<H>  ----------------------------<  245<H>  5.2<H>   |  24  |  0.9    Ca    9.5      26 Mar 2022 06:51    TPro  7.2  /  Alb  3.2<L>  /  TBili  0.5  /  DBili  x   /  AST  14  /  ALT  15  /  AlkPhos  146<H>  03-26    PT/INR - ( 25 Mar 2022 16:34 )   PT: 13.30 sec;   INR: 1.16 ratio         PTT - ( 25 Mar 2022 16:34 )  PTT:43.5 sec        PHYSICAL EXAM:  GENERAL: NAD,  HEAD:  Atraumatic, Normocephalic  EYES: EOMI, PERRLA, conjunctiva and sclera clear  NERVOUS SYSTEM:  Alert   CHEST/LUNG: Clear to percussion bilaterally; No rales, rhonchi, wheezing, or rubs  HEART: Regular rate and rhythm; No murmurs, rubs, or gallops  ABDOMEN: Soft, Nontender, Nondistended; Bowel sounds present  EXTREMITIES: lower extremity in flexed position    Consultant(s) Notes Reviewed:  [x ] YES  [ ] NO  Care Discussed with Consultants/Other Providers [ x] YES  [ ] NO    LABS:                     LIVER FUNCTIONS - ( 26 Mar 2022 06:51 )  Alb: 3.2 g/dL / Pro: 7.2 g/dL / ALK PHOS: 146 U/L / ALT: 15 U/L / AST: 14 U/L / GGT: x                   RADIOLOGY & ADDITIONAL TESTS:    Imaging Personally Reviewed:  [x] YES  [ ] NO    HEALTH ISSUES - PROBLEM Dx:          MEDS:  acetaminophen     Tablet .. 650 milliGRAM(s) Oral every 6 hours PRN  aluminum hydroxide/magnesium hydroxide/simethicone Suspension 30 milliLiter(s) Oral every 4 hours PRN  dextrose 5%. 1000 milliLiter(s) IV Continuous <Continuous>  dextrose 50% Injectable 25 Gram(s) IV Push once  dextrose 50% Injectable 12.5 Gram(s) IV Push once  dextrose 50% Injectable 25 Gram(s) IV Push once  dextrose Oral Gel 15 Gram(s) Oral once PRN  donepezil 5 milliGRAM(s) Oral at bedtime  glucagon  Injectable 1 milliGRAM(s) IntraMuscular once  glucagon  Injectable 1 milliGRAM(s) IntraMuscular once  melatonin 3 milliGRAM(s) Oral at bedtime PRN  morphine  - Injectable 2 milliGRAM(s) IV Push every 6 hours PRN  ondansetron Injectable 4 milliGRAM(s) IV Push every 8 hours PRN  senna 2 Tablet(s) Oral at bedtime  sodium chloride 0.9%. 1000 milliLiter(s) IV Continuous <Continuous>      Assessment and Plan:   87 y male with PMH of IDDM, Dementia admitted for right hip Fx and hypoglycemia    # Displaced subcapital right femoral neck fracture  - ortho consult, discussed with ortho. no plan for intervention at this time. waiting for final recommendations.   - IVF  - morphine 2mg IV Q6H PRN   - bed rest  - Heparin 5,000 units x1, ortho to decide on dvt ppx afterwards.   - am labs    # Hypoglycemia - mild, resolved  - endocrinology consult  - monitor FS  - sulfonylurea serum   -started on lantus at night as per endo recommendations  -started on soft diet     #nutrition  consulted dietician  family wants peg tube,  discussed with speech who unable to assess whether patient truly has swallowing issues as patient unable to follow command properly  consulted dietiician fortube feeding  also consulted GI for possible peg placement as per daughter wishes who is RN as well.     # Dementia   - c/w donepezil    # Constipation  - senna      Discharge planning:  Consults: Ortho, GI, nutrition   Barriers for discharge: need clearance from ortho, nutoition, GI, may need peg tube   Disposition: likely home     Handoff: follow Ortho, GI , ***        Discharge planning:   Consults: Ortho, dietician  Discharge barriers: pending final ortho recommendations, may need pathological fracture work up as per ortho, nutrition consult pending, may need peg tube  Disposition: daughter wants to take him home    GERMANIA PAGAN  87y  Male      Patient is a 87y old  Male who presents with a chief complaint of fall (26 Mar 2022 15:11)  Imaging showed right femoral neck fracture . history obtained from daughter  talked with family. patient may have a fall. previously patient was ambulatory. lives with daughter.   Patient is full code     as per nursing staff, patient has difficulty swallowing food. Speech consulted.   discussed with speech as well. Patient is difficulty to follow commands. Unable to assess as if this is truly as swallowing issue  also discussed with daughter who wants to take her father home. she told me that her father has not been eating well since he has difficulty in ambulation.  she wants peg tube. plan is to start on tube feeding. daughter understands that patient can pull up the tube feeding as he is difficult to follow commands.   will consult gi if family still wants peg tube and patient has persistent nutrition issue.     3/29: consulted GI for peg tube assessment. daughter wants peg tube. also consulted dietician for tube feeding temporarily . family wants to start on tube feeding.       INTERVAL HPI/OVERNIGHT EVENTS:  none . afebrile     No Known Allergies          FAMILY HISTORY:    LABS:\                            12.8   8.19  )-----------( 400      ( 26 Mar 2022 06:51 )             41.0     03-26    142  |  101  |  21<H>  ----------------------------<  245<H>  5.2<H>   |  24  |  0.9    Ca    9.5      26 Mar 2022 06:51    TPro  7.2  /  Alb  3.2<L>  /  TBili  0.5  /  DBili  x   /  AST  14  /  ALT  15  /  AlkPhos  146<H>  03-26    PT/INR - ( 25 Mar 2022 16:34 )   PT: 13.30 sec;   INR: 1.16 ratio         PTT - ( 25 Mar 2022 16:34 )  PTT:43.5 sec        PHYSICAL EXAM:  GENERAL: NAD,  HEAD:  Atraumatic, Normocephalic  EYES: EOMI, PERRLA, conjunctiva and sclera clear  NERVOUS SYSTEM:  Alert   CHEST/LUNG: Clear to percussion bilaterally; No rales, rhonchi, wheezing, or rubs  HEART: Regular rate and rhythm; No murmurs, rubs, or gallops  ABDOMEN: Soft, Nontender, Nondistended; Bowel sounds present  EXTREMITIES: lower extremity in flexed position    Consultant(s) Notes Reviewed:  [x ] YES  [ ] NO  Care Discussed with Consultants/Other Providers [ x] YES  [ ] NO    LABS:                     LIVER FUNCTIONS - ( 26 Mar 2022 06:51 )  Alb: 3.2 g/dL / Pro: 7.2 g/dL / ALK PHOS: 146 U/L / ALT: 15 U/L / AST: 14 U/L / GGT: x                   RADIOLOGY & ADDITIONAL TESTS:    Imaging Personally Reviewed:  [x] YES  [ ] NO    HEALTH ISSUES - PROBLEM Dx:          MEDS:  acetaminophen     Tablet .. 650 milliGRAM(s) Oral every 6 hours PRN  aluminum hydroxide/magnesium hydroxide/simethicone Suspension 30 milliLiter(s) Oral every 4 hours PRN  dextrose 5%. 1000 milliLiter(s) IV Continuous <Continuous>  dextrose 50% Injectable 25 Gram(s) IV Push once  dextrose 50% Injectable 12.5 Gram(s) IV Push once  dextrose 50% Injectable 25 Gram(s) IV Push once  dextrose Oral Gel 15 Gram(s) Oral once PRN  donepezil 5 milliGRAM(s) Oral at bedtime  glucagon  Injectable 1 milliGRAM(s) IntraMuscular once  glucagon  Injectable 1 milliGRAM(s) IntraMuscular once  melatonin 3 milliGRAM(s) Oral at bedtime PRN  morphine  - Injectable 2 milliGRAM(s) IV Push every 6 hours PRN  ondansetron Injectable 4 milliGRAM(s) IV Push every 8 hours PRN  senna 2 Tablet(s) Oral at bedtime  sodium chloride 0.9%. 1000 milliLiter(s) IV Continuous <Continuous>      Assessment and Plan:   87 y male with PMH of IDDM, Dementia admitted for right hip Fx and hypoglycemia    # Displaced subcapital right femoral neck fracture  #immobility due to femur fracture (CDMP)  - ortho consult, discussed with ortho. no plan for intervention at this time. waiting for final recommendations.   - IVF  - morphine 2mg IV Q6H PRN   - bed rest  - Heparin 5,000 units x1, ortho to decide on dvt ppx afterwards.   - am labs    # Hypoglycemia - mild, resolved  - endocrinology consult  - monitor FS  - sulfonylurea serum   -started on lantus at night as per endo recommendations  -started on soft diet     #nutrition  consulted dietician  family wants peg tube,  discussed with speech who unable to assess whether patient truly has swallowing issues as patient unable to follow command properly  consulted dietiician fortube feeding  also consulted GI for possible peg placement as per daughter wishes who is RN as well.     # Dementia   - c/w donepezil    # Constipation  - senna      Discharge planning:  Consults: Ortho, GI, nutrition   Barriers for discharge: need clearance from ortho, nutoition, GI, may need peg tube   Disposition: likely home     Handoff: follow Ortho, GI , ***        Discharge planning:   Consults: Ortho, dietician. GI  Discharge barriers: pending final ortho recommendations, may need pathological fracture work up as per ortho, nutrition consult pending, GI consult for peg tube assessment  Disposition: daughter wants to take him home     Handoff: Follow dietician and GI recommendation ortho needs to give recommendations regarding PT/Weight bearing?   GERMANIA PAGAN  87y  Male      Patient is a 87y old  Male who presents with a chief complaint of fall (26 Mar 2022 15:11)  Imaging showed right femoral neck fracture . history obtained from daughter  talked with family. patient may have a fall. previously patient was ambulatory. lives with daughter.   Patient is full code     as per nursing staff, patient has difficulty swallowing food. Speech consulted.   discussed with speech as well. Patient is difficulty to follow commands. Unable to assess as if this is truly as swallowing issue  also discussed with daughter who wants to take her father home. she told me that her father has not been eating well since he has difficulty in ambulation.  she wants peg tube. plan is to start on tube feeding. daughter understands that patient can pull up the tube feeding as he is difficult to follow commands.   will consult gi if family still wants peg tube and patient has persistent nutrition issue.     3/29: consulted GI for peg tube assessment. daughter wants peg tube. also consulted dietician for tube feeding temporarily . family wants to start on tube feeding.       INTERVAL HPI/OVERNIGHT EVENTS:  none . afebrile     No Known Allergies          FAMILY HISTORY:    LABS:\                            12.8   8.19  )-----------( 400      ( 26 Mar 2022 06:51 )             41.0     03-26    142  |  101  |  21<H>  ----------------------------<  245<H>  5.2<H>   |  24  |  0.9    Ca    9.5      26 Mar 2022 06:51    TPro  7.2  /  Alb  3.2<L>  /  TBili  0.5  /  DBili  x   /  AST  14  /  ALT  15  /  AlkPhos  146<H>  03-26    PT/INR - ( 25 Mar 2022 16:34 )   PT: 13.30 sec;   INR: 1.16 ratio         PTT - ( 25 Mar 2022 16:34 )  PTT:43.5 sec        PHYSICAL EXAM:  GENERAL: NAD,  HEAD:  Atraumatic, Normocephalic  EYES: EOMI, PERRLA, conjunctiva and sclera clear  NERVOUS SYSTEM:  Alert   CHEST/LUNG: Clear to percussion bilaterally; No rales, rhonchi, wheezing, or rubs  HEART: Regular rate and rhythm; No murmurs, rubs, or gallops  ABDOMEN: Soft, Nontender, Nondistended; Bowel sounds present  EXTREMITIES: lower extremity in flexed position    Consultant(s) Notes Reviewed:  [x ] YES  [ ] NO  Care Discussed with Consultants/Other Providers [ x] YES  [ ] NO    LABS:                     LIVER FUNCTIONS - ( 26 Mar 2022 06:51 )  Alb: 3.2 g/dL / Pro: 7.2 g/dL / ALK PHOS: 146 U/L / ALT: 15 U/L / AST: 14 U/L / GGT: x                   RADIOLOGY & ADDITIONAL TESTS:    Imaging Personally Reviewed:  [x] YES  [ ] NO    HEALTH ISSUES - PROBLEM Dx:          MEDS:  acetaminophen     Tablet .. 650 milliGRAM(s) Oral every 6 hours PRN  aluminum hydroxide/magnesium hydroxide/simethicone Suspension 30 milliLiter(s) Oral every 4 hours PRN  dextrose 5%. 1000 milliLiter(s) IV Continuous <Continuous>  dextrose 50% Injectable 25 Gram(s) IV Push once  dextrose 50% Injectable 12.5 Gram(s) IV Push once  dextrose 50% Injectable 25 Gram(s) IV Push once  dextrose Oral Gel 15 Gram(s) Oral once PRN  donepezil 5 milliGRAM(s) Oral at bedtime  glucagon  Injectable 1 milliGRAM(s) IntraMuscular once  glucagon  Injectable 1 milliGRAM(s) IntraMuscular once  melatonin 3 milliGRAM(s) Oral at bedtime PRN  morphine  - Injectable 2 milliGRAM(s) IV Push every 6 hours PRN  ondansetron Injectable 4 milliGRAM(s) IV Push every 8 hours PRN  senna 2 Tablet(s) Oral at bedtime  sodium chloride 0.9%. 1000 milliLiter(s) IV Continuous <Continuous>      Assessment and Plan:   87 y male with PMH of IDDM, Dementia admitted for right hip Fx and hypoglycemia    # Displaced subcapital right femoral neck fracture  #immobility due to femur fracture (CDMP)  - ortho consult, discussed with ortho. no plan for intervention at this time. waiting for final recommendations.   - IVF  - morphine 2mg IV Q6H PRN   - bed rest  - Heparin 5,000 units x1, ortho to decide on dvt ppx afterwards.   - am labs    # Hypoglycemia - mild, resolved  - endocrinology consult  - monitor FS  - sulfonylurea serum   -started on lantus at night as per endo recommendations  -started on soft diet     #nutrition  consulted dietician  family wants peg tube,  discussed with speech who unable to assess whether patient truly has swallowing issues as patient unable to follow command properly  consulted dietiician fortube feeding  also consulted GI for possible peg placement as per daughter wishes who is RN as well.     # Dementia   - c/w donepezil    # Constipation  - senna      Discharge planning:  Consults: Ortho, GI, nutrition   Barriers for discharge: need clearance from ortho, nutrition,  Disposition: likely home     Handoff: follow Ortho,         Discharge planning:   Consults: Ortho, dietician. GI  Discharge barriers: pending final ortho recommendations, may need pathological fracture work up as per ortho, nutrition consult pending, GI consult for peg tube assessment  Disposition: daughter wants to take him home     Handoff: Follow dietician ,ortho needs to give recommendations regarding PT/Weight bearing?

## 2022-03-29 NOTE — DISCHARGE NOTE PROVIDER - NSDCMRMEDTOKEN_GEN_ALL_CORE_FT
Admelog SoloStar 100 units/mL injectable solution: 5 unit(s) subcutaneous 3 times a day (with meals)   DONEPEZIL HCL 5 MG TABLET: 1 tab(s) orally once a day  glucometer (per patient&#x27;s insurance): Test blood sugars four times a day. Dispense #1 glucometer.  Haldol 0.5 mg oral tablet: 1 tab(s) orally once a day, As Needed  Insulin Pen Needles, 4mm: 1 application subcutaneously 4 times a day. ** Use with insulin pen **   lancets: 1 application subcutaneously 4 times a day   Lantus Solostar Pen 100 units/mL subcutaneous solution: 15 unit(s) subcutaneous once a day (at bedtime)   Melatonin 5 mg oral tablet: 1 tab(s) orally once a day (at bedtime)  polyethylene glycol 3350 oral powder for reconstitution: 17 gram(s) orally once a day  test strips (per patient&#x27;s insurance): 1 application subcutaneously 4 times a day. ** Compatible with patient&#x27;s glucometer **

## 2022-03-29 NOTE — DISCHARGE NOTE NURSING/CASE MANAGEMENT/SOCIAL WORK - NSDCPEFALRISK_GEN_ALL_CORE
For information on Fall & Injury Prevention, visit: [email or web address] OR  [email or web address] OR  https://www.cdc.gov/steadi/patient.html

## 2022-03-29 NOTE — DISCHARGE NOTE PROVIDER - ATTENDING DISCHARGE PHYSICAL EXAMINATION:
patient seen. Patient clinically stable. Daughter wants to take patient home. She does not want any home health or home pt. advised daughter to follow up with orthopedics as there is suspicion of neoplastic process. daughter understood and agreed with plan. she understands the risk of not following up. She told me that she is a RN and going to follow up.

## 2022-03-29 NOTE — DISCHARGE NOTE PROVIDER - NS AS DC PROVIDER CONTACT Y/N MULTI
66y Female with h/o left RCC s/p nephrectomy ( 2015 at [facility] ), HTN, and severe AS, s/p AVR
Yes

## 2022-04-04 LAB — SULFONYLUREA SERPL-MCNC: SIGNIFICANT CHANGE UP

## 2022-04-05 DIAGNOSIS — M84.651A PATHOLOGICAL FRACTURE IN OTHER DISEASE, RIGHT FEMUR, INITIAL ENCOUNTER FOR FRACTURE: ICD-10-CM

## 2022-04-05 DIAGNOSIS — M84.551A PATHOLOGICAL FRACTURE IN NEOPLASTIC DISEASE, RIGHT FEMUR, INITIAL ENCOUNTER FOR FRACTURE: ICD-10-CM

## 2022-04-05 DIAGNOSIS — L89.322 PRESSURE ULCER OF LEFT BUTTOCK, STAGE 2: ICD-10-CM

## 2022-04-05 DIAGNOSIS — L97.519 NON-PRESSURE CHRONIC ULCER OF OTHER PART OF RIGHT FOOT WITH UNSPECIFIED SEVERITY: ICD-10-CM

## 2022-04-05 DIAGNOSIS — K59.00 CONSTIPATION, UNSPECIFIED: ICD-10-CM

## 2022-04-05 DIAGNOSIS — E11.649 TYPE 2 DIABETES MELLITUS WITH HYPOGLYCEMIA WITHOUT COMA: ICD-10-CM

## 2022-04-05 DIAGNOSIS — L89.312 PRESSURE ULCER OF RIGHT BUTTOCK, STAGE 2: ICD-10-CM

## 2022-04-05 DIAGNOSIS — Z85.46 PERSONAL HISTORY OF MALIGNANT NEOPLASM OF PROSTATE: ICD-10-CM

## 2022-04-05 DIAGNOSIS — F03.91 UNSPECIFIED DEMENTIA WITH BEHAVIORAL DISTURBANCE: ICD-10-CM

## 2022-04-05 DIAGNOSIS — F03.90 UNSPECIFIED DEMENTIA WITHOUT BEHAVIORAL DISTURBANCE: ICD-10-CM

## 2022-04-05 DIAGNOSIS — Z79.4 LONG TERM (CURRENT) USE OF INSULIN: ICD-10-CM

## 2022-04-05 DIAGNOSIS — M85.851 OTHER SPECIFIED DISORDERS OF BONE DENSITY AND STRUCTURE, RIGHT THIGH: ICD-10-CM

## 2022-04-05 DIAGNOSIS — S72.91XA UNSPECIFIED FRACTURE OF RIGHT FEMUR, INITIAL ENCOUNTER FOR CLOSED FRACTURE: ICD-10-CM

## 2022-04-13 ENCOUNTER — INPATIENT (INPATIENT)
Facility: HOSPITAL | Age: 87
LOS: 7 days | Discharge: HOSPICE | End: 2022-04-21
Attending: INTERNAL MEDICINE | Admitting: INTERNAL MEDICINE
Payer: MEDICARE

## 2022-04-13 VITALS
HEIGHT: 61 IN | WEIGHT: 95.02 LBS | DIASTOLIC BLOOD PRESSURE: 51 MMHG | SYSTOLIC BLOOD PRESSURE: 109 MMHG | RESPIRATION RATE: 20 BRPM | HEART RATE: 116 BPM

## 2022-04-13 LAB
ALBUMIN SERPL ELPH-MCNC: 2.8 G/DL — LOW (ref 3.5–5.2)
ALP SERPL-CCNC: 155 U/L — HIGH (ref 30–115)
ALT FLD-CCNC: 16 U/L — SIGNIFICANT CHANGE UP (ref 0–41)
ANION GAP SERPL CALC-SCNC: 20 MMOL/L — HIGH (ref 7–14)
ANION GAP SERPL CALC-SCNC: 34 MMOL/L — HIGH (ref 7–14)
APPEARANCE UR: CLEAR — SIGNIFICANT CHANGE UP
APTT BLD: 33.2 SEC — SIGNIFICANT CHANGE UP (ref 27–39.2)
AST SERPL-CCNC: 12 U/L — SIGNIFICANT CHANGE UP (ref 0–41)
B-OH-BUTYR SERPL-SCNC: 8.5 MMOL/L — HIGH
BACTERIA # UR AUTO: ABNORMAL
BASE EXCESS BLDV CALC-SCNC: -12.3 MMOL/L — LOW (ref -2–3)
BASE EXCESS BLDV CALC-SCNC: -12.9 MMOL/L — LOW (ref -2–3)
BASOPHILS # BLD AUTO: 0.08 K/UL — SIGNIFICANT CHANGE UP (ref 0–0.2)
BASOPHILS NFR BLD AUTO: 0.5 % — SIGNIFICANT CHANGE UP (ref 0–1)
BILIRUB SERPL-MCNC: 0.9 MG/DL — SIGNIFICANT CHANGE UP (ref 0.2–1.2)
BILIRUB UR-MCNC: ABNORMAL
BUN SERPL-MCNC: 49 MG/DL — HIGH (ref 10–20)
BUN SERPL-MCNC: 62 MG/DL — CRITICAL HIGH (ref 10–20)
CA-I SERPL-SCNC: 0.97 MMOL/L — LOW (ref 1.15–1.33)
CA-I SERPL-SCNC: 1.1 MMOL/L — LOW (ref 1.15–1.33)
CALCIUM SERPL-MCNC: 6.8 MG/DL — LOW (ref 8.5–10.1)
CALCIUM SERPL-MCNC: 8.6 MG/DL — SIGNIFICANT CHANGE UP (ref 8.5–10.1)
CHLORIDE SERPL-SCNC: 105 MMOL/L — SIGNIFICANT CHANGE UP (ref 98–110)
CHLORIDE SERPL-SCNC: 107 MMOL/L — SIGNIFICANT CHANGE UP (ref 98–110)
CK SERPL-CCNC: 305 U/L — HIGH (ref 0–225)
CO2 SERPL-SCNC: 12 MMOL/L — LOW (ref 17–32)
CO2 SERPL-SCNC: 15 MMOL/L — LOW (ref 17–32)
COLOR SPEC: YELLOW — SIGNIFICANT CHANGE UP
CREAT SERPL-MCNC: 1.7 MG/DL — HIGH (ref 0.7–1.5)
CREAT SERPL-MCNC: 2.4 MG/DL — HIGH (ref 0.7–1.5)
DIFF PNL FLD: ABNORMAL
EGFR: 25 ML/MIN/1.73M2 — LOW
EGFR: 39 ML/MIN/1.73M2 — LOW
EOSINOPHIL # BLD AUTO: 0 K/UL — SIGNIFICANT CHANGE UP (ref 0–0.7)
EOSINOPHIL NFR BLD AUTO: 0 % — SIGNIFICANT CHANGE UP (ref 0–8)
EPI CELLS # UR: SIGNIFICANT CHANGE UP
GAS PNL BLDV: 134 MMOL/L — LOW (ref 136–145)
GAS PNL BLDV: 152 MMOL/L — HIGH (ref 136–145)
GAS PNL BLDV: SIGNIFICANT CHANGE UP
GAS PNL BLDV: SIGNIFICANT CHANGE UP
GLUCOSE BLDC GLUCOMTR-MCNC: 427 MG/DL — HIGH (ref 70–99)
GLUCOSE BLDC GLUCOMTR-MCNC: 437 MG/DL — HIGH (ref 70–99)
GLUCOSE BLDC GLUCOMTR-MCNC: 465 MG/DL — CRITICAL HIGH (ref 70–99)
GLUCOSE SERPL-MCNC: 413 MG/DL — HIGH (ref 70–99)
GLUCOSE SERPL-MCNC: 554 MG/DL — CRITICAL HIGH (ref 70–99)
GLUCOSE UR QL: ABNORMAL
HCO3 BLDV-SCNC: 13 MMOL/L — LOW (ref 22–29)
HCO3 BLDV-SCNC: 13 MMOL/L — LOW (ref 22–29)
HCT VFR BLD CALC: 40.7 % — LOW (ref 42–52)
HCT VFR BLDA CALC: 24 % — LOW (ref 39–51)
HCT VFR BLDA CALC: 35 % — LOW (ref 39–51)
HGB BLD CALC-MCNC: 11.6 G/DL — LOW (ref 12.6–17.4)
HGB BLD CALC-MCNC: 7.9 G/DL — LOW (ref 12.6–17.4)
HGB BLD-MCNC: 11.8 G/DL — LOW (ref 14–18)
IMM GRANULOCYTES NFR BLD AUTO: 0.6 % — HIGH (ref 0.1–0.3)
INR BLD: 1.13 RATIO — SIGNIFICANT CHANGE UP (ref 0.65–1.3)
KETONES UR-MCNC: ABNORMAL
LACTATE BLDV-MCNC: 2.9 MMOL/L — HIGH (ref 0.5–2)
LACTATE BLDV-MCNC: 4 MMOL/L — CRITICAL HIGH (ref 0.5–2)
LACTATE SERPL-SCNC: 3.4 MMOL/L — HIGH (ref 0.7–2)
LEUKOCYTE ESTERASE UR-ACNC: NEGATIVE — SIGNIFICANT CHANGE UP
LIDOCAIN IGE QN: 14 U/L — SIGNIFICANT CHANGE UP (ref 7–60)
LYMPHOCYTES # BLD AUTO: 1.02 K/UL — LOW (ref 1.2–3.4)
LYMPHOCYTES # BLD AUTO: 6.1 % — LOW (ref 20.5–51.1)
MCHC RBC-ENTMCNC: 26.4 PG — LOW (ref 27–31)
MCHC RBC-ENTMCNC: 29 G/DL — LOW (ref 32–37)
MCV RBC AUTO: 91.1 FL — SIGNIFICANT CHANGE UP (ref 80–94)
MONOCYTES # BLD AUTO: 0.89 K/UL — HIGH (ref 0.1–0.6)
MONOCYTES NFR BLD AUTO: 5.3 % — SIGNIFICANT CHANGE UP (ref 1.7–9.3)
NEUTROPHILS # BLD AUTO: 14.75 K/UL — HIGH (ref 1.4–6.5)
NEUTROPHILS NFR BLD AUTO: 87.5 % — HIGH (ref 42.2–75.2)
NITRITE UR-MCNC: NEGATIVE — SIGNIFICANT CHANGE UP
NRBC # BLD: 0 /100 WBCS — SIGNIFICANT CHANGE UP (ref 0–0)
OSMOLALITY SERPL: 380 MOS/KG — HIGH (ref 280–301)
PCO2 BLDV: 29 MMHG — LOW (ref 42–55)
PCO2 BLDV: 29 MMHG — LOW (ref 42–55)
PH BLDV: 7.26 — LOW (ref 7.32–7.43)
PH BLDV: 7.27 — LOW (ref 7.32–7.43)
PH UR: 6 — SIGNIFICANT CHANGE UP (ref 5–8)
PLATELET # BLD AUTO: 409 K/UL — HIGH (ref 130–400)
PO2 BLDV: 35 MMHG — SIGNIFICANT CHANGE UP
PO2 BLDV: 37 MMHG — SIGNIFICANT CHANGE UP
POTASSIUM BLDV-SCNC: 3.3 MMOL/L — LOW (ref 3.5–5.1)
POTASSIUM BLDV-SCNC: 4.6 MMOL/L — SIGNIFICANT CHANGE UP (ref 3.5–5.1)
POTASSIUM SERPL-MCNC: 3.6 MMOL/L — SIGNIFICANT CHANGE UP (ref 3.5–5)
POTASSIUM SERPL-MCNC: 5.6 MMOL/L — HIGH (ref 3.5–5)
POTASSIUM SERPL-SCNC: 3.6 MMOL/L — SIGNIFICANT CHANGE UP (ref 3.5–5)
POTASSIUM SERPL-SCNC: 5.6 MMOL/L — HIGH (ref 3.5–5)
PROT SERPL-MCNC: 7.3 G/DL — SIGNIFICANT CHANGE UP (ref 6–8)
PROT UR-MCNC: ABNORMAL
PROTHROM AB SERPL-ACNC: 13 SEC — HIGH (ref 9.95–12.87)
RBC # BLD: 4.47 M/UL — LOW (ref 4.7–6.1)
RBC # FLD: 19.6 % — HIGH (ref 11.5–14.5)
RBC CASTS # UR COMP ASSIST: >50 /HPF — HIGH (ref 0–4)
SAO2 % BLDV: 46.5 % — SIGNIFICANT CHANGE UP
SAO2 % BLDV: 48.9 % — SIGNIFICANT CHANGE UP
SARS-COV-2 RNA SPEC QL NAA+PROBE: SIGNIFICANT CHANGE UP
SODIUM SERPL-SCNC: 140 MMOL/L — SIGNIFICANT CHANGE UP (ref 135–146)
SODIUM SERPL-SCNC: 153 MMOL/L — HIGH (ref 135–146)
SP GR SPEC: 1.03 — SIGNIFICANT CHANGE UP (ref 1.01–1.03)
UROBILINOGEN FLD QL: SIGNIFICANT CHANGE UP
WBC # BLD: 16.84 K/UL — HIGH (ref 4.8–10.8)
WBC # FLD AUTO: 16.84 K/UL — HIGH (ref 4.8–10.8)
WBC UR QL: SIGNIFICANT CHANGE UP /HPF (ref 0–5)

## 2022-04-13 PROCEDURE — 99285 EMERGENCY DEPT VISIT HI MDM: CPT | Mod: FS

## 2022-04-13 PROCEDURE — 71045 X-RAY EXAM CHEST 1 VIEW: CPT | Mod: 26

## 2022-04-13 PROCEDURE — 93010 ELECTROCARDIOGRAM REPORT: CPT

## 2022-04-13 PROCEDURE — 74176 CT ABD & PELVIS W/O CONTRAST: CPT | Mod: 26,MA

## 2022-04-13 RX ORDER — CEFEPIME 1 G/1
1000 INJECTION, POWDER, FOR SOLUTION INTRAMUSCULAR; INTRAVENOUS ONCE
Refills: 0 | Status: COMPLETED | OUTPATIENT
Start: 2022-04-13 | End: 2022-04-13

## 2022-04-13 RX ORDER — SODIUM CHLORIDE 9 MG/ML
1000 INJECTION INTRAMUSCULAR; INTRAVENOUS; SUBCUTANEOUS ONCE
Refills: 0 | Status: COMPLETED | OUTPATIENT
Start: 2022-04-13 | End: 2022-04-13

## 2022-04-13 RX ORDER — SODIUM CHLORIDE 9 MG/ML
1000 INJECTION, SOLUTION INTRAVENOUS
Refills: 0 | Status: DISCONTINUED | OUTPATIENT
Start: 2022-04-13 | End: 2022-04-13

## 2022-04-13 RX ORDER — PANTOPRAZOLE SODIUM 20 MG/1
40 TABLET, DELAYED RELEASE ORAL
Refills: 0 | Status: DISCONTINUED | OUTPATIENT
Start: 2022-04-13 | End: 2022-04-14

## 2022-04-13 RX ORDER — VANCOMYCIN HCL 1 G
1000 VIAL (EA) INTRAVENOUS ONCE
Refills: 0 | Status: COMPLETED | OUTPATIENT
Start: 2022-04-13 | End: 2022-04-13

## 2022-04-13 RX ORDER — INSULIN HUMAN 100 [IU]/ML
6 INJECTION, SOLUTION SUBCUTANEOUS
Qty: 100 | Refills: 0 | Status: DISCONTINUED | OUTPATIENT
Start: 2022-04-13 | End: 2022-04-14

## 2022-04-13 RX ORDER — DEXTROSE MONOHYDRATE, SODIUM CHLORIDE, AND POTASSIUM CHLORIDE 50; .745; 4.5 G/1000ML; G/1000ML; G/1000ML
1000 INJECTION, SOLUTION INTRAVENOUS
Refills: 0 | Status: DISCONTINUED | OUTPATIENT
Start: 2022-04-13 | End: 2022-04-14

## 2022-04-13 RX ORDER — HEPARIN SODIUM 5000 [USP'U]/ML
5000 INJECTION INTRAVENOUS; SUBCUTANEOUS EVERY 12 HOURS
Refills: 0 | Status: DISCONTINUED | OUTPATIENT
Start: 2022-04-13 | End: 2022-04-21

## 2022-04-13 RX ORDER — SODIUM CHLORIDE 9 MG/ML
1000 INJECTION, SOLUTION INTRAVENOUS ONCE
Refills: 0 | Status: COMPLETED | OUTPATIENT
Start: 2022-04-13 | End: 2022-04-13

## 2022-04-13 RX ORDER — POLYETHYLENE GLYCOL 3350 17 G/17G
17 POWDER, FOR SOLUTION ORAL DAILY
Refills: 0 | Status: DISCONTINUED | OUTPATIENT
Start: 2022-04-13 | End: 2022-04-21

## 2022-04-13 RX ORDER — HALOPERIDOL DECANOATE 100 MG/ML
0.5 INJECTION INTRAMUSCULAR AT BEDTIME
Refills: 0 | Status: DISCONTINUED | OUTPATIENT
Start: 2022-04-13 | End: 2022-04-21

## 2022-04-13 RX ORDER — CHLORHEXIDINE GLUCONATE 213 G/1000ML
1 SOLUTION TOPICAL DAILY
Refills: 0 | Status: DISCONTINUED | OUTPATIENT
Start: 2022-04-13 | End: 2022-04-21

## 2022-04-13 RX ORDER — DONEPEZIL HYDROCHLORIDE 10 MG/1
5 TABLET, FILM COATED ORAL AT BEDTIME
Refills: 0 | Status: DISCONTINUED | OUTPATIENT
Start: 2022-04-13 | End: 2022-04-21

## 2022-04-13 RX ORDER — INSULIN HUMAN 100 [IU]/ML
8 INJECTION, SOLUTION SUBCUTANEOUS ONCE
Refills: 0 | Status: COMPLETED | OUTPATIENT
Start: 2022-04-13 | End: 2022-04-13

## 2022-04-13 RX ADMIN — INSULIN HUMAN 5 UNIT(S)/HR: 100 INJECTION, SOLUTION SUBCUTANEOUS at 19:39

## 2022-04-13 RX ADMIN — INSULIN HUMAN 8 UNIT(S): 100 INJECTION, SOLUTION SUBCUTANEOUS at 18:45

## 2022-04-13 RX ADMIN — SODIUM CHLORIDE 2000 MILLILITER(S): 9 INJECTION INTRAMUSCULAR; INTRAVENOUS; SUBCUTANEOUS at 19:47

## 2022-04-13 RX ADMIN — SODIUM CHLORIDE 100 MILLILITER(S): 9 INJECTION, SOLUTION INTRAVENOUS at 20:17

## 2022-04-13 RX ADMIN — SODIUM CHLORIDE 1000 MILLILITER(S): 9 INJECTION, SOLUTION INTRAVENOUS at 16:05

## 2022-04-13 RX ADMIN — SODIUM CHLORIDE 1000 MILLILITER(S): 9 INJECTION INTRAMUSCULAR; INTRAVENOUS; SUBCUTANEOUS at 19:47

## 2022-04-13 RX ADMIN — CEFEPIME 100 MILLIGRAM(S): 1 INJECTION, POWDER, FOR SOLUTION INTRAMUSCULAR; INTRAVENOUS at 19:40

## 2022-04-13 RX ADMIN — SODIUM CHLORIDE 1000 MILLILITER(S): 9 INJECTION, SOLUTION INTRAVENOUS at 16:04

## 2022-04-13 RX ADMIN — Medication 250 MILLIGRAM(S): at 21:17

## 2022-04-13 RX ADMIN — SODIUM CHLORIDE 1000 MILLILITER(S): 9 INJECTION INTRAMUSCULAR; INTRAVENOUS; SUBCUTANEOUS at 16:05

## 2022-04-13 NOTE — ED PROVIDER NOTE - ATTENDING CONTRIBUTION TO CARE
87-year-old male past medical history DM, dementia, recent right hip fracture, bedbound presented from home for evaluation of decreased p.o. intake  and elevated glucose.  Patient is unable to provide any history due to underlying medical problems.  Review of prior records shows that he was admitted to the hospital end of March for hip fracture and hypoglycemia. Elderly male appears chronically ill, cachectic resting on the stretcher in no acute distress, seems to respond to internal stimuli, PERRL, dry oral mucosa, no acute respiratory distress, equal air entry, lungs clear to auscultation, abdomen soft nontender nondistended, multiple healing scratches on left lower extremity, right leg is flexed at the knee/hip, he appears confused, does not follow directions or respond to questions.  Plan: Labs including UA, IV fluids, plan to admit.  The patient arrived in ED with an indwelling Rothman catheter, according to BELKIS Robison ,who spoke with the patient's daughter,, she placed a Rothman yesterday (daughter states she is a nurse).

## 2022-04-13 NOTE — ED PROVIDER NOTE - NS ED ATTENDING STATEMENT MOD
This was a shared visit with the FRANCISCA. I reviewed and verified the documentation and independently performed the documented:

## 2022-04-13 NOTE — ED PROVIDER NOTE - OBJECTIVE STATEMENT
88 y/o male with a PMH of uncontrolled DM, prostate ca in remission, and late stage alzheimer's presents to the ED for evaluation of failure to thrive. pt is a poor historian, and [place]istanian speaking. I spoke with pt daughter Isabelle who reports pt lives home and has not been eating or drinking since he left the hospital. pt daughter reports she tried to speak with hospice without success. pt daughter reports pt was recently admitted 03/25-03/29 for hypoglycemia and pathological hip fracture. pt reports pt is at baseline, and baseline is confused. pt daughter reports she placed a flores in him at home yesterday and she is a nurse.

## 2022-04-13 NOTE — ED PROVIDER NOTE - PROGRESS NOTE DETAILS
EP: Case endorsed to Dr. Romero to follow-up UA and admit. FF: spoke with icu fellow dr. jacome accepts pt it ICU under dr. levy. FF: spoke with icu fellow dr. jacome accepts pt it ICU under dr. levy. spoke with ICU resident she accepts admission Pt s/o to me from Dr. Ordaz, waiting for UA.  labs reviewed, wbc 16, bun/creat 62/2.4 was 21/0.9 77296, K+ 4.6 on vbg. AG 34, , ph 7.27, beta hydroxy-butyrate 8.5.  Pt given ivf, iv abx, started on insulin drip.  case d/w ICU, pt accepted for ICU admission.

## 2022-04-13 NOTE — H&P ADULT - HISTORY OF PRESENT ILLNESS
The patient is nonverbal, AAO*0, history mainly obtained from the charts.  The patient is nonverbal, AAO*0, history mainly obtained from the charts.   Tried calling Isabelle: [phone] no answer, left a voice mail, will re-attempt, family may be interested in GOC discussion, DNR/DNI status.     88 y/o male with a PMH of uncontrolled DM, prostate ca in remission, and late stage alzheimer's presents to the ED for evaluation of failure to thrive. pt is a poor historian, and [place] speaking. I spoke with pt daughter Isabelle who reports pt lives home and has not been eating or drinking since he left the hospital in march 2022 ( few weeks ago). She reports that the pt was recently admitted 03/25-03/29 for hypoglycemia and pathological hip fracture. she reports pt is at baseline, and baseline is confused. pt daughter reports she placed a flores in him at home yesterday and she is a nurse.

## 2022-04-13 NOTE — H&P ADULT - NSHPPHYSICALEXAM_GEN_ALL_CORE
General  Heart:  Lung:   Abdomen:   LE: General:   Heart:  Lung:   Abdomen:   LE: General: AAO*0  Heart: Tachycardic, regular rhythm  Lung: Poor inspiratory efforts  Abdomen: +BS, soft, nontender  LE: No ABRIL

## 2022-04-13 NOTE — H&P ADULT - NSHPLABSRESULTS_GEN_ALL_CORE
WBC Count: 16.84 K/uL   RBC Count: 4.47 M/uL   Hemoglobin: 11.8 g/dL   Hematocrit: 40.7 %   Mean Cell Volume: 91.1 fL   Mean Cell Hemoglobin: 26.4 pg   Mean Cell Hemoglobin Conc: 29.0 g/dL   Red Cell Distrib Width: 19.6 %   Platelet Count - Automated: 409 K/uL   Auto Neutrophil #: 14.75 K/uL   Auto Lymphocyte #: 1.02 K/uL   Auto Monocyte #: 0.89 K/uL   Auto Eosinophil #: 0.00 K/uL   Auto Basophil #: 0.08 K/uL   Auto Neutrophil %: 87.5: Differential percentages must be correlated with absolute numbers for   clinical significance. %   Auto Lymphocyte %: 6.1 %   Auto Monocyte %: 5.3 %   Auto Eosinophil %: 0.0 %   Auto Basophil %: 0.5 %   Auto Immature Granulocyte %: 0.6: (Includes meta, myelo and promyelocytes) %   Nucleated RBC: 0 /100 WBCs     Sodium, Serum: 153 mmol/L   Potassium, Serum: 5.6: Slighty Hemolyzed use with Caution mmol/L   Chloride, Serum: 107 mmol/L   Carbon Dioxide, Serum: 12 mmol/L   Anion Gap, Serum: 34 mmol/L   Blood Urea Nitrogen, Serum: 62: Critical value: mg/dL   Creatinine, Serum: 2.4 mg/dL   Glucose, Serum: 554: TYPE:(C=Critical, N=Notification, A=Abnormal) C   TESTS: _GLU, BUN   DATE/TIME CALLED: _04/13/2022 15:44:59 EDT   CALLED TO: _DR. GEORGE   READ BACK (2 Patient Identifiers)(Y/N): _Y   READ BACK VALUES (Y/N): _Y   CALLED BY: _ML mg/dL   Calcium, Total Serum: 8.6 mg/dL   Protein Total, Serum: 7.3 g/dL   Albumin, Serum: 2.8 g/dL   Bilirubin Total, Serum: 0.9 mg/dL   Alkaline Phosphatase, Serum: 155 U/L   Aspartate Aminotransferase (AST/SGOT): 12: Hemolyzed. Interpret with caution U/L   Alanine Aminotransferase (ALT/SGPT): 16 U/L     Osmolality, Serum: 380 mos/kg   Beta Hydroxy-Butyrate: 8.5 mmoL/L   Blood Gas Venous - Lactate: 4.00 mmol/L    < from: CT Abdomen and Pelvis No Cont (04.13.22 @ 16:47) >    IMPRESSION:  1.  Technically limited exam due to lack of intravenous contrast and   patient's arms within the field of view with resulting streak artifact.  2.  Decompressed urinary bladder with intraluminal air likely secondary   to recent catheterization.  3.  No acute pathology identified on this unenhanced exam.  4.  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    < end of copied text >

## 2022-04-13 NOTE — ED PROVIDER NOTE - PHYSICAL EXAMINATION
Physical Exam    Vital Signs: I have reviewed the initial vital signs.  Constitutional: pt is cachetic, appears stated age, no acute distress  Eyes: Conjunctiva pink, Sclera clear  Cardiovascular: S1 and S2, regular rate, regular rhythm, well-perfused extremities, radial pulses equal and 2+ b/l.   Respiratory: unlabored respiratory effort, clear to auscultation bilaterally no wheezing, rales and rhonchi. pt is speaking full sentences. no accessory muscle use.   Gastrointestinal: soft, non-tender, nondistended abdomen, no pulsatile mass, normal bowl sounds, no rebound, no guarding  Musculoskeletal: supple neck, no lower extremity edema, no calf tenderness, (+) pt is contracted  Integumentary: warm, dry, no rash  Neurologic: pt is awake.   Psychiatric: appropriate mood, appropriate affect

## 2022-04-13 NOTE — ED ADULT TRIAGE NOTE - CHIEF COMPLAINT QUOTE
pt biba for elevated glucose and decreased po intake. unable to obtain sat or temp in triage patient combative

## 2022-04-13 NOTE — H&P ADULT - ASSESSMENT
Impression:     #DKA  #Pure HAGMA ( Delta-Dleta gap =1.83) secondary to DKA, Lactic acidosis and a component of uremia  #MANDA  #Hypernatremia ( Serum Sodium: 153, corrected: 150-160)  #HO of Right femoral neck fracture ( Comminuted and nonunited)    PLAN:    CNS: Avoid Sedatives.     HEENT: Oral care, HOB at 45, speech and swallow evaluation.     PULMONARY: HOB at 45 and aspiration precautions    CARDIOVASCULAR: Continue with 0.45% Normal Saline ( Hypernatremia and Blood sugar still >250)    GI: GI prophylaxis,  Feeding, nutrition consult, GI consult    RENAL: Strict Is and Os. Continue with 0.45% Normal Saline. Hypernatremia, add free water 200mL q6hrs, avoid overcorrection. ( Serum Sodium: 153, corrected: 150-160)    INFECTIOUS DISEASE: Leukocytosis ( possible dehydrated, no fevers). Follow up UA, urine and blood cultures. Chest xray: no pneumonia.     HEMATOLOGICAL:  DVT prophylaxis.     ENDOCRINE: Insulin gtt for now. Consider Endocrinology consult.     MUSCULOSKELETAL: Pt/rehab         Impression:     #DKA  #Pure HAGMA ( Delta-Dleta gap =1.83) secondary to DKA, Lactic acidosis and a component of uremia  #MANDA  #Hypernatremia ( Serum Sodium: 153, corrected: 150-160)  #HO of Right femoral neck fracture ( Comminuted and nonunited)    PLAN:    CNS: Avoid Sedatives.     HEENT: Oral care, HOB at 45, speech and swallow evaluation.     PULMONARY: HOB at 45 and aspiration precautions    CARDIOVASCULAR: Continue with 0.45% Normal Saline ( Hypernatremia and Blood sugar still >250)    GI: GI prophylaxis, Bowel regimen. Feeding.    RENAL: Strict Is and Os. Continue with 0.45% Normal Saline. Hypernatremia, add free water 200mL q6hrs, avoid overcorrection. ( Serum Sodium: 153, corrected: 150-160). No signs of Hydronephrosis or obstruction on CT scan AP w/o contrast.     INFECTIOUS DISEASE: Leukocytosis ( possible dehydrated, no fevers). Follow up UA, urine and blood cultures. Chest xray: no pneumonia.     HEMATOLOGICAL:  DVT prophylaxis.     ENDOCRINE: Insulin gtt for now. Consider Endocrinology consult.     MUSCULOSKELETAL: Pt/rehab         Impression:     #DKA  #Pure HAGMA ( Delta-Dleta gap =1.83) secondary to DKA, Lactic acidosis and a component of uremia  #MANDA  #Hypernatremia ( Serum Sodium: 153, corrected: 150-160)  #HO of Right femoral neck fracture ( Comminuted and nonunited)  #HO of Alzhiemer's disease    PLAN:    CNS: Avoid Sedatives.     HEENT: Oral care, HOB at 45, speech and swallow evaluation.     PULMONARY: HOB at 45 and aspiration precautions    CARDIOVASCULAR: Continue with 0.45% Normal Saline ( Hypernatremia and Blood sugar still >250)    GI: GI prophylaxis, Bowel regimen. Feeding.    RENAL: Strict Is and Os. Continue with 0.45% Normal Saline. Hypernatremia, add free water 200mL q6hrs, avoid overcorrection. ( Serum Sodium: 153, corrected: 150-160). No signs of Hydronephrosis or obstruction on CT scan AP w/o contrast.     INFECTIOUS DISEASE: Leukocytosis ( possible dehydrated, no fevers). Follow up UA, urine and blood cultures. Chest xray: no pneumonia. s/p vancomycin and cefepime.     HEMATOLOGICAL:  DVT prophylaxis.     ENDOCRINE: Insulin gtt for now. Consider Endocrinology consult.     MUSCULOSKELETAL: Pt/rehab         Impression:     #DKA  #Pure HAGMA ( Delta-Dleta gap =1.83) secondary to DKA, Lactic acidosis and a component of uremia  #MANDA  #Hypernatremia ( Serum Sodium: 153, corrected: 150-160)  #HO of Right femoral neck fracture ( Comminuted and nonunited)  #HO of Alzhiemer's disease    PLAN:    CNS: Avoid Sedatives.     HEENT: Oral care, HOB at 45, speech and swallow evaluation.     PULMONARY: HOB at 45 and aspiration precautions    CARDIOVASCULAR: Continue with 0.45% Normal Saline ( Hypernatremia and Blood sugar still >250)    GI: GI prophylaxis, Bowel regimen. Feeding.    RENAL: Strict Is and Os. Continue with 0.45% Normal Saline. Hypernatremia, add free water 200mL q6hrs, avoid overcorrection. ( Serum Sodium: 153, corrected: 150-160). No signs of Hydronephrosis or obstruction on CT scan AP w/o contrast.     INFECTIOUS DISEASE: Leukocytosis ( possible dehydrated, no fevers). Follow up UA, urine and blood cultures. Chest xray: no pneumonia. s/p vancomycin and cefepime.     HEMATOLOGICAL:  DVT prophylaxis.     ENDOCRINE: Insulin gtt for now. Consider Endocrinology consult.     Tried calling Isabelle: [phone] no answer, left a voice mail, will re-attempt, family may be interested in GOC discussion, DNR/DNI status.

## 2022-04-13 NOTE — ED PROVIDER NOTE - CARE PLAN
Principal Discharge DX:	Adult failure to thrive  Secondary Diagnosis:	Hyperosmolar hyperglycemic state (HHS)   1 Principal Discharge DX:	DKA (diabetic ketoacidosis)  Secondary Diagnosis:	Hyperosmolar hyperglycemic state (HHS)

## 2022-04-14 LAB
A1C WITH ESTIMATED AVERAGE GLUCOSE RESULT: 9.8 % — HIGH (ref 4–5.6)
ANION GAP SERPL CALC-SCNC: 15 MMOL/L — HIGH (ref 7–14)
ANION GAP SERPL CALC-SCNC: 16 MMOL/L — HIGH (ref 7–14)
ANION GAP SERPL CALC-SCNC: 16 MMOL/L — HIGH (ref 7–14)
ANION GAP SERPL CALC-SCNC: 17 MMOL/L — HIGH (ref 7–14)
BUN SERPL-MCNC: 38 MG/DL — HIGH (ref 10–20)
BUN SERPL-MCNC: 52 MG/DL — HIGH (ref 10–20)
BUN SERPL-MCNC: 55 MG/DL — HIGH (ref 10–20)
BUN SERPL-MCNC: 99 MG/DL — CRITICAL HIGH (ref 10–20)
CALCIUM SERPL-MCNC: 7.8 MG/DL — LOW (ref 8.5–10.1)
CALCIUM SERPL-MCNC: 7.9 MG/DL — LOW (ref 8.5–10.1)
CALCIUM SERPL-MCNC: 7.9 MG/DL — LOW (ref 8.5–10.1)
CALCIUM SERPL-MCNC: 8.1 MG/DL — LOW (ref 8.5–10.1)
CHLORIDE SERPL-SCNC: 118 MMOL/L — HIGH (ref 98–110)
CHLORIDE SERPL-SCNC: 120 MMOL/L — HIGH (ref 98–110)
CHLORIDE SERPL-SCNC: 120 MMOL/L — HIGH (ref 98–110)
CHLORIDE SERPL-SCNC: 98 MMOL/L — SIGNIFICANT CHANGE UP (ref 98–110)
CK SERPL-CCNC: 60 U/L — SIGNIFICANT CHANGE UP (ref 0–225)
CO2 SERPL-SCNC: 17 MMOL/L — SIGNIFICANT CHANGE UP (ref 17–32)
CO2 SERPL-SCNC: 18 MMOL/L — SIGNIFICANT CHANGE UP (ref 17–32)
CO2 SERPL-SCNC: 20 MMOL/L — SIGNIFICANT CHANGE UP (ref 17–32)
CO2 SERPL-SCNC: 22 MMOL/L — SIGNIFICANT CHANGE UP (ref 17–32)
CREAT SERPL-MCNC: 1.4 MG/DL — SIGNIFICANT CHANGE UP (ref 0.7–1.5)
CREAT SERPL-MCNC: 1.8 MG/DL — HIGH (ref 0.7–1.5)
CREAT SERPL-MCNC: 1.9 MG/DL — HIGH (ref 0.7–1.5)
CREAT SERPL-MCNC: 2.3 MG/DL — HIGH (ref 0.7–1.5)
CULTURE RESULTS: SIGNIFICANT CHANGE UP
EGFR: 27 ML/MIN/1.73M2 — LOW
EGFR: 34 ML/MIN/1.73M2 — LOW
EGFR: 36 ML/MIN/1.73M2 — LOW
EGFR: 49 ML/MIN/1.73M2 — LOW
ESTIMATED AVERAGE GLUCOSE: 235 MG/DL — HIGH (ref 68–114)
GLUCOSE BLDC GLUCOMTR-MCNC: 106 MG/DL — HIGH (ref 70–99)
GLUCOSE BLDC GLUCOMTR-MCNC: 125 MG/DL — HIGH (ref 70–99)
GLUCOSE BLDC GLUCOMTR-MCNC: 125 MG/DL — HIGH (ref 70–99)
GLUCOSE BLDC GLUCOMTR-MCNC: 135 MG/DL — HIGH (ref 70–99)
GLUCOSE BLDC GLUCOMTR-MCNC: 175 MG/DL — HIGH (ref 70–99)
GLUCOSE BLDC GLUCOMTR-MCNC: 214 MG/DL — HIGH (ref 70–99)
GLUCOSE BLDC GLUCOMTR-MCNC: 231 MG/DL — HIGH (ref 70–99)
GLUCOSE BLDC GLUCOMTR-MCNC: 265 MG/DL — HIGH (ref 70–99)
GLUCOSE BLDC GLUCOMTR-MCNC: 279 MG/DL — HIGH (ref 70–99)
GLUCOSE BLDC GLUCOMTR-MCNC: 281 MG/DL — HIGH (ref 70–99)
GLUCOSE BLDC GLUCOMTR-MCNC: 287 MG/DL — HIGH (ref 70–99)
GLUCOSE BLDC GLUCOMTR-MCNC: 304 MG/DL — HIGH (ref 70–99)
GLUCOSE BLDC GLUCOMTR-MCNC: 408 MG/DL — HIGH (ref 70–99)
GLUCOSE BLDC GLUCOMTR-MCNC: 62 MG/DL — LOW (ref 70–99)
GLUCOSE SERPL-MCNC: 132 MG/DL — HIGH (ref 70–99)
GLUCOSE SERPL-MCNC: 275 MG/DL — HIGH (ref 70–99)
GLUCOSE SERPL-MCNC: 295 MG/DL — HIGH (ref 70–99)
GLUCOSE SERPL-MCNC: 88 MG/DL — SIGNIFICANT CHANGE UP (ref 70–99)
HCT VFR BLD CALC: 32.9 % — LOW (ref 42–52)
HGB BLD-MCNC: 9.9 G/DL — LOW (ref 14–18)
LACTATE SERPL-SCNC: 3.1 MMOL/L — HIGH (ref 0.7–2)
LACTATE SERPL-SCNC: 5.1 MMOL/L — CRITICAL HIGH (ref 0.7–2)
MAGNESIUM SERPL-MCNC: 1.7 MG/DL — LOW (ref 1.8–2.4)
MCHC RBC-ENTMCNC: 26.5 PG — LOW (ref 27–31)
MCHC RBC-ENTMCNC: 30.1 G/DL — LOW (ref 32–37)
MCV RBC AUTO: 88.2 FL — SIGNIFICANT CHANGE UP (ref 80–94)
NRBC # BLD: 0 /100 WBCS — SIGNIFICANT CHANGE UP (ref 0–0)
PLATELET # BLD AUTO: 272 K/UL — SIGNIFICANT CHANGE UP (ref 130–400)
POTASSIUM SERPL-MCNC: 3.9 MMOL/L — SIGNIFICANT CHANGE UP (ref 3.5–5)
POTASSIUM SERPL-MCNC: 4 MMOL/L — SIGNIFICANT CHANGE UP (ref 3.5–5)
POTASSIUM SERPL-MCNC: 4 MMOL/L — SIGNIFICANT CHANGE UP (ref 3.5–5)
POTASSIUM SERPL-MCNC: 4.1 MMOL/L — SIGNIFICANT CHANGE UP (ref 3.5–5)
POTASSIUM SERPL-SCNC: 3.9 MMOL/L — SIGNIFICANT CHANGE UP (ref 3.5–5)
POTASSIUM SERPL-SCNC: 4 MMOL/L — SIGNIFICANT CHANGE UP (ref 3.5–5)
POTASSIUM SERPL-SCNC: 4 MMOL/L — SIGNIFICANT CHANGE UP (ref 3.5–5)
POTASSIUM SERPL-SCNC: 4.1 MMOL/L — SIGNIFICANT CHANGE UP (ref 3.5–5)
RBC # BLD: 3.73 M/UL — LOW (ref 4.7–6.1)
RBC # FLD: 19.1 % — HIGH (ref 11.5–14.5)
SODIUM SERPL-SCNC: 135 MMOL/L — SIGNIFICANT CHANGE UP (ref 135–146)
SODIUM SERPL-SCNC: 152 MMOL/L — HIGH (ref 135–146)
SODIUM SERPL-SCNC: 154 MMOL/L — HIGH (ref 135–146)
SODIUM SERPL-SCNC: 156 MMOL/L — HIGH (ref 135–146)
SPECIMEN SOURCE: SIGNIFICANT CHANGE UP
TROPONIN T SERPL-MCNC: 0.08 NG/ML — CRITICAL HIGH
WBC # BLD: 11.39 K/UL — HIGH (ref 4.8–10.8)
WBC # FLD AUTO: 11.39 K/UL — HIGH (ref 4.8–10.8)

## 2022-04-14 PROCEDURE — 93925 LOWER EXTREMITY STUDY: CPT | Mod: 26

## 2022-04-14 PROCEDURE — 71045 X-RAY EXAM CHEST 1 VIEW: CPT | Mod: 26

## 2022-04-14 PROCEDURE — 99223 1ST HOSP IP/OBS HIGH 75: CPT

## 2022-04-14 PROCEDURE — 93970 EXTREMITY STUDY: CPT | Mod: 26

## 2022-04-14 PROCEDURE — 99221 1ST HOSP IP/OBS SF/LOW 40: CPT

## 2022-04-14 RX ORDER — GLUCAGON INJECTION, SOLUTION 0.5 MG/.1ML
1 INJECTION, SOLUTION SUBCUTANEOUS ONCE
Refills: 0 | Status: DISCONTINUED | OUTPATIENT
Start: 2022-04-14 | End: 2022-04-21

## 2022-04-14 RX ORDER — CEFEPIME 1 G/1
1000 INJECTION, POWDER, FOR SOLUTION INTRAMUSCULAR; INTRAVENOUS EVERY 12 HOURS
Refills: 0 | Status: DISCONTINUED | OUTPATIENT
Start: 2022-04-14 | End: 2022-04-14

## 2022-04-14 RX ORDER — METRONIDAZOLE 500 MG
500 TABLET ORAL ONCE
Refills: 0 | Status: COMPLETED | OUTPATIENT
Start: 2022-04-14 | End: 2022-04-14

## 2022-04-14 RX ORDER — DEXTROSE MONOHYDRATE, SODIUM CHLORIDE, AND POTASSIUM CHLORIDE 50; .745; 4.5 G/1000ML; G/1000ML; G/1000ML
1000 INJECTION, SOLUTION INTRAVENOUS
Refills: 0 | Status: DISCONTINUED | OUTPATIENT
Start: 2022-04-14 | End: 2022-04-14

## 2022-04-14 RX ORDER — SODIUM CHLORIDE 9 MG/ML
1000 INJECTION, SOLUTION INTRAVENOUS
Refills: 0 | Status: DISCONTINUED | OUTPATIENT
Start: 2022-04-14 | End: 2022-04-21

## 2022-04-14 RX ORDER — PIPERACILLIN AND TAZOBACTAM 4; .5 G/20ML; G/20ML
3.38 INJECTION, POWDER, LYOPHILIZED, FOR SOLUTION INTRAVENOUS EVERY 12 HOURS
Refills: 0 | Status: DISCONTINUED | OUTPATIENT
Start: 2022-04-14 | End: 2022-04-15

## 2022-04-14 RX ORDER — DEXTROSE MONOHYDRATE, SODIUM CHLORIDE, AND POTASSIUM CHLORIDE 50; .745; 4.5 G/1000ML; G/1000ML; G/1000ML
1000 INJECTION, SOLUTION INTRAVENOUS
Refills: 0 | Status: DISCONTINUED | OUTPATIENT
Start: 2022-04-14 | End: 2022-04-15

## 2022-04-14 RX ORDER — INSULIN HUMAN 100 [IU]/ML
3 INJECTION, SOLUTION SUBCUTANEOUS
Qty: 100 | Refills: 0 | Status: DISCONTINUED | OUTPATIENT
Start: 2022-04-14 | End: 2022-04-15

## 2022-04-14 RX ORDER — SODIUM CHLORIDE 9 MG/ML
1000 INJECTION, SOLUTION INTRAVENOUS ONCE
Refills: 0 | Status: COMPLETED | OUTPATIENT
Start: 2022-04-14 | End: 2022-04-14

## 2022-04-14 RX ORDER — INSULIN LISPRO 100/ML
4 VIAL (ML) SUBCUTANEOUS
Refills: 0 | Status: DISCONTINUED | OUTPATIENT
Start: 2022-04-14 | End: 2022-04-15

## 2022-04-14 RX ORDER — INSULIN GLARGINE 100 [IU]/ML
16 INJECTION, SOLUTION SUBCUTANEOUS AT BEDTIME
Refills: 0 | Status: DISCONTINUED | OUTPATIENT
Start: 2022-04-14 | End: 2022-04-15

## 2022-04-14 RX ORDER — CEFEPIME 1 G/1
1000 INJECTION, POWDER, FOR SOLUTION INTRAMUSCULAR; INTRAVENOUS ONCE
Refills: 0 | Status: COMPLETED | OUTPATIENT
Start: 2022-04-14 | End: 2022-04-14

## 2022-04-14 RX ORDER — METRONIDAZOLE 500 MG
TABLET ORAL
Refills: 0 | Status: DISCONTINUED | OUTPATIENT
Start: 2022-04-14 | End: 2022-04-14

## 2022-04-14 RX ORDER — SODIUM CHLORIDE 9 MG/ML
1000 INJECTION, SOLUTION INTRAVENOUS
Refills: 0 | Status: DISCONTINUED | OUTPATIENT
Start: 2022-04-14 | End: 2022-04-14

## 2022-04-14 RX ORDER — INSULIN LISPRO 100/ML
VIAL (ML) SUBCUTANEOUS
Refills: 0 | Status: DISCONTINUED | OUTPATIENT
Start: 2022-04-14 | End: 2022-04-15

## 2022-04-14 RX ORDER — METRONIDAZOLE 500 MG
500 TABLET ORAL EVERY 8 HOURS
Refills: 0 | Status: DISCONTINUED | OUTPATIENT
Start: 2022-04-14 | End: 2022-04-14

## 2022-04-14 RX ORDER — DEXTROSE 50 % IN WATER 50 %
50 SYRINGE (ML) INTRAVENOUS ONCE
Refills: 0 | Status: COMPLETED | OUTPATIENT
Start: 2022-04-14 | End: 2022-04-14

## 2022-04-14 RX ORDER — DEXTROSE 50 % IN WATER 50 %
12.5 SYRINGE (ML) INTRAVENOUS ONCE
Refills: 0 | Status: DISCONTINUED | OUTPATIENT
Start: 2022-04-14 | End: 2022-04-21

## 2022-04-14 RX ORDER — MAGNESIUM SULFATE 500 MG/ML
2 VIAL (ML) INJECTION ONCE
Refills: 0 | Status: COMPLETED | OUTPATIENT
Start: 2022-04-14 | End: 2022-04-14

## 2022-04-14 RX ORDER — CEFEPIME 1 G/1
INJECTION, POWDER, FOR SOLUTION INTRAMUSCULAR; INTRAVENOUS
Refills: 0 | Status: DISCONTINUED | OUTPATIENT
Start: 2022-04-14 | End: 2022-04-14

## 2022-04-14 RX ORDER — DEXTROSE 50 % IN WATER 50 %
15 SYRINGE (ML) INTRAVENOUS ONCE
Refills: 0 | Status: DISCONTINUED | OUTPATIENT
Start: 2022-04-14 | End: 2022-04-21

## 2022-04-14 RX ORDER — PANTOPRAZOLE SODIUM 20 MG/1
40 TABLET, DELAYED RELEASE ORAL
Refills: 0 | Status: DISCONTINUED | OUTPATIENT
Start: 2022-04-14 | End: 2022-04-21

## 2022-04-14 RX ORDER — DEXTROSE 50 % IN WATER 50 %
25 SYRINGE (ML) INTRAVENOUS ONCE
Refills: 0 | Status: DISCONTINUED | OUTPATIENT
Start: 2022-04-14 | End: 2022-04-21

## 2022-04-14 RX ORDER — PIPERACILLIN AND TAZOBACTAM 4; .5 G/20ML; G/20ML
3.38 INJECTION, POWDER, LYOPHILIZED, FOR SOLUTION INTRAVENOUS ONCE
Refills: 0 | Status: COMPLETED | OUTPATIENT
Start: 2022-04-14 | End: 2022-04-14

## 2022-04-14 RX ORDER — MAGNESIUM SULFATE 500 MG/ML
1 VIAL (ML) INJECTION ONCE
Refills: 0 | Status: DISCONTINUED | OUTPATIENT
Start: 2022-04-14 | End: 2022-04-14

## 2022-04-14 RX ADMIN — Medication 100 MILLIGRAM(S): at 11:38

## 2022-04-14 RX ADMIN — SODIUM CHLORIDE 1000 MILLILITER(S): 9 INJECTION, SOLUTION INTRAVENOUS at 09:00

## 2022-04-14 RX ADMIN — Medication 25 GRAM(S): at 07:48

## 2022-04-14 RX ADMIN — CEFEPIME 100 MILLIGRAM(S): 1 INJECTION, POWDER, FOR SOLUTION INTRAMUSCULAR; INTRAVENOUS at 10:02

## 2022-04-14 RX ADMIN — Medication 50 MILLILITER(S): at 22:03

## 2022-04-14 RX ADMIN — SODIUM CHLORIDE 1000 MILLILITER(S): 9 INJECTION, SOLUTION INTRAVENOUS at 02:00

## 2022-04-14 RX ADMIN — DEXTROSE MONOHYDRATE, SODIUM CHLORIDE, AND POTASSIUM CHLORIDE 75 MILLILITER(S): 50; .745; 4.5 INJECTION, SOLUTION INTRAVENOUS at 01:16

## 2022-04-14 RX ADMIN — DEXTROSE MONOHYDRATE, SODIUM CHLORIDE, AND POTASSIUM CHLORIDE 75 MILLILITER(S): 50; .745; 4.5 INJECTION, SOLUTION INTRAVENOUS at 04:42

## 2022-04-14 RX ADMIN — CHLORHEXIDINE GLUCONATE 1 APPLICATION(S): 213 SOLUTION TOPICAL at 10:02

## 2022-04-14 RX ADMIN — DONEPEZIL HYDROCHLORIDE 5 MILLIGRAM(S): 10 TABLET, FILM COATED ORAL at 22:37

## 2022-04-14 RX ADMIN — PIPERACILLIN AND TAZOBACTAM 200 GRAM(S): 4; .5 INJECTION, POWDER, LYOPHILIZED, FOR SOLUTION INTRAVENOUS at 18:54

## 2022-04-14 RX ADMIN — HEPARIN SODIUM 5000 UNIT(S): 5000 INJECTION INTRAVENOUS; SUBCUTANEOUS at 05:55

## 2022-04-14 RX ADMIN — HEPARIN SODIUM 5000 UNIT(S): 5000 INJECTION INTRAVENOUS; SUBCUTANEOUS at 18:28

## 2022-04-14 NOTE — CONSULT NOTE ADULT - SUBJECTIVE AND OBJECTIVE BOX
GERMANIA PAGAN  87y, Male  Allergy: No Known Allergies      CHIEF COMPLAINT:       LOS  1d    HPI  HPI:  The patient is nonverbal, AAO*0, history mainly obtained from the charts.   Tried calling Isabelle: [phone] no answer, left a voice mail, will re-attempt, family may be interested in GOC discussion, DNR/DNI status.     88 y/o male with a PMH of uncontrolled DM, prostate ca in remission, and late stage alzheimer's presents to the ED for evaluation of failure to thrive. pt is a poor historian, and Austrian speaking. I spoke with pt daughter Isabelle who reports pt lives home and has not been eating or drinking since he left the hospital in 2022 ( few weeks ago). She reports that the pt was recently admitted - for hypoglycemia and pathological hip fracture. she reports pt is at baseline, and baseline is confused. pt daughter reports she placed a flores in him at home yesterday and she is a nurse.  (2022 20:30)      INFECTIOUS DISEASE HISTORY:  ID consulted for LE ulcer    PMH  PAST MEDICAL & SURGICAL HISTORY:  Prostate cancer    Dementia    Diabetes    No significant past surgical history        FAMILY HISTORY      SOCIAL HISTORY  Social History:  No smoking  No drug use  No ETOH use  Lives with daughter (25 Mar 2022 21:25)        ROS  ***    VITALS:  T(F): 98, Max: 99.7 (22 @ 11:39)  HR: 86  BP: 87/53  RR: 26Vital Signs Last 24 Hrs  T(C): 36.7 (2022 08:00), Max: 37.6 (2022 11:39)  T(F): 98 (2022 08:00), Max: 99.7 (2022 11:39)  HR: 86 (2022 08:00) (85 - 124)  BP: 87/53 (2022 08:00) (79/47 - 125/69)  BP(mean): 65 (2022 08:00) (58 - 76)  RR: 26 (2022 08:00) (18 - 29)  SpO2: 100% (2022 08:00) (93% - 100%)    PHYSICAL EXAM:  ***    TESTS & MEASUREMENTS:                        9.9    11.39 )-----------( 272      ( 2022 04:43 )             32.9     04-14    156<H>  |  120<H>  |  52<H>  ----------------------------<  132<H>  3.9   |  20  |  1.8<H>    Ca    7.9<L>      2022 04:43  Mg     1.7     -    TPro  7.3  /  Alb  2.8<L>  /  TBili  0.9  /  DBili  x   /  AST  12  /  ALT  16  /  AlkPhos  155<H>  -      LIVER FUNCTIONS - ( 2022 13:14 )  Alb: 2.8 g/dL / Pro: 7.3 g/dL / ALK PHOS: 155 U/L / ALT: 16 U/L / AST: 12 U/L / GGT: x           Urinalysis Basic - ( 2022 17:55 )    Color: Yellow / Appearance: Clear / S.030 / pH: x  Gluc: x / Ketone: Large  / Bili: Small / Urobili: <2 mg/dL   Blood: x / Protein: 100 mg/dL / Nitrite: Negative   Leuk Esterase: Negative / RBC: >50 /HPF / WBC 10-25 /HPF   Sq Epi: x / Non Sq Epi: Few / Bacteria: Moderate        Culture - Urine (collected 10-12-21 @ 19:51)  Source: Clean Catch Clean Catch (Midstream)  Final Report (10-15-21 @ 21:48):    10,000 - 49,000 CFU/mL Enterococcus faecalis  Organism: Enterococcus faecalis (10-15-21 @ 21:48)  Organism: Enterococcus faecalis (10-15-21 @ 21:48)      -  Ampicillin: S <=2 Predicts results to ampicillin/sulbactam, amoxacillin-clavulanate and  piperacillin-tazobactam.      -  Ciprofloxacin: I 2      -  Levofloxacin: I 4      -  Nitrofurantoin: S <=32 Should not be used to treat pyelonephritis.      -  Tetra/Doxy: R >8      -  Vancomycin: S 1      Method Type: EMILI    Culture - Urine (collected 21 @ 13:45)  Source: .Urine Clean Catch (Midstream)  Final Report (21 @ 14:41):    >=3 organisms. Probable collection contamination.        Lactate, Blood: 5.1 mmol/L (22 @ 01:49)  Lactate, Blood: 3.4 mmol/L (22 @ 22:03)  Blood Gas Venous - Lactate: 2.90 mmol/L (22 @ 19:07)  Blood Gas Venous - Lactate: 4.00 mmol/L (22 @ 13:14)      INFECTIOUS DISEASES TESTING  COVID-19 PCR: NotDetec (22 @ 18:49)  COVID-19 PCR: NotDetec (22 @ 16:34)  Rapid RVP Result: NotDetec (10-12-21 @ 20:16)  COVID-19 PCR: NotDetec (21 @ 13:37)      INFLAMMATORY MARKERS      RADIOLOGY & ADDITIONAL TESTS:  I have personally reviewed the last Chest xray  CXR  Xray Chest 1 View- PORTABLE-Urgent:   ACC: [id number] EXAM:  XR CHEST PORTABLE URGENT 1V                          PROCEDURE DATE:  2022          INTERPRETATION:  Clinical History / Reason for exam: Sepsis.    Comparison : Chest radiograph 3/25/2022.    Technique/Positioning: Singlefrontal view the chest.    Findings:    Support devices: None.    Cardiac/mediastinum/hilum: Stable cardiomediastinal silhouette    Lung parenchyma/Pleura: Within normal limits.    Skeleton/soft tissues: Stable.    Impression:    No radiographic evidence of acute cardiopulmonary disease.        --- End of Report ---            ALEIDA DESAI MD; Attending Radiologist  This document has been electronically signed. 2022  4:26PM (22 @ 14:16)      CT  CT Abdomen and Pelvis No Cont:   ACC: [id number] EXAM:  CT ABDOMEN AND PELVIS                          PROCEDURE DATE:  2022          INTERPRETATION:  CLINICAL STATEMENT: Leukocytosis    TECHNIQUE: Contiguous axial CT images were obtained from the lower chest   to the pubic symphysis without intravenous contrast.  Oral contrast was   not administered.  Reformatted images in the coronal and sagittal planes   were acquired.    COMPARISON CT: 3/25/2021    OTHER STUDIES USED FOR CORRELATION: None.      FINDINGS:  Patient's arms within the field of view result in streak artifact which   limits evaluation.    LOWER CHEST: Left basilar subsegmental atelectasis. Coronary artery   calcifications.    HEPATOBILIARY: Scattered calcified granuloma. Otherwise limited in   evaluationwithout contrast and due to streak artifact. Gallbladder is   present.    SPLEEN: Unremarkable.    PANCREAS: Unremarkable.    ADRENAL GLANDS: Unremarkable.    KIDNEYS: No hydronephrosis.    ABDOMINOPELVIC NODES: No definite lymphadenopathy.    PELVICORGANS: Urinary bladder is decompressed with Flores catheter.   Intraluminal air is likely secondary to recent catheterization.    PERITONEUM/MESENTERY/BOWEL: No bowel obstruction. No free fluid or free   air.    BONES/SOFT TISSUES: Diffuse osteopenia. 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 Patient positioning limits   evaluation of detailed bony structures. No acute fracture is seen.   Scoliotic and degenerative changes along the vertebral column.    OTHER: Moderate atherosclerosis      IMPRESSION:  1.  Technically limited exam due to lack of intravenous contrast and   patient's arms within the field of view with resulting streak artifact.  2.  Decompressed urinary bladder with intraluminal air likely secondary   to recent catheterization.  3.  No acute pathology identified on this unenhanced exam.  4.  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    --- End of Report ---            ALEIDA DESAI MD; Attending Radiologist  This document has been electronically signed. 2022  6:21PM (22 @ 16:47)      CARDIOLOGY TESTING  12 Lead ECG:   Ventricular Rate 108 BPM    Atrial Rate 108 BPM    P-R Interval 114 ms    QRS Duration 112 ms    Q-T Interval 374 ms    QTC Calculation(Bazett) 501 ms    P Axis 76 degrees    R Axis -16 degrees    T Axis 136 degrees    Diagnosis Line Sinus tachycardia  Right bundle branch block  T wave abnormality, consider lateral ischemia  Abnormal ECG    Confirmed by DEX JORGE MD ([id number]) on 2022 11:18:50 PM (22 @ 20:45)  12 Lead ECG:   Ventricular Rate 122 BPM    Atrial Rate 122 BPM    P-R Interval 126 ms    QRS Duration 116 ms    Q-T Interval 388 ms    QTC Calculation(Bazett) 552 ms    P Axis 29 degrees    R Axis 73 degrees    T Axis 118 degrees    Diagnosis Line Sinus tachycardia  Right bundle branch block  Lateral infarct , age undetermined  Abnormal ECG    Confirmed by DEX JORGE MD ([id number]) on 2022 11:18:46 PM (22 @ 14:33)      MEDICATIONS  cefepime   IVPB     cefepime   IVPB 1000 IV Intermittent once  cefepime   IVPB 1000 IV Intermittent every 12 hours  chlorhexidine 4% Liquid 1 Topical daily  dextrose 5% + sodium chloride 0.45% with potassium chloride 20 mEq/L 1000 IV Continuous <Continuous>  donepezil 5 Oral at bedtime  heparin   Injectable 5000 SubCutaneous every 12 hours  insulin regular Infusion 1 IV Continuous <Continuous>  lactated ringers Bolus 1000 IV Bolus once  pantoprazole    Tablet 40 Oral before breakfast  polyethylene glycol 3350 17 Oral daily        ANTIBIOTICS:  cefepime   IVPB      cefepime   IVPB 1000 milliGRAM(s) IV Intermittent once  cefepime   IVPB 1000 milliGRAM(s) IV Intermittent every 12 hours      ALLERGIES:  No Known Allergies         GERMANIA PAGAN  87y, Male  Allergy: No Known Allergies      CHIEF COMPLAINT:       LOS  1d    HPI  HPI:  The patient is nonverbal, AAO*0, history mainly obtained from the charts.   Tried calling Isabelle: [phone] no answer, left a voice mail, will re-attempt, family may be interested in GOC discussion, DNR/DNI status.     88 y/o male with a PMH of uncontrolled DM, prostate ca in remission, and late stage alzheimer's presents to the ED for evaluation of failure to thrive. pt is a poor historian, and Hungarian speaking. I spoke with pt daughter Isabelle who reports pt lives home and has not been eating or drinking since he left the hospital in 2022 ( few weeks ago). She reports that the pt was recently admitted - for hypoglycemia and pathological hip fracture. she reports pt is at baseline, and baseline is confused. pt daughter reports she placed a flores in him at home yesterday and she is a nurse.  (2022 20:30)      INFECTIOUS DISEASE HISTORY:  ID consulted for LE ulcer- exam with dry gangrene  cannot obtain further history from the patient secondary to altered mental status or sedation     PMH  PAST MEDICAL & SURGICAL HISTORY:  Prostate cancer    Dementia    Diabetes    No significant past surgical history        FAMILY HISTORY  non-contributory     SOCIAL HISTORY  Social History:  No smoking  No drug use  No ETOH use  Lives with daughter (25 Mar 2022 21:25)        ROS  unable to obtain history secondary to patient's mental status and/or sedation     VITALS:  T(F): 98, Max: 99.7 (22 @ 11:39)  HR: 86  BP: 87/53  RR: 26Vital Signs Last 24 Hrs  T(C): 36.7 (2022 08:00), Max: 37.6 (2022 11:39)  T(F): 98 (2022 08:00), Max: 99.7 (2022 11:39)  HR: 86 (2022 08:00) (85 - 124)  BP: 87/53 (2022 08:00) (79/47 - 125/69)  BP(mean): 65 (2022 08:00) (58 - 76)  RR: 26 (2022 08:00) (18 - 29)  SpO2: 100% (2022 08:00) (93% - 100%)    PHYSICAL EXAM:  Gen: NAD, resting in bed, confused  HEENT: Normocephalic, atraumatic  Neck: supple, no lymphadenopathy  CV: Regular rate & regular rhythm  Lungs: decreased BS at bases, no fremitus  Abdomen: Soft, BS present  Ext: Warm, well perfused, R foot with dry gangrene   Neuro: non focal, awake  Skin: no rash, no erythema  Lines: no phlebitis     TESTS & MEASUREMENTS:                        9.9    11.39 )-----------( 272      ( 2022 04:43 )             32.9     04-14    156<H>  |  120<H>  |  52<H>  ----------------------------<  132<H>  3.9   |  20  |  1.8<H>    Ca    7.9<L>      2022 04:43  Mg     1.7     -14    TPro  7.3  /  Alb  2.8<L>  /  TBili  0.9  /  DBili  x   /  AST  12  /  ALT  16  /  AlkPhos  155<H>  04-13      LIVER FUNCTIONS - ( 2022 13:14 )  Alb: 2.8 g/dL / Pro: 7.3 g/dL / ALK PHOS: 155 U/L / ALT: 16 U/L / AST: 12 U/L / GGT: x           Urinalysis Basic - ( 2022 17:55 )    Color: Yellow / Appearance: Clear / S.030 / pH: x  Gluc: x / Ketone: Large  / Bili: Small / Urobili: <2 mg/dL   Blood: x / Protein: 100 mg/dL / Nitrite: Negative   Leuk Esterase: Negative / RBC: >50 /HPF / WBC 10-25 /HPF   Sq Epi: x / Non Sq Epi: Few / Bacteria: Moderate        Culture - Urine (collected 10-12-21 @ 19:51)  Source: Clean Catch Clean Catch (Midstream)  Final Report (10-15-21 @ 21:48):    10,000 - 49,000 CFU/mL Enterococcus faecalis  Organism: Enterococcus faecalis (10-15-21 @ 21:48)  Organism: Enterococcus faecalis (10-15-21 @ 21:48)      -  Ampicillin: S <=2 Predicts results to ampicillin/sulbactam, amoxacillin-clavulanate and  piperacillin-tazobactam.      -  Ciprofloxacin: I 2      -  Levofloxacin: I 4      -  Nitrofurantoin: S <=32 Should not be used to treat pyelonephritis.      -  Tetra/Doxy: R >8      -  Vancomycin: S 1      Method Type: EMILI    Culture - Urine (collected 21 @ 13:45)  Source: .Urine Clean Catch (Midstream)  Final Report (21 @ 14:41):    >=3 organisms. Probable collection contamination.        Lactate, Blood: 5.1 mmol/L (22 @ 01:49)  Lactate, Blood: 3.4 mmol/L (22 @ 22:03)  Blood Gas Venous - Lactate: 2.90 mmol/L (22 @ 19:07)  Blood Gas Venous - Lactate: 4.00 mmol/L (22 @ 13:14)      INFECTIOUS DISEASES TESTING  COVID-19 PCR: NotDetec (22 @ 18:49)  COVID-19 PCR: NotDetec (22 @ 16:34)  Rapid RVP Result: NotDetec (10-12-21 @ 20:16)  COVID-19 PCR: NotDetec (21 @ 13:37)      INFLAMMATORY MARKERS      RADIOLOGY & ADDITIONAL TESTS:  I have personally reviewed the last Chest xray  CXR  Xray Chest 1 View- PORTABLE-Urgent:   ACC: [id number] EXAM:  XR CHEST PORTABLE URGENT 1V                          PROCEDURE DATE:  2022          INTERPRETATION:  Clinical History / Reason for exam: Sepsis.    Comparison : Chest radiograph 3/25/2022.    Technique/Positioning: Singlefrontal view the chest.    Findings:    Support devices: None.    Cardiac/mediastinum/hilum: Stable cardiomediastinal silhouette    Lung parenchyma/Pleura: Within normal limits.    Skeleton/soft tissues: Stable.    Impression:    No radiographic evidence of acute cardiopulmonary disease.        --- End of Report ---            ALEIDA DESAI MD; Attending Radiologist  This document has been electronically signed. 2022  4:26PM (22 @ 14:16)      CT  CT Abdomen and Pelvis No Cont:   ACC: [id number] EXAM:  CT ABDOMEN AND PELVIS                          PROCEDURE DATE:  2022          INTERPRETATION:  CLINICAL STATEMENT: Leukocytosis    TECHNIQUE: Contiguous axial CT images were obtained from the lower chest   to the pubic symphysis without intravenous contrast.  Oral contrast was   not administered.  Reformatted images in the coronal and sagittal planes   were acquired.    COMPARISON CT: 3/25/2021    OTHER STUDIES USED FOR CORRELATION: None.      FINDINGS:  Patient's arms within the field of view result in streak artifact which   limits evaluation.    LOWER CHEST: Left basilar subsegmental atelectasis. Coronary artery   calcifications.    HEPATOBILIARY: Scattered calcified granuloma. Otherwise limited in   evaluationwithout contrast and due to streak artifact. Gallbladder is   present.    SPLEEN: Unremarkable.    PANCREAS: Unremarkable.    ADRENAL GLANDS: Unremarkable.    KIDNEYS: No hydronephrosis.    ABDOMINOPELVIC NODES: No definite lymphadenopathy.    PELVICORGANS: Urinary bladder is decompressed with Flores catheter.   Intraluminal air is likely secondary to recent catheterization.    PERITONEUM/MESENTERY/BOWEL: No bowel obstruction. No free fluid or free   air.    BONES/SOFT TISSUES: Diffuse osteopenia. 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 Patient positioning limits   evaluation of detailed bony structures. No acute fracture is seen.   Scoliotic and degenerative changes along the vertebral column.    OTHER: Moderate atherosclerosis      IMPRESSION:  1.  Technically limited exam due to lack of intravenous contrast and   patient's arms within the field of view with resulting streak artifact.  2.  Decompressed urinary bladder with intraluminal air likely secondary   to recent catheterization.  3.  No acute pathology identified on this unenhanced exam.  4.  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    --- End of Report ---            ALEIDA DESAI MD; Attending Radiologist  This document has been electronically signed. 2022  6:21PM (22 @ 16:47)      CARDIOLOGY TESTING  12 Lead ECG:   Ventricular Rate 108 BPM    Atrial Rate 108 BPM    P-R Interval 114 ms    QRS Duration 112 ms    Q-T Interval 374 ms    QTC Calculation(Bazett) 501 ms    P Axis 76 degrees    R Axis -16 degrees    T Axis 136 degrees    Diagnosis Line Sinus tachycardia  Right bundle branch block  T wave abnormality, consider lateral ischemia  Abnormal ECG    Confirmed by DEX JORGE MD ([id number]) on 2022 11:18:50 PM (22 @ 20:45)  12 Lead ECG:   Ventricular Rate 122 BPM    Atrial Rate 122 BPM    P-R Interval 126 ms    QRS Duration 116 ms    Q-T Interval 388 ms    QTC Calculation(Bazett) 552 ms    P Axis 29 degrees    R Axis 73 degrees    T Axis 118 degrees    Diagnosis Line Sinus tachycardia  Right bundle branch block  Lateral infarct , age undetermined  Abnormal ECG    Confirmed by DEX JORGE MD ([id number]) on 2022 11:18:46 PM (22 @ 14:33)      MEDICATIONS  cefepime   IVPB     cefepime   IVPB 1000 IV Intermittent once  cefepime   IVPB 1000 IV Intermittent every 12 hours  chlorhexidine 4% Liquid 1 Topical daily  dextrose 5% + sodium chloride 0.45% with potassium chloride 20 mEq/L 1000 IV Continuous <Continuous>  donepezil 5 Oral at bedtime  heparin   Injectable 5000 SubCutaneous every 12 hours  insulin regular Infusion 1 IV Continuous <Continuous>  lactated ringers Bolus 1000 IV Bolus once  pantoprazole    Tablet 40 Oral before breakfast  polyethylene glycol 3350 17 Oral daily        ANTIBIOTICS:  cefepime   IVPB      cefepime   IVPB 1000 milliGRAM(s) IV Intermittent once  cefepime   IVPB 1000 milliGRAM(s) IV Intermittent every 12 hours      ALLERGIES:  No Known Allergies

## 2022-04-14 NOTE — CONSULT NOTE ADULT - ASSESSMENT
IMPRESSION:  DKA/HHS  Starvation Ketoacidosis  HAGMA with lactic acidosis  Alzheimer disease   MANDA  Right foot ulcer  Hypernatremia    PLAN:    CNS: Avoid CNS depressants.      HEENT: Oral care    PULMONARY:  HOB @ 45 degrees. Aspiration Precautions .     CARDIOVASCULAR: LR bolus 1 L f/b d5+ 0.45 NS @150. Repeat lactate     GI: GI prophylaxis.  Needs NGT     RENAL: Free water.  BMP Q12 Follow up lytes.  Correct as needed. repeat CK level    INFECTIOUS DISEASE: Follow up cultures. cefepime and flagyl. Nasal MRSA . ID consult    HEMATOLOGICAL:  DVT prophylaxis.    ENDOCRINE:  Follow up FS.  Insulin drip. Endocrinology     MUSCULOSKELETAL: Bedrest . b/l LE duplex. Podiatry consult    Rothman:  Yes  Lines:  Peripheral IV   Code status: Full code  Disposition: CCU    GO  Palliative care consult     Guarded prognosis

## 2022-04-14 NOTE — PROGRESS NOTE ADULT - ASSESSMENT
ASSESSMENT & PLAN  88 y/o male with a PMH of uncontrolled DM, prostate ca in remission, and late stage alzheimer's presents to the ED for evaluation of failure to thrive. pt is a poor historian, and Irish speaking.    # DKA, resolving  # HAGMA, improving  # Lactic acidosis  # ?Starvation ketoacidosis  - FS: 214, 106, 135, 125, 175, 231, 279, 408, 437, 465, 427, 483  - on admission, acidotic, BHB 8.5, UA+ketones, glucose, , AG 34, Sosm 380 (elev)  - started on insulin gtt, D5+NS (now transitioned to D5+1/2NS)  - pt not tolerating PO 2/2 mental status, will likely need NGT -- plan to discuss with family  - AG now 16, bicarb now 20  - LA 4.0->2.9->3.4->5.1, rpt at 11AM  - cont insulin gtt, D5+1/2NS w K  - a1c 9.8 (04-14-22)  - check CE x2, CK   - BMP q12h  - FS q2h  - unclear precipitating factor  - endo c/s    # Severe alzheimer disease  - pt nonverbal, AOx0  - lives at home with daughter  - poor PO intake over past several weeks  - pt obtunded this AM  - can check speech & swallow eval, but suspect pt will need NGT  - GOC    # MANDA  - Cr 1.7 on admission, now 1.8 -- baseline ~0.7-0.9  - BUN/Cr ratio >25, suspect prerenal in setting of poor PO intake  - cont IVF    # Hypernatremia  - Na 153->152->156  - cont D5+1/2NS @ 150  - if family agreeable to NGT will start free water    # R foot eschar  - s/p podiatry eval - local wound care, EZ boots, WBAT, no surgical intervention  - check LE arterial duplex  - start cefepime, flagyl for now given ?etiology of DKA  - check MRSA, procal  - f/u outpatient podiatry    PT/REHAB n/a  ACTIVITY: bedrest  DVT PPX: heparin   Injectable -- check LE duplex  GI PPX: pantoprazole    Tablet  DIET: NPO  CODE: full -- GOC discussions  Disposition: CCU  Pending: GOC, NGT?, f/u FS, f/u BMP, ID c/s, endo c/s

## 2022-04-14 NOTE — SWALLOW BEDSIDE ASSESSMENT ADULT - SLP GENERAL OBSERVATIONS
pt unable to follow commands or answer any questions. Orally defensive and combative. Malodorous smell coming from oral cavity. oral care attempted however pt biting on swab

## 2022-04-14 NOTE — PROGRESS NOTE ADULT - SUBJECTIVE AND OBJECTIVE BOX
GERMANIA PAGAN MRN#: [id number]  CODE STATUS: FULL CODE     SUBJECTIVE   Chief complaint:   Currently admitted to medicine with the primary diagnosis of ADULT FAILURE TO THRIVE; HYPEROSMOLAR HYPERGLYCEMIC STATE (HHS)      Today is hospital day 1d,   INTERVAL HPI/OVERNIGHT EVENTS: admitted for DKA/HHS o/n. Remains on insulin gtt.    This morning, he is laying in bed. Unable to complete history due to mental status.     Urinating and stooling appropriately.    Present Today:           Rothman Catheter ()No/ (x)Yes? Indwelling Urethral Catheter    Indication: retention?            Central Line (x)No/ ()Yes?  Indication:          IV Fluids ()No/ (x)Yes? D5+1/2NS Indication: DKA    OBJECTIVE  PAST MEDICAL & SURGICAL HISTORY  Prostate cancer    Dementia    Diabetes    No significant past surgical history      ALLERGIES:  No Known Allergies    HOME MEDICATIONS:  Home Medications:  DONEPEZIL HCL 5 MG TABLET: 1 tab(s) orally once a day (26 Mar 2022 00:46)  Haldol 0.5 mg oral tablet: 1 tab(s) orally once a day, As Needed (02 May 2021 15:39)  Melatonin 5 mg oral tablet: 1 tab(s) orally once a day (at bedtime) (04 May 2021 16:06)    MEDICATIONS:  STANDING MEDICATIONS  MEDICATIONS  (STANDING):  cefepime   IVPB      cefepime   IVPB 1000 milliGRAM(s) IV Intermittent every 12 hours  chlorhexidine 4% Liquid 1 Application(s) Topical daily  dextrose 5% + sodium chloride 0.45% with potassium chloride 20 mEq/L 1000 milliLiter(s) (75 mL/Hr) IV Continuous <Continuous>  donepezil 5 milliGRAM(s) Oral at bedtime  heparin   Injectable 5000 Unit(s) SubCutaneous every 12 hours  insulin regular Infusion 2 Unit(s)/Hr (2 mL/Hr) IV Continuous <Continuous>  metroNIDAZOLE  IVPB      pantoprazole    Tablet 40 milliGRAM(s) Oral before breakfast  polyethylene glycol 3350 17 Gram(s) Oral daily    PRN MEDICATIONS  MEDICATIONS  (PRN):  haloperidol     Tablet 0.5 milliGRAM(s) Oral at bedtime PRN Agitation/Confusion      VITAL SIGNS  T(F): 98 ( @ 08:00), Max: 99.7 ( @ 11:39)  HR: 88 ( @ 10:00) (85 - 124)  BP: 98/54 ( @ 10:00) (79/47 - 125/69)  RR: 21 ( @ 10:00) (18 - 29)  SpO2: 99% ( @ 10:00) (93% - 100%)    LABS:                        9.9    11.39 )-----------( 272      ( 2022 04:43 )             32.9         156<H>  |  120<H>  |  52<H>  ----------------------------<  132<H>  3.9   |  20  |  1.8<H>    Ca    7.9<L>      2022 04:43  Mg     1.7         TPro  7.3  /  Alb  2.8<L>  /  TBili  0.9  /  DBili  x   /  AST  12  /  ALT  16  /  AlkPhos  155<H>      PT/INR - ( 2022 13:14 )   PT: 13.00 sec;   INR: 1.13 ratio         PTT - ( 2022 13:14 )  PTT:33.2 sec  Urinalysis Basic - ( 2022 17:55 )    Color: Yellow / Appearance: Clear / S.030 / pH: x  Gluc: x / Ketone: Large  / Bili: Small / Urobili: <2 mg/dL   Blood: x / Protein: 100 mg/dL / Nitrite: Negative   Leuk Esterase: Negative / RBC: >50 /HPF / WBC 10-25 /HPF   Sq Epi: x / Non Sq Epi: Few / Bacteria: Moderate        CARDIAC MARKERS ( 2022 22:03 )  x     / x     / 305 U/L / x     / x            RADIOLOGY:   Reviewed  CXR negative  CT A/P - no acute pathology. Chronic comminuted nonunited impacted R femoral neck fx.      PHYSICAL EXAM:  General: Laying on the hospital bed. NAD. Obtunded.   HEENT: Atraumatic. Normocephalic. EOMI. Conjunctiva and sclera clear.  Cardiac: Normal S1, S2. RRR. No murmurs, rubs, or gallops.  Pulm: CTA b/l no wheezes, rhonchi, or rales. on RA.  GI: Soft, nontender, nondistended. BSx4q  Ext: Moving all 4 extremities. No edema, cyanosis. b/l LE contracted.  Neuro: obtunded  Skin: R foot eschar

## 2022-04-14 NOTE — CONSULT NOTE ADULT - SUBJECTIVE AND OBJECTIVE BOX
Podiatry Consult Note    Subjective:  GERMANIA PAGAN   Patient is a 87y old  Male who presents with a chief complaint of   HPI:  The patient is nonverbal, AAO*0, history mainly obtained from the charts.   Tried calling Isabelle: [phone] no answer, left a voice mail, will re-attempt, family may be interested in GOC discussion, DNR/DNI status.     86 y/o male with a PMH of uncontrolled DM, prostate ca in remission, and late stage alzheimer's presents to the ED for evaluation of failure to thrive. pt is a poor historian, and Ethiopian speaking. I spoke with pt daughter Isabelle who reports pt lives home and has not been eating or drinking since he left the hospital in march 2022 ( few weeks ago). She reports that the pt was recently admitted 03/25-03/29 for hypoglycemia and pathological hip fracture. she reports pt is at baseline, and baseline is confused. pt daughter reports she placed a flores in him at home yesterday and she is a nurse.  (13 Apr 2022 20:30)      Past Medical History and Surgical History  PAST MEDICAL & SURGICAL HISTORY:  Prostate cancer    Dementia    Diabetes    No significant past surgical history         Review of Systems:  [X] Ten point review of systems is otherwise negative except as noted     Objective:  Vital Signs Last 24 Hrs  T(C): 36.7 (14 Apr 2022 08:00), Max: 37.6 (13 Apr 2022 11:39)  T(F): 98 (14 Apr 2022 08:00), Max: 99.7 (13 Apr 2022 11:39)  HR: 86 (14 Apr 2022 08:00) (85 - 124)  BP: 87/53 (14 Apr 2022 08:00) (79/47 - 125/69)  BP(mean): 65 (14 Apr 2022 08:00) (58 - 76)  RR: 26 (14 Apr 2022 08:00) (18 - 29)  SpO2: 100% (14 Apr 2022 08:00) (93% - 100%)                        9.9    11.39 )-----------( 272      ( 14 Apr 2022 04:43 )             32.9                 04-14    156<H>  |  120<H>  |  52<H>  ----------------------------<  132<H>  3.9   |  20  |  1.8<H>    Ca    7.9<L>      14 Apr 2022 04:43  Mg     1.7     04-14    TPro  7.3  /  Alb  2.8<L>  /  TBili  0.9  /  DBili  x   /  AST  12  /  ALT  16  /  AlkPhos  155<H>  04-13        Physical Exam - Lower Extremity Focused:   Derm:   Right lateral midfoot dry eschar with eschar to the 2nd and 3rd digit/ webspace. No erythema no purulent drainage, no malodor.   Vascular: DP and PT Pulses Diminished; Foot is Warm to Warm to the touch; Capillary Refill Time < 3 Seconds;    Neuro: Protective Sensation Diminished / Moderately Neuropathic   MSK: Pain On Palpation at Wound Site     Assessment:  Right foot dry stable eschar.   Plan:  Chart reviewed and Patient evaluated. All Questions and Concerns Addressed and Answered  Local Wound Care; Wound Flushed w/ NS; Wound Packed w/ Betadine  paint  Recommend A-Duplex.   Strict decubitus precautions please offload pt heels  please order EZ boots.   Weight Bearing Status; WBAT   No podiatric surgical intervention indicated at this time.   Pt can follow up as an outpt with Dr. Griffin at [address] [place] [address].     Discussed Plan w/   Dr. Griffin.     Podiatry

## 2022-04-14 NOTE — PATIENT PROFILE ADULT - FALL HARM RISK - HARM RISK INTERVENTIONS

## 2022-04-14 NOTE — CONSULT NOTE ADULT - SUBJECTIVE AND OBJECTIVE BOX
Patient is a 87y old  Male who presents with a chief complaint of     HPI:  The patient is nonverbal, AAO*0, history mainly obtained from the charts.   Tried calling Isabelle: [phone] no answer, left a voice mail, will re-attempt, family may be interested in GOC discussion, DNR/DNI status.     86 y/o male with a PMH of uncontrolled DM, prostate ca in remission, and late stage alzheimer's presents to the ED for evaluation of failure to thrive. pt is a poor historian, and Georgian speaking. I spoke with pt daughter Isabelle who reports pt lives home and has not been eating or drinking since he left the hospital in 2022 ( few weeks ago). She reports that the pt was recently admitted - for hypoglycemia and pathological hip fracture. she reports pt is at baseline, and baseline is confused. pt daughter reports she placed a flores in him at home yesterday and she is a nurse.  (2022 20:30)      PAST MEDICAL & SURGICAL HISTORY:  Prostate cancer    Dementia    Diabetes    No significant past surgical history        SOCIAL HX:  uto    FAMILY HISTORY:  :  No known cardiovacular family hisotry     Review Of Systems:     All ROS are negative except per HPI       Allergies    No Known Allergies    Intolerances          PHYSICAL EXAM    ICU Vital Signs Last 24 Hrs  T(C): 36.3 (2022 04:00), Max: 37.6 (2022 11:39)  T(F): 97.4 (2022 04:00), Max: 99.7 (2022 11:39)  HR: 88 (2022 06:00) (85 - 124)  BP: 83/47 (2022 06:00) (79/47 - 125/69)  BP(mean): 60 (2022 06:00) (58 - 76)  ABP: --  ABP(mean): --  RR: 28 (2022 06:00) (18 - 29)  SpO2: 93% (2022 06:00) (93% - 99%)      CONSTITUTIONAL:  Ill appearing  NAD    ENT:   Airway patent,   Mouth with normal mucosa.   No thrush      CARDIAC:   Normal rate,   Regular rhythm.    No edema      Vascular:   normal systolic impulse  no bruits    RESPIRATORY:   No wheezing  Bilateral BS   Not tachypneic,  No use of accessory muscles    GASTROINTESTINAL:  Abdomen soft,   Non-tender,   No guarding,   + BS      NEUROLOGICAL:   does not follow commands  No motor deficits.              22 @ 07:01  -  22 @ 07:00  --------------------------------------------------------  IN:    Lactated Ringers Bolus: 1000 mL  Total IN: 1000 mL    OUT:    Voided (mL): 530 mL  Total OUT: 530 mL    Total NET: 470 mL          LABS:                          9.9    11.39 )-----------( 272      ( 2022 04:43 )             32.9                                                            9.9    11.39 )-----------( 272      (  @ 04:43 )             32.9                11.8   16.84 )-----------( 409      (  @ 13:14 )             40.7       156<H>  |  120<H>  |  52<H>  ----------------------------<  132<H>  3.9   |  20  |  1.8<H>    2022 04:43    156<H>  |  120<H>  |  52<H>  ----------------------------<  132<H>  3.9     |  20     |  1.8<H>  2022 01:49    152<H>  |  118<H>  |  55<H>  ----------------------------<  275<H>  4.0     |  17     |  1.9<H>    Ca    7.9<L>      2022 04:43  Ca    7.9<L>      2022 01:49  Mg     1.7<L>     2022 04:43    TPro  7.3    /  Alb  2.8<L>  /  TBili  0.9    /  DBili  x      /  AST  12     /  ALT  16     /  AlkPhos  155<H>  2022 13:14    Ca    7.9<L>      2022 04:43  Mg     1.7     04-14    TPro  7.3  /  Alb  2.8<L>  /  TBili  0.9  /  DBili  x   /  AST  12  /  ALT  16  /  AlkPhos  155<H>  04-13      PT/INR - ( 2022 13:14 )   PT: 13.00 sec;   INR: 1.13 ratio         PTT - ( 2022 13:14 )  PTT:33.2 sec                                       Urinalysis Basic - ( 2022 17:55 )    Color: Yellow / Appearance: Clear / S.030 / pH: x  Gluc: x / Ketone: Large  / Bili: Small / Urobili: <2 mg/dL   Blood: x / Protein: 100 mg/dL / Nitrite: Negative   Leuk Esterase: Negative / RBC: >50 /HPF / WBC 10-25 /HPF   Sq Epi: x / Non Sq Epi: Few / Bacteria: Moderate        CARDIAC MARKERS ( 2022 22:03 )  x     / x     / 305 U/L / x     / x                                                LIVER FUNCTIONS - ( 2022 13:14 )  Alb: 2.8 g/dL / Pro: 7.3 g/dL / ALK PHOS: 155 U/L / ALT: 16 U/L / AST: 12 U/L / GGT: x                                                                                                          MEDICATIONS  (STANDING):  chlorhexidine 4% Liquid 1 Application(s) Topical daily  dextrose 5% + sodium chloride 0.45% with potassium chloride 20 mEq/L 1000 milliLiter(s) (75 mL/Hr) IV Continuous <Continuous>  donepezil 5 milliGRAM(s) Oral at bedtime  heparin   Injectable 5000 Unit(s) SubCutaneous every 12 hours  insulin regular Infusion 1 Unit(s)/Hr (1 mL/Hr) IV Continuous <Continuous>  pantoprazole    Tablet 40 milliGRAM(s) Oral before breakfast  polyethylene glycol 3350 17 Gram(s) Oral daily    MEDICATIONS  (PRN):  haloperidol     Tablet 0.5 milliGRAM(s) Oral at bedtime PRN Agitation/Confusion

## 2022-04-14 NOTE — SWALLOW BEDSIDE ASSESSMENT ADULT - SLP PERTINENT HISTORY OF CURRENT PROBLEM
pro87 y/o male with a PMH of uncontrolled DM, prostate ca in remission, and late stage alzheimer's presents to the ED for evaluation of failure to thrive. pt is a poor historian, and Hong Konger speaking. Severe alzheimer disease ?Starvation ketoacidosis

## 2022-04-14 NOTE — CONSULT NOTE ADULT - SUBJECTIVE AND OBJECTIVE BOX
DATE OF ADMISSION: 04-13-22  HOSPITAL DAY: 1d    HISTORY OF PRESENT ILLNESS:   The patient is nonverbal, AAO*0, history mainly obtained from the charts.   Tried calling Isabelle: [phone] no answer, left a voice mail, will re-attempt, family may be interested in GOC discussion, DNR/DNI status.     88 y/o male with a PMH of uncontrolled DM, prostate ca in remission, and late stage alzheimer's presents to the ED for evaluation of failure to thrive. pt is a poor historian, and Norwegian speaking. I spoke with pt daughter Isabelle who reports pt lives home and has not been eating or drinking since he left the hospital in march 2022 ( few weeks ago). She reports that the pt was recently admitted 03/25-03/29 for hypoglycemia and pathological hip fracture. she reports pt is at baseline, and baseline is confused. pt daughter reports she placed a flores in him at home yesterday and she is a nurse.  (13 Apr 2022 20:30)      PAST MEDICAL & SURGICAL HISTORY:  Prostate cancer    Dementia    Diabetes    No significant past surgical history      ALLERGIES:  No Known Allergies    MEDICATIONS:  MEDICATIONS  (STANDING):  cefepime   IVPB      cefepime   IVPB 1000 milliGRAM(s) IV Intermittent once  cefepime   IVPB 1000 milliGRAM(s) IV Intermittent every 12 hours  chlorhexidine 4% Liquid 1 Application(s) Topical daily  dextrose 5% + sodium chloride 0.45% with potassium chloride 20 mEq/L 1000 milliLiter(s) (75 mL/Hr) IV Continuous <Continuous>  donepezil 5 milliGRAM(s) Oral at bedtime  heparin   Injectable 5000 Unit(s) SubCutaneous every 12 hours  insulin regular Infusion 1 Unit(s)/Hr (1 mL/Hr) IV Continuous <Continuous>  lactated ringers Bolus 1000 milliLiter(s) IV Bolus once  pantoprazole    Tablet 40 milliGRAM(s) Oral before breakfast  polyethylene glycol 3350 17 Gram(s) Oral daily    MEDICATIONS  (PRN):  haloperidol     Tablet 0.5 milliGRAM(s) Oral at bedtime PRN Agitation/Confusion    HOME MEDICATIONS:  Home Medications:  DONEPEZIL HCL 5 MG TABLET: 1 tab(s) orally once a day (26 Mar 2022 00:46)  Haldol 0.5 mg oral tablet: 1 tab(s) orally once a day, As Needed (02 May 2021 15:39)  Melatonin 5 mg oral tablet: 1 tab(s) orally once a day (at bedtime) (04 May 2021 16:06)      VITALS:   T(C): 36.7 (04-14-22 @ 08:00), Max: 37.6 (04-13-22 @ 11:39)  HR: 86 (04-14-22 @ 08:00) (85 - 124)  BP: 87/53 (04-14-22 @ 08:00) (79/47 - 125/69)  RR: 26 (04-14-22 @ 08:00) (18 - 29)  SpO2: 100% (04-14-22 @ 08:00) (93% - 100%)  Wt(kg): --    04-13-22 @ 07:01  -  04-14-22 @ 07:00  --------------------------------------------------------  IN: 1000 mL / OUT: 530 mL / NET: 470 mL    04-14-22 @ 07:01  -  04-14-22 @ 09:48  --------------------------------------------------------  IN: 1050 mL / OUT: 0 mL / NET: 1050 mL        PHYSICAL EXAM:  GENERAL: NAD, Resting in bed  HEENT: NCAT  CHEST/LUNG: Clear to auscultation bilaterally; No wheezing or rubs.   HEART: Regular rate and rhythm; No murmurs, rubs, or gallops  ABDOMEN: Bowel sounds present; Soft, Nontender, Nondistended.   EXTREMITIES:  No clubbing, cyanosis, or edema  NERVOUS SYSTEM:  Alert & Oriented X3    LABS:                        9.9    11.39 )-----------( 272      ( 14 Apr 2022 04:43 )             32.9     04-14    156<H>  |  120<H>  |  52<H>  ----------------------------<  132<H>  3.9   |  20  |  1.8<H>    Ca    7.9<L>      14 Apr 2022 04:43  Mg     1.7     04-14    TPro  7.3  /  Alb  2.8<L>  /  TBili  0.9  /  DBili  x   /  AST  12  /  ALT  16  /  AlkPhos  155<H>  04-13    CARDIAC MARKERS ( 13 Apr 2022 22:03 )  x     / x     / 305 U/L / x     / x          PT/INR - ( 13 Apr 2022 13:14 )   PT: 13.00 sec;   INR: 1.13 ratio    PTT - ( 13 Apr 2022 13:14 )  PTT:33.2 sec    Creatine Kinase, Serum: 305 U/L (04-13 @ 22:03)   DATE OF ADMISSION: 04-13-22  HOSPITAL DAY: 1d    HISTORY OF PRESENT ILLNESS:   The patient is nonverbal, AAO*0, history mainly obtained from the charts.   Tried calling Isabelle: [phone] no answer, left a voice mail, will re-attempt, family may be interested in GOC discussion, DNR/DNI status.     86 y/o male with a PMH of uncontrolled DM, prostate ca in remission, and late stage alzheimer's presents to the ED for evaluation of failure to thrive. pt is a poor historian, and Emirati speaking. I spoke with pt daughter Isabelle who reports pt lives home and has not been eating or drinking since he left the hospital in march 2022 ( few weeks ago). She reports that the pt was recently admitted 03/25-03/29 for hypoglycemia and pathological hip fracture. she reports pt is at baseline, and baseline is confused. pt daughter reports she placed a flores in him at home yesterday and she is a nurse.  (13 Apr 2022 20:30)      PAST MEDICAL & SURGICAL HISTORY:  Prostate cancer    Dementia    Diabetes    No significant past surgical history      ALLERGIES:  No Known Allergies    MEDICATIONS:  MEDICATIONS  (STANDING):  cefepime   IVPB      cefepime   IVPB 1000 milliGRAM(s) IV Intermittent once  cefepime   IVPB 1000 milliGRAM(s) IV Intermittent every 12 hours  chlorhexidine 4% Liquid 1 Application(s) Topical daily  dextrose 5% + sodium chloride 0.45% with potassium chloride 20 mEq/L 1000 milliLiter(s) (75 mL/Hr) IV Continuous <Continuous>  donepezil 5 milliGRAM(s) Oral at bedtime  heparin   Injectable 5000 Unit(s) SubCutaneous every 12 hours  insulin regular Infusion 1 Unit(s)/Hr (1 mL/Hr) IV Continuous <Continuous>  lactated ringers Bolus 1000 milliLiter(s) IV Bolus once  pantoprazole    Tablet 40 milliGRAM(s) Oral before breakfast  polyethylene glycol 3350 17 Gram(s) Oral daily    MEDICATIONS  (PRN):  haloperidol     Tablet 0.5 milliGRAM(s) Oral at bedtime PRN Agitation/Confusion    HOME MEDICATIONS:  Home Medications:  DONEPEZIL HCL 5 MG TABLET: 1 tab(s) orally once a day (26 Mar 2022 00:46)  Haldol 0.5 mg oral tablet: 1 tab(s) orally once a day, As Needed (02 May 2021 15:39)  Melatonin 5 mg oral tablet: 1 tab(s) orally once a day (at bedtime) (04 May 2021 16:06)      VITALS:   T(C): 36.7 (04-14-22 @ 08:00), Max: 37.6 (04-13-22 @ 11:39)  HR: 86 (04-14-22 @ 08:00) (85 - 124)  BP: 87/53 (04-14-22 @ 08:00) (79/47 - 125/69)  RR: 26 (04-14-22 @ 08:00) (18 - 29)  SpO2: 100% (04-14-22 @ 08:00) (93% - 100%)  Wt(kg): --    04-13-22 @ 07:01  -  04-14-22 @ 07:00  --------------------------------------------------------  IN: 1000 mL / OUT: 530 mL / NET: 470 mL    04-14-22 @ 07:01  -  04-14-22 @ 09:48  --------------------------------------------------------  IN: 1050 mL / OUT: 0 mL / NET: 1050 mL        PHYSICAL EXAM:  GENERAL: NAD, Resting in bed, frail  HEENT: NCAT  CHEST/LUNG: not in respiratory distress  HEART: Regular rate and rhythm  ABDOMEN: Soft, Nondistended.   EXTREMITIES:  excoriation/wounds of LE and contracted, Feet necrotic lesions  NERVOUS SYSTEM:  Alert & Oriented X0    LABS:                        9.9    11.39 )-----------( 272      ( 14 Apr 2022 04:43 )             32.9     04-14    156<H>  |  120<H>  |  52<H>  ----------------------------<  132<H>  3.9   |  20  |  1.8<H>    Ca    7.9<L>      14 Apr 2022 04:43  Mg     1.7     04-14    TPro  7.3  /  Alb  2.8<L>  /  TBili  0.9  /  DBili  x   /  AST  12  /  ALT  16  /  AlkPhos  155<H>  04-13    CARDIAC MARKERS ( 13 Apr 2022 22:03 )  x     / x     / 305 U/L / x     / x          PT/INR - ( 13 Apr 2022 13:14 )   PT: 13.00 sec;   INR: 1.13 ratio    PTT - ( 13 Apr 2022 13:14 )  PTT:33.2 sec    Creatine Kinase, Serum: 305 U/L (04-13 @ 22:03)

## 2022-04-14 NOTE — CONSULT NOTE ADULT - ASSESSMENT
88 y/o male with a PMH of uncontrolled DM, prostate ca in remission, and late stage alzheimer's presents to the ED for evaluation of failure to thrive. Endocrinology is being consulted for management of DKA.     # DKA in DM II  - Adm: , AG 34, BHB 8.5, pH 7.27, UA large ketones, serum osm 380  - currently on D51/2ns at 75 and insulin ggt at 1ml/hr: may add potassium to keep K>4   88 y/o male with a PMH of uncontrolled DM, prostate ca in remission, and late stage alzheimer's presents to the ED for evaluation of failure to thrive. Endocrinology is being consulted for management of DKA.     # DKA in DM II  - Adm: , AG 34, BHB 8.5, pH 7.27, UA large ketones, serum osm 380  - currently on D51/2ns at 75 and insulin ggt at 1ml/hr: may add potassium to keep K>4  - advise IVF and insulin ggt until feed started

## 2022-04-14 NOTE — CHART NOTE - NSCHARTNOTEFT_GEN_A_CORE
Discussed with daughter Isabelle at bedside along with senior resident Dr. Bowman.    Discussed goals of care for pt. Initially Isabelle was agreeable to DNR/DNI status, however later decided against signing MOLST form.  Isabelle hopes for pt to have PEG tube placed and brought home.    NGT placed. Confirmed via CXR. Feeds ordered.  GI consulted for PEG.    Will sign-out to covering resident to transition pt from insulin gtt to SQI as DKA has resolved.  Will discuss with covering resident regarding pt's drop in Na and need for IVF change. Discussed with daughter Isabelle at bedside along with senior resident Dr. Bowman.    Discussed goals of care for pt. Initially Isabelle was agreeable to DNR/DNI status, however later decided against signing MOLST form.  Isabelle hopes for pt to have PEG tube placed and brought home.    NGT placed. Confirmed via CXR. Feeds ordered.  GI consulted for PEG.    Will sign-out to covering resident to transition pt from insulin gtt to SQI as DKA has resolved.    D/w senior resident regarding overcorrection of hypernatremia -- change IVF from D5+1/2NS to D5+NS@ 75. Rpt BMP @ 8 PM, 11:30 PM, AM. Hold free water flushes.

## 2022-04-14 NOTE — CONSULT NOTE ADULT - ASSESSMENT
ASSESSMENT  86 y/o male with a PMH of uncontrolled DM, prostate ca in remission, and late stage alzheimer's presents to the ED for evaluation of failure to thrive. ID consulted for LE ulcer    IMPRESSION  #  #SIRS on admission P>90 WBC 16, rule out severe sepsis, lactic acidosis    UA WBC 10-25    CXR no PNA    LE ulcer  #DM with DKA  #Left basilar subsegmental atelectasis.  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  #QTC Calculation(Bazett) 501 ms  #MANDA  Creatinine, Serum: 1.8 (04-14-22 @ 04:43)    Weight (kg): 44.4 (04-14-22 @ 00:39)    RECOMMENDATIONS  This is an incomplete consult note. All final recommendations to follow after interview and examination of the patient. Please follow recommendations noted below.    If any questions, please call or send a message on [facility] Teams  Please continue to update ID with any pertinent new laboratory or radiographic findings  Spectra [phone]   ASSESSMENT  86 y/o male with a PMH of uncontrolled DM, prostate ca in remission, and late stage alzheimer's presents to the ED for evaluation of failure to thrive. ID consulted for LE ulcer    IMPRESSION  #SIRS on admission P>90 WBC 16, rule out severe sepsis, lactic acidosis, rule out acute cystitis    UA WBC 10-25    CXR no PNA    10/2021 e. faecalis  #LE dry gangrene - no infection  #DM with DKA  #Left basilar subsegmental atelectasis.  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  #QTC Calculation(Bazett) 501 ms  #MANDA  Creatinine, Serum: 1.8 (04-14-22 @ 04:43)    Weight (kg): 44.4 (04-14-22 @ 00:39)    RECOMMENDATIONS  - D/C cefepime, previous UCX e. faecalis- Zosyn 3.375 q12h IV  - f/u UCX, BCX    If any questions, please call or send a message on [email or web address] Teams  Please continue to update ID with any pertinent new laboratory or radiographic findings  Spectra [phone]

## 2022-04-15 LAB
24R-OH-CALCIDIOL SERPL-MCNC: 23 NG/ML — LOW (ref 30–80)
ALBUMIN SERPL ELPH-MCNC: 2.3 G/DL — LOW (ref 3.5–5.2)
ALP SERPL-CCNC: 144 U/L — HIGH (ref 30–115)
ALT FLD-CCNC: 20 U/L — SIGNIFICANT CHANGE UP (ref 0–41)
ANION GAP SERPL CALC-SCNC: 11 MMOL/L — SIGNIFICANT CHANGE UP (ref 7–14)
ANION GAP SERPL CALC-SCNC: 12 MMOL/L — SIGNIFICANT CHANGE UP (ref 7–14)
ANION GAP SERPL CALC-SCNC: 14 MMOL/L — SIGNIFICANT CHANGE UP (ref 7–14)
ANION GAP SERPL CALC-SCNC: 14 MMOL/L — SIGNIFICANT CHANGE UP (ref 7–14)
AST SERPL-CCNC: 34 U/L — SIGNIFICANT CHANGE UP (ref 0–41)
BASOPHILS # BLD AUTO: 0.02 K/UL — SIGNIFICANT CHANGE UP (ref 0–0.2)
BASOPHILS NFR BLD AUTO: 0.1 % — SIGNIFICANT CHANGE UP (ref 0–1)
BILIRUB SERPL-MCNC: 0.7 MG/DL — SIGNIFICANT CHANGE UP (ref 0.2–1.2)
BUN SERPL-MCNC: 33 MG/DL — HIGH (ref 10–20)
BUN SERPL-MCNC: 36 MG/DL — HIGH (ref 10–20)
BUN SERPL-MCNC: 37 MG/DL — HIGH (ref 10–20)
BUN SERPL-MCNC: 38 MG/DL — HIGH (ref 10–20)
CALCIUM SERPL-MCNC: 7.7 MG/DL — LOW (ref 8.5–10.1)
CALCIUM SERPL-MCNC: 7.9 MG/DL — LOW (ref 8.5–10.1)
CALCIUM SERPL-MCNC: 8 MG/DL — LOW (ref 8.5–10.1)
CALCIUM SERPL-MCNC: 8.1 MG/DL — LOW (ref 8.5–10.1)
CALCIUM SERPL-MCNC: 8.2 MG/DL — LOW (ref 8.4–10.5)
CHLORIDE SERPL-SCNC: 116 MMOL/L — HIGH (ref 98–110)
CHLORIDE SERPL-SCNC: 117 MMOL/L — HIGH (ref 98–110)
CHLORIDE SERPL-SCNC: 118 MMOL/L — HIGH (ref 98–110)
CHLORIDE SERPL-SCNC: 119 MMOL/L — HIGH (ref 98–110)
CO2 SERPL-SCNC: 19 MMOL/L — SIGNIFICANT CHANGE UP (ref 17–32)
CO2 SERPL-SCNC: 20 MMOL/L — SIGNIFICANT CHANGE UP (ref 17–32)
CO2 SERPL-SCNC: 21 MMOL/L — SIGNIFICANT CHANGE UP (ref 17–32)
CO2 SERPL-SCNC: 21 MMOL/L — SIGNIFICANT CHANGE UP (ref 17–32)
CREAT SERPL-MCNC: 1 MG/DL — SIGNIFICANT CHANGE UP (ref 0.7–1.5)
CREAT SERPL-MCNC: 1.2 MG/DL — SIGNIFICANT CHANGE UP (ref 0.7–1.5)
EGFR: 59 ML/MIN/1.73M2 — LOW
EGFR: 73 ML/MIN/1.73M2 — SIGNIFICANT CHANGE UP
EOSINOPHIL # BLD AUTO: 0.04 K/UL — SIGNIFICANT CHANGE UP (ref 0–0.7)
EOSINOPHIL NFR BLD AUTO: 0.3 % — SIGNIFICANT CHANGE UP (ref 0–8)
GLUCOSE BLDC GLUCOMTR-MCNC: 167 MG/DL — HIGH (ref 70–99)
GLUCOSE BLDC GLUCOMTR-MCNC: 171 MG/DL — HIGH (ref 70–99)
GLUCOSE BLDC GLUCOMTR-MCNC: 195 MG/DL — HIGH (ref 70–99)
GLUCOSE BLDC GLUCOMTR-MCNC: 199 MG/DL — HIGH (ref 70–99)
GLUCOSE BLDC GLUCOMTR-MCNC: 211 MG/DL — HIGH (ref 70–99)
GLUCOSE BLDC GLUCOMTR-MCNC: 219 MG/DL — HIGH (ref 70–99)
GLUCOSE BLDC GLUCOMTR-MCNC: 51 MG/DL — CRITICAL LOW (ref 70–99)
GLUCOSE BLDC GLUCOMTR-MCNC: 54 MG/DL — CRITICAL LOW (ref 70–99)
GLUCOSE BLDC GLUCOMTR-MCNC: 65 MG/DL — LOW (ref 70–99)
GLUCOSE SERPL-MCNC: 196 MG/DL — HIGH (ref 70–99)
GLUCOSE SERPL-MCNC: 211 MG/DL — HIGH (ref 70–99)
GLUCOSE SERPL-MCNC: 223 MG/DL — HIGH (ref 70–99)
GLUCOSE SERPL-MCNC: 56 MG/DL — LOW (ref 70–99)
HCT VFR BLD CALC: 33.7 % — LOW (ref 42–52)
HGB BLD-MCNC: 10.1 G/DL — LOW (ref 14–18)
IMM GRANULOCYTES NFR BLD AUTO: 0.7 % — HIGH (ref 0.1–0.3)
LACTATE SERPL-SCNC: 3.1 MMOL/L — HIGH (ref 0.7–2)
LYMPHOCYTES # BLD AUTO: 1.02 K/UL — LOW (ref 1.2–3.4)
LYMPHOCYTES # BLD AUTO: 7.4 % — LOW (ref 20.5–51.1)
MAGNESIUM SERPL-MCNC: 1.8 MG/DL — SIGNIFICANT CHANGE UP (ref 1.8–2.4)
MCHC RBC-ENTMCNC: 26.5 PG — LOW (ref 27–31)
MCHC RBC-ENTMCNC: 30 G/DL — LOW (ref 32–37)
MCV RBC AUTO: 88.5 FL — SIGNIFICANT CHANGE UP (ref 80–94)
MONOCYTES # BLD AUTO: 0.5 K/UL — SIGNIFICANT CHANGE UP (ref 0.1–0.6)
MONOCYTES NFR BLD AUTO: 3.6 % — SIGNIFICANT CHANGE UP (ref 1.7–9.3)
NEUTROPHILS # BLD AUTO: 12.02 K/UL — HIGH (ref 1.4–6.5)
NEUTROPHILS NFR BLD AUTO: 87.9 % — HIGH (ref 42.2–75.2)
NRBC # BLD: 0 /100 WBCS — SIGNIFICANT CHANGE UP (ref 0–0)
PHOSPHATE SERPL-MCNC: 1.5 MG/DL — LOW (ref 2.1–4.9)
PLATELET # BLD AUTO: 247 K/UL — SIGNIFICANT CHANGE UP (ref 130–400)
POTASSIUM SERPL-MCNC: 3.8 MMOL/L — SIGNIFICANT CHANGE UP (ref 3.5–5)
POTASSIUM SERPL-MCNC: 3.8 MMOL/L — SIGNIFICANT CHANGE UP (ref 3.5–5)
POTASSIUM SERPL-MCNC: 3.9 MMOL/L — SIGNIFICANT CHANGE UP (ref 3.5–5)
POTASSIUM SERPL-MCNC: 4.1 MMOL/L — SIGNIFICANT CHANGE UP (ref 3.5–5)
POTASSIUM SERPL-SCNC: 3.8 MMOL/L — SIGNIFICANT CHANGE UP (ref 3.5–5)
POTASSIUM SERPL-SCNC: 3.8 MMOL/L — SIGNIFICANT CHANGE UP (ref 3.5–5)
POTASSIUM SERPL-SCNC: 3.9 MMOL/L — SIGNIFICANT CHANGE UP (ref 3.5–5)
POTASSIUM SERPL-SCNC: 4.1 MMOL/L — SIGNIFICANT CHANGE UP (ref 3.5–5)
PROCALCITONIN SERPL-MCNC: 0.22 NG/ML — HIGH (ref 0.02–0.1)
PROT SERPL-MCNC: 5.9 G/DL — LOW (ref 6–8)
PTH-INTACT FLD-MCNC: 21 PG/ML — SIGNIFICANT CHANGE UP (ref 15–65)
RBC # BLD: 3.81 M/UL — LOW (ref 4.7–6.1)
RBC # FLD: 19.3 % — HIGH (ref 11.5–14.5)
SODIUM SERPL-SCNC: 150 MMOL/L — HIGH (ref 135–146)
SODIUM SERPL-SCNC: 150 MMOL/L — HIGH (ref 135–146)
SODIUM SERPL-SCNC: 151 MMOL/L — HIGH (ref 135–146)
SODIUM SERPL-SCNC: 151 MMOL/L — HIGH (ref 135–146)
TROPONIN T SERPL-MCNC: 0.07 NG/ML — CRITICAL HIGH
WBC # BLD: 13.7 K/UL — HIGH (ref 4.8–10.8)
WBC # FLD AUTO: 13.7 K/UL — HIGH (ref 4.8–10.8)

## 2022-04-15 PROCEDURE — 71045 X-RAY EXAM CHEST 1 VIEW: CPT | Mod: 26

## 2022-04-15 PROCEDURE — 99233 SBSQ HOSP IP/OBS HIGH 50: CPT

## 2022-04-15 RX ORDER — INSULIN GLARGINE 100 [IU]/ML
15 INJECTION, SOLUTION SUBCUTANEOUS EVERY MORNING
Refills: 0 | Status: DISCONTINUED | OUTPATIENT
Start: 2022-04-15 | End: 2022-04-18

## 2022-04-15 RX ORDER — SODIUM CHLORIDE 9 MG/ML
1000 INJECTION, SOLUTION INTRAVENOUS
Refills: 0 | Status: DISCONTINUED | OUTPATIENT
Start: 2022-04-15 | End: 2022-04-17

## 2022-04-15 RX ORDER — POTASSIUM PHOSPHATE, MONOBASIC POTASSIUM PHOSPHATE, DIBASIC 236; 224 MG/ML; MG/ML
30 INJECTION, SOLUTION INTRAVENOUS ONCE
Refills: 0 | Status: COMPLETED | OUTPATIENT
Start: 2022-04-15 | End: 2022-04-15

## 2022-04-15 RX ORDER — INSULIN LISPRO 100/ML
VIAL (ML) SUBCUTANEOUS
Refills: 0 | Status: DISCONTINUED | OUTPATIENT
Start: 2022-04-15 | End: 2022-04-21

## 2022-04-15 RX ORDER — INSULIN LISPRO 100/ML
3 VIAL (ML) SUBCUTANEOUS
Refills: 0 | Status: DISCONTINUED | OUTPATIENT
Start: 2022-04-15 | End: 2022-04-15

## 2022-04-15 RX ORDER — DEXTROSE MONOHYDRATE, SODIUM CHLORIDE, AND POTASSIUM CHLORIDE 50; .745; 4.5 G/1000ML; G/1000ML; G/1000ML
1000 INJECTION, SOLUTION INTRAVENOUS
Refills: 0 | Status: DISCONTINUED | OUTPATIENT
Start: 2022-04-15 | End: 2022-04-15

## 2022-04-15 RX ORDER — INSULIN LISPRO 100/ML
3 VIAL (ML) SUBCUTANEOUS EVERY 6 HOURS
Refills: 0 | Status: DISCONTINUED | OUTPATIENT
Start: 2022-04-15 | End: 2022-04-15

## 2022-04-15 RX ORDER — DEXTROSE 50 % IN WATER 50 %
50 SYRINGE (ML) INTRAVENOUS ONCE
Refills: 0 | Status: COMPLETED | OUTPATIENT
Start: 2022-04-15 | End: 2022-04-15

## 2022-04-15 RX ORDER — INSULIN LISPRO 100/ML
5 VIAL (ML) SUBCUTANEOUS EVERY 8 HOURS
Refills: 0 | Status: DISCONTINUED | OUTPATIENT
Start: 2022-04-15 | End: 2022-04-18

## 2022-04-15 RX ADMIN — Medication 3 UNIT(S): at 10:51

## 2022-04-15 RX ADMIN — DEXTROSE MONOHYDRATE, SODIUM CHLORIDE, AND POTASSIUM CHLORIDE 75 MILLILITER(S): 50; .745; 4.5 INJECTION, SOLUTION INTRAVENOUS at 05:52

## 2022-04-15 RX ADMIN — PIPERACILLIN AND TAZOBACTAM 25 GRAM(S): 4; .5 INJECTION, POWDER, LYOPHILIZED, FOR SOLUTION INTRAVENOUS at 18:24

## 2022-04-15 RX ADMIN — POTASSIUM PHOSPHATE, MONOBASIC POTASSIUM PHOSPHATE, DIBASIC 83.33 MILLIMOLE(S): 236; 224 INJECTION, SOLUTION INTRAVENOUS at 10:29

## 2022-04-15 RX ADMIN — Medication 2: at 07:11

## 2022-04-15 RX ADMIN — HEPARIN SODIUM 5000 UNIT(S): 5000 INJECTION INTRAVENOUS; SUBCUTANEOUS at 18:32

## 2022-04-15 RX ADMIN — PIPERACILLIN AND TAZOBACTAM 25 GRAM(S): 4; .5 INJECTION, POWDER, LYOPHILIZED, FOR SOLUTION INTRAVENOUS at 05:51

## 2022-04-15 RX ADMIN — PANTOPRAZOLE SODIUM 40 MILLIGRAM(S): 20 TABLET, DELAYED RELEASE ORAL at 06:54

## 2022-04-15 RX ADMIN — HEPARIN SODIUM 5000 UNIT(S): 5000 INJECTION INTRAVENOUS; SUBCUTANEOUS at 05:52

## 2022-04-15 RX ADMIN — Medication 50 MILLILITER(S): at 01:30

## 2022-04-15 RX ADMIN — INSULIN GLARGINE 15 UNIT(S): 100 INJECTION, SOLUTION SUBCUTANEOUS at 05:50

## 2022-04-15 RX ADMIN — Medication 5 UNIT(S): at 22:41

## 2022-04-15 RX ADMIN — DONEPEZIL HYDROCHLORIDE 5 MILLIGRAM(S): 10 TABLET, FILM COATED ORAL at 22:53

## 2022-04-15 RX ADMIN — Medication 3 UNIT(S): at 07:12

## 2022-04-15 RX ADMIN — CHLORHEXIDINE GLUCONATE 1 APPLICATION(S): 213 SOLUTION TOPICAL at 11:15

## 2022-04-15 RX ADMIN — Medication 1: at 10:51

## 2022-04-15 NOTE — PROGRESS NOTE ADULT - ASSESSMENT
IMPRESSION:  DKA/HHS  Starvation Ketoacidosis  HAGMA with lactic acidosis  Alzheimer disease   MANDA  Right foot ulcer  Hypernatremia    PLAN:    CNS: Avoid CNS depressants.      HEENT: Oral care    PULMONARY:  HOB @ 45 degrees. Aspiration Precautions .     CARDIOVASCULAR:  switch fluid to D5w 70cc/ hr follow NA closely when NA less then 145 d/c fluid     GI: GI prophylaxis.  Needs NGT  and free water 250cc/ Q 6 hrs   speech and swallow GI eval for peg tube   RENAL: Free water.  BMP Q12 Follow up lytes.  Correct as needed. r    INFECTIOUS DISEASE: Follow up cultures. cefepime and flagyl. Nasal MRSA . ID consult    HEMATOLOGICAL:  DVT prophylaxis.    ENDOCRINE:  Follow up FS Endocrinology startNG feed and start lantus first dose now follow FS closely     MUSCULOSKELETAL: Bedrest . b/l LE duplex. Podiatry consult    Rothman:  d/c   Lines:  Peripheral IV   Code status: Full code  Disposition: CCU    [facility]  Palliative care consult     Guarded prognosis  transfer to floor      IMPRESSION:  DKA/HHS  Starvation Ketoacidosis  HAGMA with lactic acidosis  Alzheimer disease   MANDA  Right foot ulcer  Hypernatremia    PLAN:    CNS: Avoid CNS depressants.      HEENT: Oral care    PULMONARY:  HOB @ 45 degrees. Aspiration Precautions .     CARDIOVASCULAR:  switch fluid to D5w 70cc/ hr follow NA closely when NA less then 145 d/c fluid     GI: GI prophylaxis.  Needs NGT  and free water 250cc/ Q 6 hrs   speech and swallow GI eval for peg tube   RENAL: Free water.  BMP Q12 Follow up lytes.  Correct as needed. r    INFECTIOUS DISEASE: Follow up cultures. cefepime and flagyl. Nasal MRSA . ID consult    HEMATOLOGICAL:  DVT prophylaxis.    ENDOCRINE:  Follow up FS Endocrinology startNG feed and start lantus first dose now follow FS closely     MUSCULOSKELETAL: Bedrest . b/l LE duplex. Podiatry consult    Rothman:  d/c     Lines:  Peripheral IV   Code status: Full code  Disposition: CCU    [facility]  Palliative care consult     Guarded prognosis  transfer to floor afternoon if AG remain closed and FS between 110 -200 and NA improving   VS stable

## 2022-04-15 NOTE — PROGRESS NOTE ADULT - ASSESSMENT
ASSESSMENT & PLAN  86 y/o male with a PMH of uncontrolled DM, prostate ca in remission, and late stage alzheimer's presents to the ED for evaluation of failure to thrive. pt is a poor historian, and Hungarian speaking.    # DKA, resolved  # HAGMA, resolved  # Lactic acidosis, improving  # ?Starvation ketoacidosis, resolving  - FS: 219, 199, 167, 54, 51, 65, 125, 62, 265, 304, 287, 281, 214  - on admission, acidotic, BHB 8.5, UA+ketones, glucose, , AG 34, Sosm 380 (elev)  - started on insulin gtt, D5+NS -- transitioned to SQI this morning  - pt not tolerating PO 2/2 mental status, NGT placed yesterday, feeds started  - AG now 14, bicarb now 21  - LA 4.0->2.9->3.4->5.1->3.1, rpt today  - a1c 9.8 (22)  - ->60; Trop 0.08->0.07  - FS q6h  - unclear precipitating factor  - endo c/s appreciated -- check PTH, TSH, T4    # Severe alzheimer disease  - pt nonverbal, AOx0  - lives at home with daughter  - poor PO intake over past several weeks  - pt obtunded this AM  - s/p speech eval -- NPO. NGT placed.  - [facility] d/w daughter - wants PEG placed and goal to take pt home  - GI consult placed for PEG    # MANDA, resolving  - Cr 1.7 on admission, now 1.2 -- baseline ~0.7-0.9  - BUN/Cr ratio >25, suspect prerenal in setting of poor PO intake  - cont IVF    # Hypernatremia  - Na 154->151->151  - IVF with D5@70cc/hr -- dc fluids when Na reaches 145  - free water via NGT  - BMP q6h to monitor Na, correct 4-6/24h    # R foot eschar  - s/p podiatry eval - local wound care, EZ boots, WBAT, no surgical intervention  - f/u LE arterial duplex read  - check MRSA  - procal 0.22  - f/u outpatient podiatry    # Positive UA  - BCx x2 NGTD  - UCx normal whitney  - UA with mod bacteria  - s/p ID eval - zosyn (hx of E. faecalis)  - ID f/u as cultures negative, pt afebrile, mild leukocytosis    TOV today    PT/REHAB n/a  ACTIVITY: bedrest  DVT PPX: heparin   Injectable -- f/u LE duplex  GI PPX: pantoprazole  DIET: NPO w tube feeds  CODE: full  Disposition: CCU -- if no recurrence of DKA on 11AM BMP -- downgrade to floors  Pendin AM BMP; GI c/s; TOV; ID f/u; f/u Na

## 2022-04-15 NOTE — ADVANCED PRACTICE NURSE CONSULT - RECOMMEDATIONS
1. Stage 2 coccyx-b/l buttock pressure injury- Applying [employer] [place] Protectiv barrier cream daily and prn after each incontinent episode.    -Assess skin/wound qshift, report changes to primary provider.     Additional recs:   -Continue turning/positioning patient from side-to-side q2h while in bed, or in accordance w/ pt's plan of care. Utilize pillows to assist w/ turning/positioning. Place pillow in between contracted BLEs.  -Offload heels from bed surface by applying offloading boots to BLEs.    -Continue utilizing one underpad underneath patient to contain incontinence episodes; change pad when saturated/soiled.   -When/if flores d/c'ed & patient w/ consistent urinary incontinence, consider utilizing external urinary device or condom catheter (if patient candidate) to contain any urinary incontinence.   -Continue nutrition consult & tight glucose control for optimal wound healing & nutritional status.   -Assess skin/wound qshift, report changes to primary provider.     Plan of Care: Primary RN Bj at bedside & made aware of above recs. Spoke w/  covering/primary CCU MD James  in regards to above. No further needs/recs from [facility] service at this time. Staff RN to perform routine skin/wound assessment and manage wound care. Questions or concerns or if wound worsens and reconsult needed, please contact [facility], [phone].

## 2022-04-15 NOTE — PROGRESS NOTE ADULT - SUBJECTIVE AND OBJECTIVE BOX
GERMANIA PAGAN  87y, Male  Allergy: No Known Allergies      LOS  2d    CHIEF COMPLAINT: FTT (15 Apr 2022 11:03)      INTERVAL EVENTS/HPI  - No acute events overnight  - T(F): , Max: 98.4 (22 @ 16:00)  - Tolerating medication  - WBC Count: 13.70 (04-15-22 @ 04:30)  WBC Count: 11.39 (22 @ 04:43)     - Creatinine, Serum: 1.2 (04-15-22 @ 12:50)  Creatinine, Serum: 1.2 (04-15-22 @ 04:30)       ROS  unable to obtain history secondary to patient's mental status and/or sedation     VITALS:  T(F): 98, Max: 98.4 (22 @ 16:00)  HR: 85  BP: 100/56  RR: 24Vital Signs Last 24 Hrs  T(C): 36.7 (15 Apr 2022 07:00), Max: 36.9 (2022 16:00)  T(F): 98 (15 Apr 2022 07:00), Max: 98.4 (2022 16:00)  HR: 85 (15 Apr 2022 13:00) (76 - 85)  BP: 100/56 (15 Apr 2022 13:00) (75/49 - 110/62)  BP(mean): 73 (15 Apr 2022 13:00) (58 - 81)  RR: 24 (15 Apr 2022 13:00) (19 - 27)  SpO2: 97% (15 Apr 2022 12:00) (96% - 100%)    PHYSICAL EXAM:  Gen: chronically ill appearing   HEENT: Normocephalic, atraumatic  Neck: supple, no lymphadenopathy  CV: Regular rate & regular rhythm  Lungs: decreased BS at bases, no fremitus  Abdomen: Soft, BS present  Ext: Warm, well perfused  Neuro: non focal, awake  Skin: no rash, no erythema  Lines: no phlebitis     FH: Non-contributory  Social Hx: Non-contributory    TESTS & MEASUREMENTS:                        10.1   13 )-----------( 247      ( 15 Apr 2022 04:30 )             33.7     04-15    150<H>  |  119<H>  |  36<H>  ----------------------------<  211<H>  3.8   |  20  |  1.2    Ca    8.1<L>      15 Apr 2022 12:50  Phos  1.5     04-15  Mg     1.8     04-15    TPro  5.9<L>  /  Alb  2.3<L>  /  TBili  0.7  /  DBili  x   /  AST  34  /  ALT  20  /  AlkPhos  144<H>  04-15      LIVER FUNCTIONS - ( 15 Apr 2022 04:30 )  Alb: 2.3 g/dL / Pro: 5.9 g/dL / ALK PHOS: 144 U/L / ALT: 20 U/L / AST: 34 U/L / GGT: x           Urinalysis Basic - ( 2022 17:55 )    Color: Yellow / Appearance: Clear / S.030 / pH: x  Gluc: x / Ketone: Large  / Bili: Small / Urobili: <2 mg/dL   Blood: x / Protein: 100 mg/dL / Nitrite: Negative   Leuk Esterase: Negative / RBC: >50 /HPF / WBC 10-25 /HPF   Sq Epi: x / Non Sq Epi: Few / Bacteria: Moderate        Culture - Urine (collected 22 @ 17:55)  Source: Clean Catch Clean Catch (Midstream)  Final Report (22 @ 22:58):    <10,000 CFU/mL Normal Urogenital Pinky    Culture - Blood (collected 22 @ 13:14)  Source: .Blood Blood-Peripheral  Preliminary Report (22 @ 22:01):    No growth to date.    Culture - Blood (collected 22 @ 13:14)  Source: .Blood Blood-Peripheral  Preliminary Report (22 @ 22:01):    No growth to date.        Lactate, Blood: 3.1 mmol/L (04-15-22 @ 12:13)  Lactate, Blood: 3.1 mmol/L (22 @ 13:00)  Lactate, Blood: 5.1 mmol/L (22 @ 01:49)  Lactate, Blood: 3.4 mmol/L (22 @ 22:03)  Blood Gas Venous - Lactate: 2.90 mmol/L (22 @ 19:07)  Blood Gas Venous - Lactate: 4.00 mmol/L (22 @ 13:14)      INFECTIOUS DISEASES TESTING  Procalcitonin, Serum: 0.22 (22 @ 15:56)  COVID-19 PCR: NotDetec (22 @ 18:49)  COVID-19 PCR: NotDetec (22 @ 16:34)  Rapid RVP Result: NotDetec (10-12-21 @ 20:16)  COVID-19 PCR: NotDetec (21 @ 13:37)  strept    INFLAMMATORY MARKERS      RADIOLOGY & ADDITIONAL TESTS:  I have personally reviewed the last available Chest xray  CXR  Xray Chest 1 View- PORTABLE-Urgent:   ACC: [id number] EXAM:  XR CHEST PORTABLE URGENT 1V                          PROCEDURE DATE:  2022          INTERPRETATION:  Clinical History / Reason for exam: Sepsis.    Comparison : Chest radiograph 3/25/2022.    Technique/Positioning: Singlefrontal view the chest.    Findings:    Support devices: None.    Cardiac/mediastinum/hilum: Stable cardiomediastinal silhouette    Lung parenchyma/Pleura: Within normal limits.    Skeleton/soft tissues: Stable.    Impression:    No radiographic evidence of acute cardiopulmonary disease.        --- End of Report ---            ALEIDA DESAI MD; Attending Radiologist  This document has been electronically signed. 2022  4:26PM (22 @ 14:16)      CT  CT Abdomen and Pelvis No Cont:   ACC: [id number] EXAM:  CT ABDOMEN AND PELVIS                          PROCEDURE DATE:  2022          INTERPRETATION:  CLINICAL STATEMENT: Leukocytosis    TECHNIQUE: Contiguous axial CT images were obtained from the lower chest   to the pubic symphysis without intravenous contrast.  Oral contrast was   not administered.  Reformatted images in the coronal and sagittal planes   were acquired.    COMPARISON CT: 3/25/2021    OTHER STUDIES USED FOR CORRELATION: None.      FINDINGS:  Patient's arms within the field of view result in streak artifact which   limits evaluation.    LOWER CHEST: Left basilar subsegmental atelectasis. Coronary artery   calcifications.    HEPATOBILIARY: Scattered calcified granuloma. Otherwise limited in   evaluationwithout contrast and due to streak artifact. Gallbladder is   present.    SPLEEN: Unremarkable.    PANCREAS: Unremarkable.    ADRENAL GLANDS: Unremarkable.    KIDNEYS: No hydronephrosis.    ABDOMINOPELVIC NODES: No definite lymphadenopathy.    PELVICORGANS: Urinary bladder is decompressed with Rothman catheter.   Intraluminal air is likely secondary to recent catheterization.    PERITONEUM/MESENTERY/BOWEL: No bowel obstruction. No free fluid or free   air.    BONES/SOFT TISSUES: Diffuse osteopenia. 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 Patient positioning limits   evaluation of detailed bony structures. No acute fracture is seen.   Scoliotic and degenerative changes along the vertebral column.    OTHER: Moderate atherosclerosis      IMPRESSION:  1.  Technically limited exam due to lack of intravenous contrast and   patient's arms within the field of view with resulting streak artifact.  2.  Decompressed urinary bladder with intraluminal air likely secondary   to recent catheterization.  3.  No acute pathology identified on this unenhanced exam.  4.  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    --- End of Report ---            ALEIDA DESAI MD; Attending Radiologist  This document has been electronically signed. 2022  6:21PM (22 @ 16:47)      CARDIOLOGY TESTING  12 Lead ECG:   Ventricular Rate 108 BPM    Atrial Rate 108 BPM    P-R Interval 114 ms    QRS Duration 112 ms    Q-T Interval 374 ms    QTC Calculation(Bazett) 501 ms    P Axis 76 degrees    R Axis -16 degrees    T Axis 136 degrees    Diagnosis Line Sinus tachycardia  Right bundle branch block  T wave abnormality, consider lateral ischemia  Abnormal ECG    Confirmed by DEX JORGE MD ([id number]) on 2022 11:18:50 PM (22 @ 20:45)  12 Lead ECG:   Ventricular Rate 122 BPM    Atrial Rate 122 BPM    P-R Interval 126 ms    QRS Duration 116 ms    Q-T Interval 388 ms    QTC Calculation(Bazett) 552 ms    P Axis 29 degrees    R Axis 73 degrees    T Axis 118 degrees    Diagnosis Line Sinus tachycardia  Right bundle branch block  Lateral infarct , age undetermined  Abnormal ECG    Confirmed by DEX JORGE MD ([id number]) on 2022 11:18:46 PM (22 @ 14:33)      MEDICATIONS  chlorhexidine 4% Liquid 1 Topical daily  dextrose 5%. 1000 IV Continuous <Continuous>  dextrose 5%. 1000 IV Continuous <Continuous>  dextrose 5%. 1000 IV Continuous <Continuous>  dextrose 50% Injectable 25 IV Push once  dextrose 50% Injectable 12.5 IV Push once  dextrose 50% Injectable 25 IV Push once  donepezil 5 Oral at bedtime  glucagon  Injectable 1 IntraMuscular once  heparin   Injectable 5000 SubCutaneous every 12 hours  insulin glargine Injectable (LANTUS) 15 SubCutaneous every morning  insulin lispro (ADMELOG) corrective regimen sliding scale  SubCutaneous three times a day before meals  insulin lispro Injectable (ADMELOG) 5 SubCutaneous every 8 hours  pantoprazole   Suspension 40 Oral before breakfast  piperacillin/tazobactam IVPB.. 3.375 IV Intermittent every 12 hours  polyethylene glycol 3350 17 Oral daily      WEIGHT  Weight (kg): 44.4 (22 @ 00:39)  Creatinine, Serum: 1.2 mg/dL (04-15-22 @ 12:50)  Creatinine, Serum: 1.2 mg/dL (04-15-22 @ 04:30)  Creatinine, Serum: 1.2 mg/dL (04-15-22 @ 02:05)  Creatinine, Serum: 1.4 mg/dL (22 @ 20:00)  Creatinine, Serum: 2.3 mg/dL (22 @ 15:56)      ANTIBIOTICS:  piperacillin/tazobactam IVPB.. 3.375 Gram(s) IV Intermittent every 12 hours      All available historical records have been reviewed

## 2022-04-15 NOTE — CONSULT NOTE ADULT - ASSESSMENT
88 y/o male with a PMH of uncontrolled DM, prostate ca in remission, and late stage alzheimer's presents to the ED for evaluation of failure to thrive admitted for DKA/starvation ketoacidosis. GI was consulted for PEG tube     #)PEG Tube evaluation   -Indication failure to thrive   -Speech and swallow failed 4/14  -prior abdominal surgeries  -No Fever  -No Antiplatelet/anticoagulation    Recs:   Please correct electrolytes (sodium 151)  will consider for PEG once medically stable likely next week

## 2022-04-15 NOTE — PROGRESS NOTE ADULT - SUBJECTIVE AND OBJECTIVE BOX
GERMANIA PAGAN MRN#: [id number]  CODE STATUS: FULL CODE     SUBJECTIVE   Chief complaint:   Currently admitted to medicine with the primary diagnosis of ADULT FAILURE TO THRIVE; HYPEROSMOLAR HYPERGLYCEMIC STATE (HHS)      Today is hospital day 2d,   INTERVAL HPI/OVERNIGHT EVENTS: NGT placed, feeds started. Transitioned from insulin gtt to SQI. Hypernatremic.    This morning, he is laying in bed. Unable to complete history 2/2 mental status.     Urinating and stooling appropriately.    Present Today:           Rothman Catheter ()No/ (x)Yes?   Indication: retention?            Central Line (x)No/ ()Yes?  Indication:          IV Fluids ()No/ (x)Yes?  Indication: hypernatremia     OBJECTIVE  PAST MEDICAL & SURGICAL HISTORY  Prostate cancer    Dementia    Diabetes    No significant past surgical history      ALLERGIES:  No Known Allergies    HOME MEDICATIONS:  Home Medications:  DONEPEZIL HCL 5 MG TABLET: 1 tab(s) orally once a day (26 Mar 2022 00:46)  Haldol 0.5 mg oral tablet: 1 tab(s) orally once a day, As Needed (02 May 2021 15:39)  Melatonin 5 mg oral tablet: 1 tab(s) orally once a day (at bedtime) (04 May 2021 16:06)    MEDICATIONS:  STANDING MEDICATIONS  MEDICATIONS  (STANDING):  chlorhexidine 4% Liquid 1 Application(s) Topical daily  dextrose 5%. 1000 milliLiter(s) (100 mL/Hr) IV Continuous <Continuous>  dextrose 5%. 1000 milliLiter(s) (50 mL/Hr) IV Continuous <Continuous>  dextrose 5%. 1000 milliLiter(s) (70 mL/Hr) IV Continuous <Continuous>  dextrose 50% Injectable 25 Gram(s) IV Push once  dextrose 50% Injectable 12.5 Gram(s) IV Push once  dextrose 50% Injectable 25 Gram(s) IV Push once  donepezil 5 milliGRAM(s) Oral at bedtime  glucagon  Injectable 1 milliGRAM(s) IntraMuscular once  heparin   Injectable 5000 Unit(s) SubCutaneous every 12 hours  insulin glargine Injectable (LANTUS) 15 Unit(s) SubCutaneous every morning  insulin lispro (ADMELOG) corrective regimen sliding scale   SubCutaneous three times a day before meals  insulin lispro Injectable (ADMELOG) 3 Unit(s) SubCutaneous every 6 hours  pantoprazole   Suspension 40 milliGRAM(s) Oral before breakfast  piperacillin/tazobactam IVPB.. 3.375 Gram(s) IV Intermittent every 12 hours  polyethylene glycol 3350 17 Gram(s) Oral daily  potassium phosphate IVPB 30 milliMole(s) IV Intermittent once    PRN MEDICATIONS  MEDICATIONS  (PRN):  dextrose Oral Gel 15 Gram(s) Oral once PRN Blood Glucose LESS THAN 70 milliGRAM(s)/deciliter  haloperidol     Tablet 0.5 milliGRAM(s) Oral at bedtime PRN Agitation/Confusion      VITAL SIGNS  T(F): 98 (04-15 @ 07:00), Max: 99.1 ( @ 12:00)  HR: 80 (04-15 @ 07:00) (76 - 97)  BP: 96/55 (04-15 @ 07:00) (75/49 - 126/62)  RR: 22 (04-15 @ 07:00) (21 - 27)  SpO2: 99% (04-15 @ 07:00) (97% - 100%)    LABS:                        9.9    11.39 )-----------( 272      ( 2022 04:43 )             32.9     04-15    151<H>  |  116<H>  |  37<H>  ----------------------------<  223<H>  4.1   |  21  |  1.2    Ca    8.0<L>      15 Apr 2022 04:30  Phos  1.5     04-15  Mg     1.8     -15    TPro  5.9<L>  /  Alb  2.3<L>  /  TBili  0.7  /  DBili  x   /  AST  34  /  ALT  20  /  AlkPhos  144<H>  04-15    PT/INR - ( 2022 13:14 )   PT: 13.00 sec;   INR: 1.13 ratio         PTT - ( 2022 13:14 )  PTT:33.2 sec  Urinalysis Basic - ( 2022 17:55 )    Color: Yellow / Appearance: Clear / S.030 / pH: x  Gluc: x / Ketone: Large  / Bili: Small / Urobili: <2 mg/dL   Blood: x / Protein: 100 mg/dL / Nitrite: Negative   Leuk Esterase: Negative / RBC: >50 /HPF / WBC 10-25 /HPF   Sq Epi: x / Non Sq Epi: Few / Bacteria: Moderate        CARDIAC MARKERS ( 15 Apr 2022 04:30 )  x     / 0.07 ng/mL / x     / x     / x      CARDIAC MARKERS ( 2022 15:56 )  x     / 0.08 ng/mL / 60 U/L / x     / x      CARDIAC MARKERS ( 2022 22:03 )  x     / x     / 305 U/L / x     / x            Culture - Urine (collected 2022 17:55)  Source: Clean Catch Clean Catch (Midstream)  Final Report:    <10,000 CFU/mL Normal Urogenital Pinky    Culture - Blood (collected 2022 13:14)  Source: .Blood Blood-Peripheral  Preliminary Report:    No growth to date.    Culture - Blood (collected 2022 13:14)  Source: .Blood Blood-Peripheral  Preliminary Report:    No growth to date.        RADIOLOGY:   Reviewed  CXR negative  CT A/P - no acute pathology. Chronic comminuted nonunited impacted R femoral neck fx.      PHYSICAL EXAM:  General: Laying in bed. Contracted. NAD. Obtunded   HEENT: Atraumatic. Normocephalic. NGT.  Cardiac: Normal S1, S2. RRR. No murmurs, rubs, or gallops.  Pulm: CTA b/l   GI: Soft, nontender, nondistended.   Ext: 2+ pulses. Moving all 4 extremities. No edema, cyanosis. b/l LE contracted.  Neuro: obtunded  Skin: stage 2 sacral ulcer; R foot eschar

## 2022-04-15 NOTE — ADVANCED PRACTICE NURSE CONSULT - ASSESSMENT
88 y/o male with a PMH of uncontrolled DM, prostate ca in remission, and late stage alzheimer's presents to the ED (4/13) for evaluation of failure to thrive. Pt is a poor historian, and [place]an speaking. Per pt's daughter Isabelle- pt lives home and has not been eating or drinking since he left the hospital in march 2022 ( few weeks ago). She reports that the pt was recently admitted 03/25-03/29 for hypoglycemia and pathological hip fracture. she reports pt is at baseline, and baseline is confused. pt daughter reports she placed a flores in him at home yesterday and she is a nurse.  Currently admitted to medicine with the primary diagnosis of ADULT FAILURE TO THRIVE; HYPEROSMOLAR HYPERGLYCEMIC STATE (HHS); today is hospital day -3d; in CCU, being managed for DKA, resolved; HAGMA, resolved; Lactic acidosis, improving; ?Starvation ketoacidosis, resolving; Severe alzheimer disease; MANDA, resolving;  Hypernatremia; R foot eschar; positive UA.     Received patient in CCU, laying supine in bed, turned to left side, HOB elevated 30 degrees. Pt awake, not attentive, non-verbal bedsides occasional moaning, severely contracted BLEs, primary RN Bj at bedside, made aware of purpose of WOCN visit, agreeable to consult. Informed by RN, patient downgarded to medicine &  awaiting transport to med/surg unit. With assistance from RN, turned patient completely to left side for skin assessment.     Foam Allevyn dressing to coccyx in place, dressing removed.      Type of wound: Stage 2 pressure injury; POA  Location: coccyx -b/l buttock  Wound measurements: 2 wounds in close proximity to each other & measured together at 2cm x 5cm x 0.1cm  Tunneling/Undermining: none  Wound bed: presenting as ruptured blisters w/ dark pin tissue at wound base  Wound edges: attached, flush, irregular   Periwound: blanchable erythema   Wound exudate: scant amount of serosanguinous drainage present on removed dressing   Wound odor: none  Induration, erythema, warmth: none  Wound pain: no s/s pain present on assessment     Necrotic ulcerations x 2 to right lateral foot-being managed by podiatry MD team.   Left heel & foot intact.    Multiple scabbed over ulcerations presenting as scratches to BLEs.     Patient bedbound, very limited mobility in bed-immobile, severely contracted to BLEs, + flores, incontinent of mushy stool during WOCN visit. Receiving TF via NGT.

## 2022-04-15 NOTE — CONSULT NOTE ADULT - SUBJECTIVE AND OBJECTIVE BOX
GERMANIA PAGAN [id number]    88 y/o male with a PMH of uncontrolled DM, prostate ca in remission, and late stage alzheimer's presents to the ED for evaluation of failure to thrive. pt is a poor historian, and Maltese speaking. I spoke with pt daughter Isabelle who reports pt lives home and has not been eating or drinking since he left the hospital in 2022 ( few weeks ago). She reports that the pt was recently admitted - for hypoglycemia and pathological hip fracture. she reports pt is at baseline, and baseline is confused. pt daughter reports she placed a flores in him at home yesterday and she is a nurse.  (2022 20:30)  Pt was found to be in DKA/HHS w/ ?starvation ketoacidosis. Pt was started on insulin gtt. Pt was also found to have hypernatremia. Per daughter's wishes, NGT was placed  and tube feeds were initiated. Pt was transitioned to SQI this morning. Pt's IVF were changed to D5@70/hr this morning and free water flushes were added. Please follow-up BMP ATC to monitor Na to ensure no overcorrection. Consider d/c D5 when Na reach 145. GOC discussed with daughter, goal is for pt to receive PEG tube and for daughter to take pt home. Pt presented with flores from home, TOV initiated today.     PAST MEDICAL & SURGICAL HISTORY:  Prostate cancer  Dementia  Diabetes  recent hip fracture  No Known Allergies    Vital Signs Last 24 Hrs  T(C): 36.7 (15 Apr 2022 07:00), Max: 37.3 (2022 12:00)  T(F): 98 (15 Apr 2022 07:00), Max: 99.1 (2022 12:00)  HR: 83 (15 Apr 2022 10:00) (76 - 97)  BP: 110/62 (15 Apr 2022 10:00) (75/49 - 126/62)  BP(mean): 79 (15 Apr 2022 10:00) (58 - 83)  RR: 23 (15 Apr 2022 10:00) (21 - 27)  SpO2: 96% (15 Apr 2022 10:00) (96% - 100%)  Drug Dosing Weight  Height (cm): 147.3 (2022 00:39)  Weight (kg): 44.4 (2022 00:39)  BMI (kg/m2): 20.5 (2022 00:39)  BSA (m2): 1.34 (2022 00:39)    GENERAL: NAD, awake but nonverbal and not interactive  skin turgor fair, pt pale, anicteric sclerae  NG feeding tube in nare, pt lying curled in bed, not upright  HEART: Regular rate and rhythm  ABDOMEN: Soft, Nontender, Nondistended;   EXTREMITIES:  No clubbing, cyanosis, or edema, LE contracted, marked loss of LBM and fat mass c/w age and disuse, as well as evidence of wt loss  + facial and infraclavicular wasting, perineal wasting on exam    MEDICATIONS  (STANDING):  chlorhexidine 4% Liquid 1 Application(s) Topical daily  donepezil 5 milliGRAM(s) Oral at bedtime  glucagon  Injectable 1 milliGRAM(s) IntraMuscular once  heparin   Injectable 5000 Unit(s) SubCutaneous every 12 hours  insulin glargine Injectable (LANTUS) 15 Unit(s) SubCutaneous every morning  insulin lispro (ADMELOG) corrective regimen sliding scale   SubCutaneous three times a day before meals  insulin lispro Injectable (ADMELOG) 5 Unit(s) SubCutaneous every 8 hours  pantoprazole   Suspension 40 milliGRAM(s) Oral before breakfast  piperacillin/tazobactam IVPB.. 3.375 Gram(s) IV Intermittent every 12 hours  polyethylene glycol 3350 17 Gram(s) Oral daily    MEDICATIONS  (PRN):  dextrose Oral Gel 15 Gram(s) Oral once PRN Blood Glucose LESS THAN 70 milliGRAM(s)/deciliter  haloperidol     Tablet 0.5 milliGRAM(s) Oral at bedtime PRN Agitation/Confusion    POCT Blood Glucose.: 195 mg/dL (15 Apr 2022 10:50)  POCT Blood Glucose.: 219 mg/dL (15 Apr 2022 06:59)  POCT Blood Glucose.: 199 mg/dL (15 Apr 2022 05:49)  POCT Blood Glucose.: 167 mg/dL (15 Apr 2022 02:32)  POCT Blood Glucose.: 54 mg/dL (15 Apr 2022 01:17)  POCT Blood Glucose.: 51 mg/dL (15 Apr 2022 01:15)  POCT Blood Glucose.: 65 mg/dL (15 Apr 2022 00:18)  POCT Blood Glucose.: 125 mg/dL (2022 22:35)  POCT Blood Glucose.: 62 mg/dL (2022 21:47)  POCT Blood Glucose.: 265 mg/dL (2022 17:18)  POCT Blood Glucose.: 304 mg/dL (2022 15:35)  POCT Blood Glucose.: 287 mg/dL (2022 13:09)  POCT Blood Glucose.: 281 mg/dL (2022 11:30)                          9.9    11.39 )-----------( 272      ( 2022 04:43 )             32.9         04-15    151<H>  |  116<H>  |  37<H>  ----------------------------<  223<H>  4.1   |  21  |  1.2      Calcium, Total Serum: 8.0 mg/dL (04-15-22 @ 04:30)    Phosphorus Level, Serum: 1.5 mg/dL (04.15.22 @ 04:30)   LFTs:             5.9  | 0.7  | 34       ------------------[144     ( 15 Apr 2022 04:30 )  2.3  | x    | 20          Coags:     13.00  ----< 1.13    ( 2022 13:14 )     33.2      Urinalysis Basic - ( 2022 17:55 )    Color: Yellow / Appearance: Clear / S.030 / pH: x  Gluc: x / Ketone: Large  / Bili: Small / Urobili: <2 mg/dL   Blood: x / Protein: 100 mg/dL / Nitrite: Negative   Leuk Esterase: Negative / RBC: >50 /HPF / WBC 10-25 /HPF   Sq Epi: x / Non Sq Epi: Few / Bacteria: Moderate    Culture - Urine (collected 2022 17:55)  Source: Clean Catch Clean Catch (Midstream)  Final Report (2022 22:58):    <10,000 CFU/mL Normal Urogenital Pinky    Culture - Blood (collected 2022 13:14)  Source: .Blood Blood-Peripheral  Preliminary Report (2022 22:01):    No growth to date.    Culture - Blood (collected 2022 13:14)  Source: .Blood Blood-Peripheral  Preliminary Report (2022 22:01):    No growth to date.

## 2022-04-15 NOTE — CONSULT NOTE ADULT - SUBJECTIVE AND OBJECTIVE BOX
Gastroenterology Consultation:    Patient is a 87y old  Male who presents with a chief complaint of     Admitted on: 04-13-22  HPI:  The patient is nonverbal, AAO*0, history mainly obtained from the charts.   Tried calling Isabelle: [phone] no answer, left a voice mail, will re-attempt, family may be interested in GOC discussion, DNR/DNI status.     86 y/o male with a PMH of uncontrolled DM, prostate ca in remission, and late stage alzheimer's presents to the ED for evaluation of failure to thrive. pt is a poor historian, and Burmese speaking. I spoke with pt daughter Isabelle who reports pt lives home and has not been eating or drinking since he left the hospital in march 2022 ( few weeks ago). She reports that the pt was recently admitted 03/25-03/29 for hypoglycemia and pathological hip fracture. she reports pt is at baseline, and baseline is confused. pt daughter reports she placed a flores in him at home yesterday and she is a nurse.  (13 Apr 2022 20:30)      Prior EGD:  Prior Colonoscopy:      PAST MEDICAL & SURGICAL HISTORY:  Prostate cancer    Dementia    Diabetes    No significant past surgical history        FAMILY HISTORY:      Social History:  Tobacco:  Alcohol:  Drugs:    Home Medications:  DONEPEZIL HCL 5 MG TABLET: 1 tab(s) orally once a day (26 Mar 2022 00:46)  Haldol 0.5 mg oral tablet: 1 tab(s) orally once a day, As Needed (02 May 2021 15:39)  Melatonin 5 mg oral tablet: 1 tab(s) orally once a day (at bedtime) (04 May 2021 16:06)    MEDICATIONS  (STANDING):  chlorhexidine 4% Liquid 1 Application(s) Topical daily  dextrose 5%. 1000 milliLiter(s) (100 mL/Hr) IV Continuous <Continuous>  dextrose 5%. 1000 milliLiter(s) (50 mL/Hr) IV Continuous <Continuous>  dextrose 5%. 1000 milliLiter(s) (70 mL/Hr) IV Continuous <Continuous>  dextrose 50% Injectable 25 Gram(s) IV Push once  dextrose 50% Injectable 12.5 Gram(s) IV Push once  dextrose 50% Injectable 25 Gram(s) IV Push once  donepezil 5 milliGRAM(s) Oral at bedtime  glucagon  Injectable 1 milliGRAM(s) IntraMuscular once  heparin   Injectable 5000 Unit(s) SubCutaneous every 12 hours  insulin glargine Injectable (LANTUS) 15 Unit(s) SubCutaneous every morning  insulin lispro (ADMELOG) corrective regimen sliding scale   SubCutaneous three times a day before meals  insulin lispro Injectable (ADMELOG) 5 Unit(s) SubCutaneous every 8 hours  pantoprazole   Suspension 40 milliGRAM(s) Oral before breakfast  piperacillin/tazobactam IVPB.. 3.375 Gram(s) IV Intermittent every 12 hours  polyethylene glycol 3350 17 Gram(s) Oral daily    MEDICATIONS  (PRN):  dextrose Oral Gel 15 Gram(s) Oral once PRN Blood Glucose LESS THAN 70 milliGRAM(s)/deciliter  haloperidol     Tablet 0.5 milliGRAM(s) Oral at bedtime PRN Agitation/Confusion      Allergies  No Known Allergies      Review of Systems:   Constitutional:  No Fever, No Chills  ENT/Mouth:  No Hearing Changes,  No Difficulty Swallowing  Eyes:  No Eye Pain, No Vision Changes  Cardiovascular:  No Chest Pain, No Palpitations  Respiratory:  No Cough, No Dyspnea  Gastrointestinal:  As described in HPI  Musculoskeletal:  No Joint Swelling, No Back Pain  Skin:  No Skin Lesions, No Jaundice  Neuro:  No Syncope, No Dizziness  Heme/Lymph:  No Bruising, No Bleeding.          Physical Examination:  T(C): 36.7 (04-15-22 @ 07:00), Max: 37.3 (04-14-22 @ 12:00)  HR: 83 (04-15-22 @ 10:00) (76 - 97)  BP: 110/62 (04-15-22 @ 10:00) (75/49 - 126/62)  RR: 23 (04-15-22 @ 10:00) (21 - 27)  SpO2: 96% (04-15-22 @ 10:00) (96% - 100%)      04-13-22 @ 07:01  -  04-14-22 @ 07:00  --------------------------------------------------------  IN: 1000 mL / OUT: 530 mL / NET: 470 mL    04-14-22 @ 07:01  -  04-15-22 @ 07:00  --------------------------------------------------------  IN: 3772 mL / OUT: 1375 mL / NET: 2397 mL        Constitutional: No acute distress.  Eyes:. Conjunctivae are clear, Sclera is non-icteric.  Ears Nose and Throat: The external ears are normal appearing,  Oral mucosa is pink and moist.  Respiratory:  No signs of respiratory distress. Lung sounds are clear bilaterally.  Cardiovascular:  S1 S2, Regular rate and rhythm.  GI: Abdomen is soft, symmetric, and non-tender without distention. There are no visible lesions or scars. Bowel sounds are present and normoactive in all four quadrants. No masses, hepatomegaly, or splenomegaly are noted.   Neuro: No Tremor, No involuntary movements  Skin: No rashes, No Jaundice.          Data:                        9.9    11.39 )-----------( 272      ( 14 Apr 2022 04:43 )             32.9     Hgb Trend:  9.9  04-14-22 @ 04:43  11.8  04-13-22 @ 13:14        04-15    151<H>  |  116<H>  |  37<H>  ----------------------------<  223<H>  4.1   |  21  |  1.2    Ca    8.0<L>      15 Apr 2022 04:30  Phos  1.5     04-15  Mg     1.8     04-15    TPro  5.9<L>  /  Alb  2.3<L>  /  TBili  0.7  /  DBili  x   /  AST  34  /  ALT  20  /  AlkPhos  144<H>  04-15    Liver panel trend:  TBili 0.7   /   AST 34   /   ALT 20   /   AlkP 144   /   Tptn 5.9   /   Alb 2.3    /   DBili --      04-15  TBili 0.9   /   AST 12   /   ALT 16   /   AlkP 155   /   Tptn 7.3   /   Alb 2.8    /   DBili --      04-13      PT/INR - ( 13 Apr 2022 13:14 )   PT: 13.00 sec;   INR: 1.13 ratio         PTT - ( 13 Apr 2022 13:14 )  PTT:33.2 sec    Culture - Urine (collected 13 Apr 2022 17:55)  Source: Clean Catch Clean Catch (Midstream)  Final Report (14 Apr 2022 22:58):    <10,000 CFU/mL Normal Urogenital Pinky    Culture - Blood (collected 13 Apr 2022 13:14)  Source: .Blood Blood-Peripheral  Preliminary Report (14 Apr 2022 22:01):    No growth to date.    Culture - Blood (collected 13 Apr 2022 13:14)  Source: .Blood Blood-Peripheral  Preliminary Report (14 Apr 2022 22:01):    No growth to date.          Radiology:       Gastroenterology Consultation:    Patient is a 87y old  Male who presents with a chief complaint of FTT    Admitted on: 04-13-22  HPI:  The patient is nonverbal, AAO*0, history mainly obtained from the charts.   Tried calling Isabelle: [phone] no answer, left a voice mail, will re-attempt, family may be interested in GOC discussion, DNR/DNI status.     86 y/o male with a PMH of uncontrolled DM, prostate ca in remission, and late stage alzheimer's presents to the ED for evaluation of failure to thrive. pt is a poor historian, and Icelandic speaking. I spoke with pt daughter Isabelle who reports pt lives home and has not been eating or drinking since he left the hospital in march 2022 ( few weeks ago). She reports that the pt was recently admitted 03/25-03/29 for hypoglycemia and pathological hip fracture. she reports pt is at baseline, and baseline is confused. pt daughter reports she placed a flores in him at home yesterday and she is a nurse.  (13 Apr 2022 20:30)      Prior EGD: none on chart  Prior Colonoscopy: none on chart       PAST MEDICAL & SURGICAL HISTORY:  Prostate cancer    Dementia    Diabetes    No significant past surgical history        FAMILY HISTORY:  no family h/o GI cancers     Social History:  Tobacco: N   Alcohol: N  Drugs: N    Home Medications:  DONEPEZIL HCL 5 MG TABLET: 1 tab(s) orally once a day (26 Mar 2022 00:46)  Haldol 0.5 mg oral tablet: 1 tab(s) orally once a day, As Needed (02 May 2021 15:39)  Melatonin 5 mg oral tablet: 1 tab(s) orally once a day (at bedtime) (04 May 2021 16:06)    MEDICATIONS  (STANDING):  chlorhexidine 4% Liquid 1 Application(s) Topical daily  dextrose 5%. 1000 milliLiter(s) (100 mL/Hr) IV Continuous <Continuous>  dextrose 5%. 1000 milliLiter(s) (50 mL/Hr) IV Continuous <Continuous>  dextrose 5%. 1000 milliLiter(s) (70 mL/Hr) IV Continuous <Continuous>  dextrose 50% Injectable 25 Gram(s) IV Push once  dextrose 50% Injectable 12.5 Gram(s) IV Push once  dextrose 50% Injectable 25 Gram(s) IV Push once  donepezil 5 milliGRAM(s) Oral at bedtime  glucagon  Injectable 1 milliGRAM(s) IntraMuscular once  heparin   Injectable 5000 Unit(s) SubCutaneous every 12 hours  insulin glargine Injectable (LANTUS) 15 Unit(s) SubCutaneous every morning  insulin lispro (ADMELOG) corrective regimen sliding scale   SubCutaneous three times a day before meals  insulin lispro Injectable (ADMELOG) 5 Unit(s) SubCutaneous every 8 hours  pantoprazole   Suspension 40 milliGRAM(s) Oral before breakfast  piperacillin/tazobactam IVPB.. 3.375 Gram(s) IV Intermittent every 12 hours  polyethylene glycol 3350 17 Gram(s) Oral daily    MEDICATIONS  (PRN):  dextrose Oral Gel 15 Gram(s) Oral once PRN Blood Glucose LESS THAN 70 milliGRAM(s)/deciliter  haloperidol     Tablet 0.5 milliGRAM(s) Oral at bedtime PRN Agitation/Confusion      Allergies  No Known Allergies      Review of Systems:   couldn't obtain     Physical Examination:  T(C): 36.7 (04-15-22 @ 07:00), Max: 37.3 (04-14-22 @ 12:00)  HR: 83 (04-15-22 @ 10:00) (76 - 97)  BP: 110/62 (04-15-22 @ 10:00) (75/49 - 126/62)  RR: 23 (04-15-22 @ 10:00) (21 - 27)  SpO2: 96% (04-15-22 @ 10:00) (96% - 100%)      04-13-22 @ 07:01  -  04-14-22 @ 07:00  --------------------------------------------------------  IN: 1000 mL / OUT: 530 mL / NET: 470 mL    04-14-22 @ 07:01  -  04-15-22 @ 07:00  --------------------------------------------------------  IN: 3772 mL / OUT: 1375 mL / NET: 2397 mL        Constitutional: No acute distress.  Eyes:. Conjunctivae are clear, Sclera is non-icteric.  Ears Nose and Throat: The external ears are normal appearing,  Oral mucosa is pink and moist.  Respiratory:  No signs of respiratory distress. Lung sounds are clear bilaterally.  Cardiovascular:  S1 S2, Regular rate and rhythm.  GI: Abdomen is soft, symmetric, and non-tender without distention.    Neuro: No Tremor, No involuntary movements  Skin: No rashes, No Jaundice.          Data:                        9.9    11.39 )-----------( 272      ( 14 Apr 2022 04:43 )             32.9     Hgb Trend:  9.9  04-14-22 @ 04:43  11.8  04-13-22 @ 13:14        04-15    151<H>  |  116<H>  |  37<H>  ----------------------------<  223<H>  4.1   |  21  |  1.2    Ca    8.0<L>      15 Apr 2022 04:30  Phos  1.5     04-15  Mg     1.8     04-15    TPro  5.9<L>  /  Alb  2.3<L>  /  TBili  0.7  /  DBili  x   /  AST  34  /  ALT  20  /  AlkPhos  144<H>  04-15    Liver panel trend:  TBili 0.7   /   AST 34   /   ALT 20   /   AlkP 144   /   Tptn 5.9   /   Alb 2.3    /   DBili --      04-15  TBili 0.9   /   AST 12   /   ALT 16   /   AlkP 155   /   Tptn 7.3   /   Alb 2.8    /   DBili --      04-13      PT/INR - ( 13 Apr 2022 13:14 )   PT: 13.00 sec;   INR: 1.13 ratio         PTT - ( 13 Apr 2022 13:14 )  PTT:33.2 sec    Culture - Urine (collected 13 Apr 2022 17:55)  Source: Clean Catch Clean Catch (Midstream)  Final Report (14 Apr 2022 22:58):    <10,000 CFU/mL Normal Urogenital Pinky    Culture - Blood (collected 13 Apr 2022 13:14)  Source: .Blood Blood-Peripheral  Preliminary Report (14 Apr 2022 22:01):    No growth to date.    Culture - Blood (collected 13 Apr 2022 13:14)  Source: .Blood Blood-Peripheral  Preliminary Report (14 Apr 2022 22:01):    No growth to date.          Radiology:

## 2022-04-15 NOTE — PROGRESS NOTE ADULT - SUBJECTIVE AND OBJECTIVE BOX
Patient is a 87y old  Male who presents with a chief complaint of     Over Night Events:  Patient seen and examined.   awake in insulin 1   free water per NG     ROS:  See HPI    PHYSICAL EXAM    ICU Vital Signs Last 24 Hrs  T(C): 36.7 (2022 20:00), Max: 37.3 (2022 12:00)  T(F): 98.1 (2022 20:00), Max: 99.1 (2022 12:00)  HR: 85 (15 Apr 2022 06:00) (76 - 97)  BP: 101/58 (15 Apr 2022 06:00) (75/49 - 126/62)  BP(mean): 76 (15 Apr 2022 06:00) (58 - 83)  ABP: --  ABP(mean): --  RR: 24 (15 Apr 2022 06:00) (21 - 27)  SpO2: 98% (15 Apr 2022 06:00) (97% - 100%)      General: awake lehtargic   HEENT:  lily              Lymph Nodes: NO cervical LN   Lungs: Bilateral BS  Cardiovascular: Regular   Abdomen: Soft, Positive BS  Extremities: No clubbing   Skin: warm   Neurological: positive gag   Musculoskeletal: move all ext     I&O's Detail    2022 07:01  -  2022 07:00  --------------------------------------------------------  IN:    Lactated Ringers Bolus: 1000 mL  Total IN: 1000 mL    OUT:    Voided (mL): 530 mL  Total OUT: 530 mL    Total NET: 470 mL      2022 07:01  -  15 Apr 2022 06:53  --------------------------------------------------------  IN:    dextrose 5% + sodium chloride 0.45% w/ Additives: 1575 mL    dextrose 5% + sodium chloride 0.45% w/ Additives: 450 mL    dextrose 5% + sodium chloride 0.9% w/ Additives: 375 mL    Insulin: 15 mL    Insulin: 5 mL    Insulin: 10 mL    Insulin: 42 mL    IV PiggyBack: 300 mL    Lactated Ringers Bolus: 1000 mL  Total IN: 3772 mL    OUT:    Voided (mL): 1375 mL  Total OUT: 1375 mL    Total NET: 2397 mL          LABS:                          9.9    11.39 )-----------( 272      ( 2022 04:43 )             32.9         15 Apr 2022 04:30    151    |  116    |  37     ----------------------------<  223    4.1     |  21     |  1.2      Ca    8.0        15 Apr 2022 04:30  Phos  1.5       15 Apr 2022 04:30  Mg     1.8       15 Apr 2022 04:30    TPro  5.9    /  Alb  2.3    /  TBili  0.7    /  DBili  x      /  AST  34     /  ALT  20     /  AlkPhos  144    15 Apr 2022 04:30  Amylase x     lipase x                                                 PT/INR - ( 2022 13:14 )   PT: 13.00 sec;   INR: 1.13 ratio         PTT - ( 2022 13:14 )  PTT:33.2 sec                                       Urinalysis Basic - ( 2022 17:55 )    Color: Yellow / Appearance: Clear / S.030 / pH: x  Gluc: x / Ketone: Large  / Bili: Small / Urobili: <2 mg/dL   Blood: x / Protein: 100 mg/dL / Nitrite: Negative   Leuk Esterase: Negative / RBC: >50 /HPF / WBC 10-25 /HPF   Sq Epi: x / Non Sq Epi: Few / Bacteria: Moderate        Lactate, Blood: 3.1 mmol/L (22 @ 13:00)  Lactate, Blood: 5.1 mmol/L (22 @ 01:49)  Lactate, Blood: 3.4 mmol/L (22 @ 22:03)    CARDIAC MARKERS ( 15 Apr 2022 04:30 )  x     / 0.07 ng/mL / x     / x     / x      CARDIAC MARKERS ( 2022 15:56 )  x     / 0.08 ng/mL / 60 U/L / x     / x      CARDIAC MARKERS ( 2022 22:03 )  x     / x     / 305 U/L / x     / x                                                            Culture - Urine (collected 2022 17:55)  Source: Clean Catch Clean Catch (Midstream)  Final Report (2022 22:58):    <10,000 CFU/mL Normal Urogenital Pinky    Culture - Blood (collected 2022 13:14)  Source: .Blood Blood-Peripheral  Preliminary Report (2022 22:01):    No growth to date.    Culture - Blood (collected 2022 13:14)  Source: .Blood Blood-Peripheral  Preliminary Report (2022 22:01):    No growth to date.                                                                                           MEDICATIONS  (STANDING):  chlorhexidine 4% Liquid 1 Application(s) Topical daily  dextrose 5% + sodium chloride 0.45% with potassium chloride 20 mEq/L 1000 milliLiter(s) (75 mL/Hr) IV Continuous <Continuous>  dextrose 5%. 1000 milliLiter(s) (100 mL/Hr) IV Continuous <Continuous>  dextrose 5%. 1000 milliLiter(s) (50 mL/Hr) IV Continuous <Continuous>  dextrose 50% Injectable 25 Gram(s) IV Push once  dextrose 50% Injectable 12.5 Gram(s) IV Push once  dextrose 50% Injectable 25 Gram(s) IV Push once  dextrose 50% Injectable 50 milliLiter(s) IV Push once  donepezil 5 milliGRAM(s) Oral at bedtime  glucagon  Injectable 1 milliGRAM(s) IntraMuscular once  heparin   Injectable 5000 Unit(s) SubCutaneous every 12 hours  insulin glargine Injectable (LANTUS) 15 Unit(s) SubCutaneous every morning  insulin lispro (ADMELOG) corrective regimen sliding scale   SubCutaneous three times a day before meals  insulin lispro Injectable (ADMELOG) 3 Unit(s) SubCutaneous three times a day before meals  insulin regular Infusion 3 Unit(s)/Hr (3 mL/Hr) IV Continuous <Continuous>  pantoprazole   Suspension 40 milliGRAM(s) Oral before breakfast  piperacillin/tazobactam IVPB.. 3.375 Gram(s) IV Intermittent every 12 hours  polyethylene glycol 3350 17 Gram(s) Oral daily  potassium phosphate IVPB 30 milliMole(s) IV Intermittent once    MEDICATIONS  (PRN):  dextrose Oral Gel 15 Gram(s) Oral once PRN Blood Glucose LESS THAN 70 milliGRAM(s)/deciliter  haloperidol     Tablet 0.5 milliGRAM(s) Oral at bedtime PRN Agitation/Confusion          Xrays:  TLC:  OG: in place   ET tube:                                                                                    no infiltrate    ECHO:  [facility] ICU:

## 2022-04-15 NOTE — CONSULT NOTE ADULT - ASSESSMENT
IMP:  - progressive dementia  - poorly controlled DM  - DKA and HAGMA on admission, improved  - progressive decline with diminished po intake for > 2 weeks  - acute hypophosphatemia    per progress notes, family wishes to pursue GT placement and enteral nutrition at home  d/w RN at bedside and with medical resident  - pt has been receiving continuous drip NG feeds but insulin changed to intermittent dosing  - prefer not to have continuous NG feeding without protected airway, outside of critical care  - change to 240 ml Glucerna 1.2 over 30-40 min q8h today  - correct hypophosphatemia ASAP  - change poc glucose testing and insulin treatments to PRE-FEED, (q8h, then)  - tomorrow, if phos wnl increase to 360 ml q8h  -  to obtain home enteral vendor in network with pt's insurance

## 2022-04-15 NOTE — PROGRESS NOTE ADULT - ASSESSMENT
ASSESSMENT  88 y/o male with a PMH of uncontrolled DM, prostate ca in remission, and late stage alzheimer's presents to the ED for evaluation of failure to thrive. ID consulted for LE ulcer    IMPRESSION  #SIRS on admission P>90 WBC 16, ruled out severe sepsis, lactic acidosis    4/13 Blood & Urine cxs NGTD     UA WBC 10-25    CXR no PNA    10/2021 e. faecalis  #LE dry gangrene - no infection  #DM with DKA  #Left basilar subsegmental atelectasis.  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  #QTC Calculation(Bazett) 501 ms  #MANDA  Creatinine, Serum: 1.8 (04-14-22 @ 04:43)    Weight (kg): 44.4 (04-14-22 @ 00:39)    RECOMMENDATIONS  - D/C antibiotics and monitor  - Recall ID PRN    If any questions, please call or send a message on [employer] Teams  Please continue to update ID with any pertinent new laboratory or radiographic findings  Spectra [id number]

## 2022-04-15 NOTE — CHART NOTE - NSCHARTNOTEFT_GEN_A_CORE
Resident Transfer Note    Transfer from: CCU  Transfer to: 4A floor    MEDICATIONS:  STANDING MEDICATIONS  chlorhexidine 4% Liquid 1 Application(s) Topical daily  dextrose 5%. 1000 milliLiter(s) IV Continuous <Continuous>  dextrose 5%. 1000 milliLiter(s) IV Continuous <Continuous>  dextrose 5%. 1000 milliLiter(s) IV Continuous <Continuous>  dextrose 50% Injectable 25 Gram(s) IV Push once  dextrose 50% Injectable 12.5 Gram(s) IV Push once  dextrose 50% Injectable 25 Gram(s) IV Push once  donepezil 5 milliGRAM(s) Oral at bedtime  glucagon  Injectable 1 milliGRAM(s) IntraMuscular once  heparin   Injectable 5000 Unit(s) SubCutaneous every 12 hours  insulin glargine Injectable (LANTUS) 15 Unit(s) SubCutaneous every morning  insulin lispro (ADMELOG) corrective regimen sliding scale   SubCutaneous three times a day before meals  insulin lispro Injectable (ADMELOG) 3 Unit(s) SubCutaneous every 6 hours  pantoprazole   Suspension 40 milliGRAM(s) Oral before breakfast  piperacillin/tazobactam IVPB.. 3.375 Gram(s) IV Intermittent every 12 hours  polyethylene glycol 3350 17 Gram(s) Oral daily    PRN MEDICATIONS  dextrose Oral Gel 15 Gram(s) Oral once PRN  haloperidol     Tablet 0.5 milliGRAM(s) Oral at bedtime PRN      VITAL SIGNS: Last 24 Hours  T(C): 36.7 (15 Apr 2022 07:00), Max: 37.3 (2022 12:00)  T(F): 98 (15 Apr 2022 07:00), Max: 99.1 (2022 12:00)  HR: 83 (15 Apr 2022 10:00) (76 - 97)  BP: 110/62 (15 Apr 2022 10:00) (75/49 - 126/62)  BP(mean): 79 (15 Apr 2022 10:00) (58 - 83)  RR: 23 (15 Apr 2022 10:00) (21 - 27)  SpO2: 96% (15 Apr 2022 10:00) (96% - 100%)    LABS:                        9.9    11.39 )-----------( 272      ( 2022 04:43 )             32.9     04-15    151<H>  |  116<H>  |  37<H>  ----------------------------<  223<H>  4.1   |  21  |  1.2    Ca    8.0<L>      15 Apr 2022 04:30  Phos  1.5     04-15  Mg     1.8     -15    TPro  5.9<L>  /  Alb  2.3<L>  /  TBili  0.7  /  DBili  x   /  AST  34  /  ALT  20  /  AlkPhos  144<H>  -15    PT/INR - ( 2022 13:14 )   PT: 13.00 sec;   INR: 1.13 ratio         PTT - ( 2022 13:14 )  PTT:33.2 sec  Urinalysis Basic - ( 2022 17:55 )    Color: Yellow / Appearance: Clear / S.030 / pH: x  Gluc: x / Ketone: Large  / Bili: Small / Urobili: <2 mg/dL   Blood: x / Protein: 100 mg/dL / Nitrite: Negative   Leuk Esterase: Negative / RBC: >50 /HPF / WBC 10-25 /HPF   Sq Epi: x / Non Sq Epi: Few / Bacteria: Moderate          Culture - Urine (collected 2022 17:55)  Source: Clean Catch Clean Catch (Midstream)  Final Report (2022 22:58):    <10,000 CFU/mL Normal Urogenital Whitney    Culture - Blood (collected 2022 13:14)  Source: .Blood Blood-Peripheral  Preliminary Report (2022 22:01):    No growth to date.    Culture - Blood (collected 2022 13:14)  Source: .Blood Blood-Peripheral  Preliminary Report (2022 22:01):    No growth to date.      CARDIAC MARKERS ( 15 Apr 2022 04:30 )  x     / 0.07 ng/mL / x     / x     / x      CARDIAC MARKERS ( 2022 15:56 )  x     / 0.08 ng/mL / 60 U/L / x     / x      CARDIAC MARKERS ( 2022 22:03 )  x     / x     / 305 U/L / x     / x              HOSPITAL COURSE:  The patient is nonverbal, AAO*0, history mainly obtained from the charts.   Tried calling Isabelle: [phone] no answer, left a voice mail, will re-attempt, family may be interested in GOC discussion, DNR/DNI status.     86 y/o male with a PMH of uncontrolled DM, prostate ca in remission, and late stage alzheimer's presents to the ED for evaluation of failure to thrive. pt is a poor historian, and [place] speaking. I spoke with pt daughter Isabelle who reports pt lives home and has not been eating or drinking since he left the hospital in 2022 ( few weeks ago). She reports that the pt was recently admitted - for hypoglycemia and pathological hip fracture. she reports pt is at baseline, and baseline is confused. pt daughter reports she placed a flores in him at home yesterday and she is a nurse.    Pt was found to be in DKA/HHS w/ ?starvation ketoacidosis. Pt was started on insulin gtt. Pt was also found to have hypernatremia. Per daughter's wishes, NGT was placed  and tube feeds were initiated. Pt was transitioned to SQI this morning. Pt's IVF were changed to D5@70/hr this morning and free water flushes were added. Please follow-up BMP ATC to monitor Na to ensure no overcorrection. Consider d/c D5 when Na reach 145. GOC discussed with daughter, goal is for pt to receive PEG tube and for daughter to take pt home. Pt presented with flores from home, TOV initiated today.       ASSESSMENT & PLAN:   86 y/o male with a PMH of uncontrolled DM, prostate ca in remission, and late stage alzheimer's presents to the ED for evaluation of failure to thrive. pt is a poor historian, and [place] speaking.    # DKA, resolved  # HAGMA, resolved  # Lactic acidosis, improving  # ?Starvation ketoacidosis, resolving  - FS: 219, 199, 167, 54, 51, 65, 125, 62, 265, 304, 287, 281, 214  - on admission, acidotic, BHB 8.5, UA+ketones, glucose, , AG 34, Sosm 380 (elev)  - started on insulin gtt, D5+NS -- transitioned to SQI this morning  - pt not tolerating PO 2/2 mental status, NGT placed yesterday, feeds started  - AG now 14, bicarb now 21  - LA 4.0->2.9->3.4->5.1->3.1, rpt today  - a1c 9.8 (22)  - ->60; Trop 0.08->0.07  - FS q6h  - unclear precipitating factor  - endo c/s appreciated -- check PTH, TSH, T4    # Severe alzheimer disease  - pt nonverbal, AOx0  - lives at home with daughter  - poor PO intake over past several weeks  - pt obtunded this AM  - s/p speech eval -- NPO. NGT placed.  - GOC d/w daughter - wants PEG placed and goal to take pt home  - GI consult placed for PEG    # MANDA, resolving  - Cr 1.7 on admission, now 1.2 -- baseline ~0.7-0.9  - BUN/Cr ratio >25, suspect prerenal in setting of poor PO intake  - cont IVF    # Hypernatremia  - Na 154->151->151  - IVF with D5@70cc/hr -- dc fluids when Na reaches 145  - free water via NGT  - BMP q6h to monitor Na, correct 4-6/24h    # R foot eschar  - s/p podiatry eval - local wound care, EZ boots, WBAT, no surgical intervention  - f/u LE arterial duplex read  - check MRSA  - procal 0.22  - f/u outpatient podiatry    # Positive UA  - BCx x2 NGTD  - UCx normal whitney  - UA with mod bacteria  - s/p ID eval - zosyn (hx of E. faecalis)  - ID f/u as cultures negative, pt afebrile, mild leukocytosis    TOV today    PT/REHAB n/a  ACTIVITY: bedrest  DVT PPX: heparin   Injectable -- f/u LE duplex  GI PPX: pantoprazole  DIET: NPO w tube feeds  CODE: full  Disposition: CCU -- if no recurrence of DKA on 11AM BMP -- downgrade to floors  Pendin AM BMP; GI c/s; TOV; ID f/u; f/u Na        For Follow-Up:  - GI c/s for PEG  - TOV initiated today  - BMP ATC to monitor Na (on D5, free water flushes)  - ID f/u for ?need for abx as cx negative Resident Transfer Note    Transfer from: CCU  Transfer to: 4A floor    MEDICATIONS:  STANDING MEDICATIONS  chlorhexidine 4% Liquid 1 Application(s) Topical daily  dextrose 5%. 1000 milliLiter(s) IV Continuous <Continuous>  dextrose 5%. 1000 milliLiter(s) IV Continuous <Continuous>  dextrose 5%. 1000 milliLiter(s) IV Continuous <Continuous>  dextrose 50% Injectable 25 Gram(s) IV Push once  dextrose 50% Injectable 12.5 Gram(s) IV Push once  dextrose 50% Injectable 25 Gram(s) IV Push once  donepezil 5 milliGRAM(s) Oral at bedtime  glucagon  Injectable 1 milliGRAM(s) IntraMuscular once  heparin   Injectable 5000 Unit(s) SubCutaneous every 12 hours  insulin glargine Injectable (LANTUS) 15 Unit(s) SubCutaneous every morning  insulin lispro (ADMELOG) corrective regimen sliding scale   SubCutaneous three times a day before meals  insulin lispro Injectable (ADMELOG) 3 Unit(s) SubCutaneous every 6 hours  pantoprazole   Suspension 40 milliGRAM(s) Oral before breakfast  piperacillin/tazobactam IVPB.. 3.375 Gram(s) IV Intermittent every 12 hours  polyethylene glycol 3350 17 Gram(s) Oral daily    PRN MEDICATIONS  dextrose Oral Gel 15 Gram(s) Oral once PRN  haloperidol     Tablet 0.5 milliGRAM(s) Oral at bedtime PRN      VITAL SIGNS: Last 24 Hours  T(C): 36.7 (15 Apr 2022 07:00), Max: 37.3 (2022 12:00)  T(F): 98 (15 Apr 2022 07:00), Max: 99.1 (2022 12:00)  HR: 83 (15 Apr 2022 10:00) (76 - 97)  BP: 110/62 (15 Apr 2022 10:00) (75/49 - 126/62)  BP(mean): 79 (15 Apr 2022 10:00) (58 - 83)  RR: 23 (15 Apr 2022 10:00) (21 - 27)  SpO2: 96% (15 Apr 2022 10:00) (96% - 100%)    LABS:                        9.9    11.39 )-----------( 272      ( 2022 04:43 )             32.9     04-15    151<H>  |  116<H>  |  37<H>  ----------------------------<  223<H>  4.1   |  21  |  1.2    Ca    8.0<L>      15 Apr 2022 04:30  Phos  1.5     04-15  Mg     1.8     -15    TPro  5.9<L>  /  Alb  2.3<L>  /  TBili  0.7  /  DBili  x   /  AST  34  /  ALT  20  /  AlkPhos  144<H>  -15    PT/INR - ( 2022 13:14 )   PT: 13.00 sec;   INR: 1.13 ratio         PTT - ( 2022 13:14 )  PTT:33.2 sec  Urinalysis Basic - ( 2022 17:55 )    Color: Yellow / Appearance: Clear / S.030 / pH: x  Gluc: x / Ketone: Large  / Bili: Small / Urobili: <2 mg/dL   Blood: x / Protein: 100 mg/dL / Nitrite: Negative   Leuk Esterase: Negative / RBC: >50 /HPF / WBC 10-25 /HPF   Sq Epi: x / Non Sq Epi: Few / Bacteria: Moderate          Culture - Urine (collected 2022 17:55)  Source: Clean Catch Clean Catch (Midstream)  Final Report (2022 22:58):    <10,000 CFU/mL Normal Urogenital Whitney    Culture - Blood (collected 2022 13:14)  Source: .Blood Blood-Peripheral  Preliminary Report (2022 22:01):    No growth to date.    Culture - Blood (collected 2022 13:14)  Source: .Blood Blood-Peripheral  Preliminary Report (2022 22:01):    No growth to date.      CARDIAC MARKERS ( 15 Apr 2022 04:30 )  x     / 0.07 ng/mL / x     / x     / x      CARDIAC MARKERS ( 2022 15:56 )  x     / 0.08 ng/mL / 60 U/L / x     / x      CARDIAC MARKERS ( 2022 22:03 )  x     / x     / 305 U/L / x     / x              HOSPITAL COURSE:  The patient is nonverbal, AAO*0, history mainly obtained from the charts.   Tried calling Isabelle: [phone] no answer, left a voice mail, will re-attempt, family may be interested in GOC discussion, DNR/DNI status.     86 y/o male with a PMH of uncontrolled DM, prostate ca in remission, and late stage alzheimer's presents to the ED for evaluation of failure to thrive. pt is a poor historian, and [place] speaking. I spoke with pt daughter Isabelle who reports pt lives home and has not been eating or drinking since he left the hospital in 2022 ( few weeks ago). She reports that the pt was recently admitted - for hypoglycemia and pathological hip fracture. she reports pt is at baseline, and baseline is confused. pt daughter reports she placed a flores in him at home yesterday and she is a nurse.    Pt was found to be in DKA/HHS w/ ?starvation ketoacidosis. Pt was started on insulin gtt. Pt was also found to have hypernatremia. Per daughter's wishes, NGT was placed  and tube feeds were initiated. Pt was transitioned to SQI this morning. Pt's IVF were changed to D5@70/hr this morning and free water flushes were added. Please follow-up BMP ATC to monitor Na to ensure no overcorrection. Consider d/c D5 when Na reach 145. GOC discussed with daughter, goal is for pt to receive PEG tube and for daughter to take pt home. Pt presented with flores from home, TOV initiated today.       ASSESSMENT & PLAN:   86 y/o male with a PMH of uncontrolled DM, prostate ca in remission, and late stage alzheimer's presents to the ED for evaluation of failure to thrive. pt is a poor historian, and [place] speaking.    # DKA, resolved  # HAGMA, resolved  # Lactic acidosis, improving  # ?Starvation ketoacidosis, resolving  - FS: 219, 199, 167, 54, 51, 65, 125, 62, 265, 304, 287, 281, 214  - on admission, acidotic, BHB 8.5, UA+ketones, glucose, , AG 34, Sosm 380 (elev)  - started on insulin gtt, D5+NS -- transitioned to SQI this morning  - pt not tolerating PO 2/2 mental status, NGT placed yesterday, feeds started  - AG now 14, bicarb now 21  - LA 4.0->2.9->3.4->5.1->3.1, rpt today  - a1c 9.8 (22)  - ->60; Trop 0.08->0.07  - FS q6h  - unclear precipitating factor  - endo c/s appreciated -- check PTH, TSH, T4    # Severe alzheimer disease  - pt nonverbal, AOx0  - lives at home with daughter  - poor PO intake over past several weeks  - pt obtunded this AM  - s/p speech eval -- NPO. NGT placed.  - GOC d/w daughter - wants PEG placed and goal to take pt home  - GI consult placed for PEG    # MANDA, resolving  - Cr 1.7 on admission, now 1.2 -- baseline ~0.7-0.9  - BUN/Cr ratio >25, suspect prerenal in setting of poor PO intake  - cont IVF    # Hypernatremia  - Na 154->151->151  - IVF with D5@70cc/hr -- dc fluids when Na reaches 145  - free water via NGT  - BMP q6h to monitor Na, correct 4-6/24h    # R foot eschar  - s/p podiatry eval - local wound care, EZ boots, WBAT, no surgical intervention  - f/u LE arterial duplex read  - check MRSA  - procal 0.22  - f/u outpatient podiatry    # Positive UA  - BCx x2 NGTD  - UCx normal whitney  - UA with mod bacteria  - s/p ID eval - zosyn (hx of E. faecalis)  - ID f/u as cultures negative, pt afebrile, mild leukocytosis    TOV today    PT/REHAB n/a  ACTIVITY: bedrest  DVT PPX: heparin   Injectable -- f/u LE duplex  GI PPX: pantoprazole  DIET: NPO w tube feeds  CODE: full  Disposition: CCU -- if no recurrence of DKA on 11AM BMP -- downgrade to floors  Pending: GI c/s; TOV; ID f/u; f/u Na        For Follow-Up:  - GI c/s for PEG  - TOV initiated today  - BMP 8PM to monitor Na (on D5, free water flushes)  - ID f/u for ?need for abx as cx negative  - receiving KPhos for low Phos -- f/u AM Phos Resident Transfer Note    Transfer from: CCU  Transfer to: 4A floor    MEDICATIONS:  STANDING MEDICATIONS  chlorhexidine 4% Liquid 1 Application(s) Topical daily  dextrose 5%. 1000 milliLiter(s) IV Continuous <Continuous>  dextrose 5%. 1000 milliLiter(s) IV Continuous <Continuous>  dextrose 5%. 1000 milliLiter(s) IV Continuous <Continuous>  dextrose 50% Injectable 25 Gram(s) IV Push once  dextrose 50% Injectable 12.5 Gram(s) IV Push once  dextrose 50% Injectable 25 Gram(s) IV Push once  donepezil 5 milliGRAM(s) Oral at bedtime  glucagon  Injectable 1 milliGRAM(s) IntraMuscular once  heparin   Injectable 5000 Unit(s) SubCutaneous every 12 hours  insulin glargine Injectable (LANTUS) 15 Unit(s) SubCutaneous every morning  insulin lispro (ADMELOG) corrective regimen sliding scale   SubCutaneous three times a day before meals  insulin lispro Injectable (ADMELOG) 3 Unit(s) SubCutaneous every 6 hours  pantoprazole   Suspension 40 milliGRAM(s) Oral before breakfast  piperacillin/tazobactam IVPB.. 3.375 Gram(s) IV Intermittent every 12 hours  polyethylene glycol 3350 17 Gram(s) Oral daily    PRN MEDICATIONS  dextrose Oral Gel 15 Gram(s) Oral once PRN  haloperidol     Tablet 0.5 milliGRAM(s) Oral at bedtime PRN      VITAL SIGNS: Last 24 Hours  T(C): 36.7 (15 Apr 2022 07:00), Max: 37.3 (2022 12:00)  T(F): 98 (15 Apr 2022 07:00), Max: 99.1 (2022 12:00)  HR: 83 (15 Apr 2022 10:00) (76 - 97)  BP: 110/62 (15 Apr 2022 10:00) (75/49 - 126/62)  BP(mean): 79 (15 Apr 2022 10:00) (58 - 83)  RR: 23 (15 Apr 2022 10:00) (21 - 27)  SpO2: 96% (15 Apr 2022 10:00) (96% - 100%)    LABS:                        9.9    11.39 )-----------( 272      ( 2022 04:43 )             32.9     04-15    151<H>  |  116<H>  |  37<H>  ----------------------------<  223<H>  4.1   |  21  |  1.2    Ca    8.0<L>      15 Apr 2022 04:30  Phos  1.5     04-15  Mg     1.8     -15    TPro  5.9<L>  /  Alb  2.3<L>  /  TBili  0.7  /  DBili  x   /  AST  34  /  ALT  20  /  AlkPhos  144<H>  -15    PT/INR - ( 2022 13:14 )   PT: 13.00 sec;   INR: 1.13 ratio         PTT - ( 2022 13:14 )  PTT:33.2 sec  Urinalysis Basic - ( 2022 17:55 )    Color: Yellow / Appearance: Clear / S.030 / pH: x  Gluc: x / Ketone: Large  / Bili: Small / Urobili: <2 mg/dL   Blood: x / Protein: 100 mg/dL / Nitrite: Negative   Leuk Esterase: Negative / RBC: >50 /HPF / WBC 10-25 /HPF   Sq Epi: x / Non Sq Epi: Few / Bacteria: Moderate          Culture - Urine (collected 2022 17:55)  Source: Clean Catch Clean Catch (Midstream)  Final Report (2022 22:58):    <10,000 CFU/mL Normal Urogenital Whitney    Culture - Blood (collected 2022 13:14)  Source: .Blood Blood-Peripheral  Preliminary Report (2022 22:01):    No growth to date.    Culture - Blood (collected 2022 13:14)  Source: .Blood Blood-Peripheral  Preliminary Report (2022 22:01):    No growth to date.      CARDIAC MARKERS ( 15 Apr 2022 04:30 )  x     / 0.07 ng/mL / x     / x     / x      CARDIAC MARKERS ( 2022 15:56 )  x     / 0.08 ng/mL / 60 U/L / x     / x      CARDIAC MARKERS ( 2022 22:03 )  x     / x     / 305 U/L / x     / x              HOSPITAL COURSE:  The patient is nonverbal, AAO*0, history mainly obtained from the charts.   Tried calling Isabelle: [phone] no answer, left a voice mail, will re-attempt, family may be interested in GOC discussion, DNR/DNI status.     88 y/o male with a PMH of uncontrolled DM, prostate ca in remission, and late stage alzheimer's presents to the ED for evaluation of failure to thrive. pt is a poor historian, and [place] speaking. I spoke with pt daughter Isabelle who reports pt lives home and has not been eating or drinking since he left the hospital in 2022 ( few weeks ago). She reports that the pt was recently admitted - for hypoglycemia and pathological hip fracture. she reports pt is at baseline, and baseline is confused. pt daughter reports she placed a flroes in him at home yesterday and she is a nurse.    Pt was found to be in DKA/HHS w/ ?starvation ketoacidosis. Pt was started on insulin gtt. Pt was also found to have hypernatremia. Per daughter's wishes, NGT was placed  and tube feeds were initiated. Pt was transitioned to SQI this morning. Pt's IVF were changed to D5@70/hr this morning and free water flushes were added. Please follow-up BMP ATC to monitor Na to ensure no overcorrection. Consider d/c D5 when Na reach 145. GOC discussed with daughter, goal is for pt to receive PEG tube and for daughter to take pt home. Pt presented with flores from home, TOV initiated today.       ASSESSMENT & PLAN:   88 y/o male with a PMH of uncontrolled DM, prostate ca in remission, and late stage alzheimer's presents to the ED for evaluation of failure to thrive. pt is a poor historian, and [place] speaking.    # DKA, resolved  # HAGMA, resolved  # Lactic acidosis, improving  # ?Starvation ketoacidosis, resolving  - FS: 219, 199, 167, 54, 51, 65, 125, 62, 265, 304, 287, 281, 214  - on admission, acidotic, BHB 8.5, UA+ketones, glucose, , AG 34, Sosm 380 (elev)  - started on insulin gtt, D5+NS -- transitioned to SQI this morning  - pt not tolerating PO 2/2 mental status, NGT placed yesterday, feeds started  - AG now 14, bicarb now 21  - LA 4.0->2.9->3.4->5.1->3.1, rpt today  - a1c 9.8 (22)  - ->60; Trop 0.08->0.07  - FS q6h  - unclear precipitating factor  - endo c/s appreciated -- check PTH, TSH, T4    # Severe alzheimer disease  - pt nonverbal, AOx0  - lives at home with daughter  - poor PO intake over past several weeks  - pt obtunded this AM  - s/p speech eval -- NPO. NGT placed.  - GOC d/w daughter - wants PEG placed and goal to take pt home  - GI consult placed for PEG    # MANDA, resolving  - Cr 1.7 on admission, now 1.2 -- baseline ~0.7-0.9  - BUN/Cr ratio >25, suspect prerenal in setting of poor PO intake  - cont IVF    # Hypernatremia  - Na 154->151->151  - IVF with D5@70cc/hr -- dc fluids when Na reaches 145  - free water via NGT  - BMP q6h to monitor Na, correct 4-6/24h    # R foot eschar  - s/p podiatry eval - local wound care, EZ boots, WBAT, no surgical intervention  - f/u LE arterial duplex read  - check MRSA  - procal 0.22  - f/u outpatient podiatry    # Positive UA  - BCx x2 NGTD  - UCx normal whitney  - UA with mod bacteria  - s/p ID eval - zosyn (hx of E. faecalis)  - ID f/u as cultures negative, pt afebrile, mild leukocytosis    TOV today    PT/REHAB n/a  ACTIVITY: bedrest  DVT PPX: heparin   Injectable -- f/u LE duplex  GI PPX: pantoprazole  DIET: NPO w tube feeds  CODE: full  Disposition: CCU -- if no recurrence of DKA on 11AM BMP -- downgrade to floors  Pending: GI c/s; TOV; ID f/u; f/u Na        For Follow-Up:  - GI c/s for PEG  - TOV initiated today  - BMP 8PM to monitor Na (on D5, free water flushes)  - ID f/u for ?need for abx as cx negative  - receiving KPhos for low Phos -- f/u AM Phos    Signed out to 4A intern Dr. Paiz @ 04/15/2022 14:07

## 2022-04-16 LAB
ALBUMIN SERPL ELPH-MCNC: 2.1 G/DL — LOW (ref 3.5–5.2)
ALP SERPL-CCNC: 150 U/L — HIGH (ref 30–115)
ALT FLD-CCNC: 27 U/L — SIGNIFICANT CHANGE UP (ref 0–41)
ANION GAP SERPL CALC-SCNC: 13 MMOL/L — SIGNIFICANT CHANGE UP (ref 7–14)
ANION GAP SERPL CALC-SCNC: 9 MMOL/L — SIGNIFICANT CHANGE UP (ref 7–14)
ANION GAP SERPL CALC-SCNC: 9 MMOL/L — SIGNIFICANT CHANGE UP (ref 7–14)
AST SERPL-CCNC: 34 U/L — SIGNIFICANT CHANGE UP (ref 0–41)
BASOPHILS # BLD AUTO: 0.03 K/UL — SIGNIFICANT CHANGE UP (ref 0–0.2)
BASOPHILS NFR BLD AUTO: 0.2 % — SIGNIFICANT CHANGE UP (ref 0–1)
BILIRUB SERPL-MCNC: 0.4 MG/DL — SIGNIFICANT CHANGE UP (ref 0.2–1.2)
BUN SERPL-MCNC: 23 MG/DL — HIGH (ref 10–20)
BUN SERPL-MCNC: 26 MG/DL — HIGH (ref 10–20)
BUN SERPL-MCNC: 30 MG/DL — HIGH (ref 10–20)
CALCIUM SERPL-MCNC: 7.1 MG/DL — LOW (ref 8.5–10.1)
CALCIUM SERPL-MCNC: 7.1 MG/DL — LOW (ref 8.5–10.1)
CALCIUM SERPL-MCNC: 7.7 MG/DL — LOW (ref 8.5–10.1)
CHLORIDE SERPL-SCNC: 111 MMOL/L — HIGH (ref 98–110)
CHLORIDE SERPL-SCNC: 113 MMOL/L — HIGH (ref 98–110)
CHLORIDE SERPL-SCNC: 116 MMOL/L — HIGH (ref 98–110)
CO2 SERPL-SCNC: 21 MMOL/L — SIGNIFICANT CHANGE UP (ref 17–32)
CO2 SERPL-SCNC: 22 MMOL/L — SIGNIFICANT CHANGE UP (ref 17–32)
CO2 SERPL-SCNC: 23 MMOL/L — SIGNIFICANT CHANGE UP (ref 17–32)
CREAT SERPL-MCNC: 0.8 MG/DL — SIGNIFICANT CHANGE UP (ref 0.7–1.5)
CREAT SERPL-MCNC: 0.8 MG/DL — SIGNIFICANT CHANGE UP (ref 0.7–1.5)
CREAT SERPL-MCNC: 1 MG/DL — SIGNIFICANT CHANGE UP (ref 0.7–1.5)
EGFR: 73 ML/MIN/1.73M2 — SIGNIFICANT CHANGE UP
EGFR: 86 ML/MIN/1.73M2 — SIGNIFICANT CHANGE UP
EGFR: 86 ML/MIN/1.73M2 — SIGNIFICANT CHANGE UP
EOSINOPHIL # BLD AUTO: 0.09 K/UL — SIGNIFICANT CHANGE UP (ref 0–0.7)
EOSINOPHIL NFR BLD AUTO: 0.7 % — SIGNIFICANT CHANGE UP (ref 0–8)
GLUCOSE BLDC GLUCOMTR-MCNC: 158 MG/DL — HIGH (ref 70–99)
GLUCOSE BLDC GLUCOMTR-MCNC: 206 MG/DL — HIGH (ref 70–99)
GLUCOSE BLDC GLUCOMTR-MCNC: 232 MG/DL — HIGH (ref 70–99)
GLUCOSE BLDC GLUCOMTR-MCNC: 280 MG/DL — HIGH (ref 70–99)
GLUCOSE SERPL-MCNC: 142 MG/DL — HIGH (ref 70–99)
GLUCOSE SERPL-MCNC: 246 MG/DL — HIGH (ref 70–99)
GLUCOSE SERPL-MCNC: 253 MG/DL — HIGH (ref 70–99)
HCT VFR BLD CALC: 31.5 % — LOW (ref 42–52)
HGB BLD-MCNC: 9.9 G/DL — LOW (ref 14–18)
IMM GRANULOCYTES NFR BLD AUTO: 0.4 % — HIGH (ref 0.1–0.3)
LACTATE SERPL-SCNC: 3.1 MMOL/L — HIGH (ref 0.7–2)
LYMPHOCYTES # BLD AUTO: 1.19 K/UL — LOW (ref 1.2–3.4)
LYMPHOCYTES # BLD AUTO: 9.2 % — LOW (ref 20.5–51.1)
MAGNESIUM SERPL-MCNC: 1.7 MG/DL — LOW (ref 1.8–2.4)
MCHC RBC-ENTMCNC: 27 PG — SIGNIFICANT CHANGE UP (ref 27–31)
MCHC RBC-ENTMCNC: 31.4 G/DL — LOW (ref 32–37)
MCV RBC AUTO: 86.1 FL — SIGNIFICANT CHANGE UP (ref 80–94)
MONOCYTES # BLD AUTO: 0.27 K/UL — SIGNIFICANT CHANGE UP (ref 0.1–0.6)
MONOCYTES NFR BLD AUTO: 2.1 % — SIGNIFICANT CHANGE UP (ref 1.7–9.3)
NEUTROPHILS # BLD AUTO: 11.29 K/UL — HIGH (ref 1.4–6.5)
NEUTROPHILS NFR BLD AUTO: 87.4 % — HIGH (ref 42.2–75.2)
NRBC # BLD: 0 /100 WBCS — SIGNIFICANT CHANGE UP (ref 0–0)
PHOSPHATE SERPL-MCNC: 1.8 MG/DL — LOW (ref 2.1–4.9)
PLATELET # BLD AUTO: 227 K/UL — SIGNIFICANT CHANGE UP (ref 130–400)
POTASSIUM SERPL-MCNC: 3.5 MMOL/L — SIGNIFICANT CHANGE UP (ref 3.5–5)
POTASSIUM SERPL-MCNC: 4 MMOL/L — SIGNIFICANT CHANGE UP (ref 3.5–5)
POTASSIUM SERPL-MCNC: 4.6 MMOL/L — SIGNIFICANT CHANGE UP (ref 3.5–5)
POTASSIUM SERPL-SCNC: 3.5 MMOL/L — SIGNIFICANT CHANGE UP (ref 3.5–5)
POTASSIUM SERPL-SCNC: 4 MMOL/L — SIGNIFICANT CHANGE UP (ref 3.5–5)
POTASSIUM SERPL-SCNC: 4.6 MMOL/L — SIGNIFICANT CHANGE UP (ref 3.5–5)
PROT SERPL-MCNC: 5.5 G/DL — LOW (ref 6–8)
RBC # BLD: 3.66 M/UL — LOW (ref 4.7–6.1)
RBC # FLD: 19.2 % — HIGH (ref 11.5–14.5)
SODIUM SERPL-SCNC: 142 MMOL/L — SIGNIFICANT CHANGE UP (ref 135–146)
SODIUM SERPL-SCNC: 145 MMOL/L — SIGNIFICANT CHANGE UP (ref 135–146)
SODIUM SERPL-SCNC: 150 MMOL/L — HIGH (ref 135–146)
T4 FREE SERPL-MCNC: 1.2 NG/DL — SIGNIFICANT CHANGE UP (ref 0.9–1.8)
TSH SERPL-MCNC: 2.18 UIU/ML — SIGNIFICANT CHANGE UP (ref 0.27–4.2)
WBC # BLD: 12.92 K/UL — HIGH (ref 4.8–10.8)
WBC # FLD AUTO: 12.92 K/UL — HIGH (ref 4.8–10.8)

## 2022-04-16 PROCEDURE — 99233 SBSQ HOSP IP/OBS HIGH 50: CPT

## 2022-04-16 RX ORDER — MAGNESIUM SULFATE 500 MG/ML
2 VIAL (ML) INJECTION ONCE
Refills: 0 | Status: COMPLETED | OUTPATIENT
Start: 2022-04-16 | End: 2022-04-16

## 2022-04-16 RX ORDER — POTASSIUM PHOSPHATE, MONOBASIC POTASSIUM PHOSPHATE, DIBASIC 236; 224 MG/ML; MG/ML
30 INJECTION, SOLUTION INTRAVENOUS ONCE
Refills: 0 | Status: COMPLETED | OUTPATIENT
Start: 2022-04-16 | End: 2022-04-16

## 2022-04-16 RX ADMIN — CHLORHEXIDINE GLUCONATE 1 APPLICATION(S): 213 SOLUTION TOPICAL at 12:00

## 2022-04-16 RX ADMIN — Medication 25 GRAM(S): at 14:07

## 2022-04-16 RX ADMIN — SODIUM CHLORIDE 75 MILLILITER(S): 9 INJECTION, SOLUTION INTRAVENOUS at 12:01

## 2022-04-16 RX ADMIN — POTASSIUM PHOSPHATE, MONOBASIC POTASSIUM PHOSPHATE, DIBASIC 83.33 MILLIMOLE(S): 236; 224 INJECTION, SOLUTION INTRAVENOUS at 15:08

## 2022-04-16 RX ADMIN — Medication 5 UNIT(S): at 14:26

## 2022-04-16 RX ADMIN — HEPARIN SODIUM 5000 UNIT(S): 5000 INJECTION INTRAVENOUS; SUBCUTANEOUS at 05:55

## 2022-04-16 RX ADMIN — Medication 5 UNIT(S): at 22:07

## 2022-04-16 RX ADMIN — HEPARIN SODIUM 5000 UNIT(S): 5000 INJECTION INTRAVENOUS; SUBCUTANEOUS at 18:04

## 2022-04-16 RX ADMIN — Medication 5 UNIT(S): at 05:56

## 2022-04-16 RX ADMIN — POLYETHYLENE GLYCOL 3350 17 GRAM(S): 17 POWDER, FOR SOLUTION ORAL at 14:08

## 2022-04-16 RX ADMIN — PANTOPRAZOLE SODIUM 40 MILLIGRAM(S): 20 TABLET, DELAYED RELEASE ORAL at 08:33

## 2022-04-16 RX ADMIN — INSULIN GLARGINE 15 UNIT(S): 100 INJECTION, SOLUTION SUBCUTANEOUS at 07:32

## 2022-04-16 RX ADMIN — Medication 3: at 22:06

## 2022-04-16 RX ADMIN — DONEPEZIL HYDROCHLORIDE 5 MILLIGRAM(S): 10 TABLET, FILM COATED ORAL at 22:14

## 2022-04-16 NOTE — PROGRESS NOTE ADULT - SUBJECTIVE AND OBJECTIVE BOX
GERMANIA PAGAN 87y Male  MRN#: [id number]   CODE STATUS:________    Hospital Day: 3d    Pt is currently admitted with the primary diagnosis of HHS    SUBJECTIVE  Overnight events   -No major overnight events  Subjective complaints                                             ----------------------------------------------------------  OBJECTIVE  PAST MEDICAL & SURGICAL HISTORY  Prostate cancer    Dementia    Diabetes    No significant past surgical history                                              -----------------------------------------------------------  ALLERGIES:  No Known Allergies                                            ------------------------------------------------------------    HOME MEDICATIONS  Home Medications:  DONEPEZIL HCL 5 MG TABLET: 1 tab(s) orally once a day (26 Mar 2022 00:46)  Haldol 0.5 mg oral tablet: 1 tab(s) orally once a day, As Needed (02 May 2021 15:39)  Melatonin 5 mg oral tablet: 1 tab(s) orally once a day (at bedtime) (04 May 2021 16:06)                           MEDICATIONS:  STANDING MEDICATIONS  chlorhexidine 4% Liquid 1 Application(s) Topical daily  dextrose 5%. 1000 milliLiter(s) IV Continuous <Continuous>  dextrose 5%. 1000 milliLiter(s) IV Continuous <Continuous>  dextrose 5%. 1000 milliLiter(s) IV Continuous <Continuous>  dextrose 50% Injectable 25 Gram(s) IV Push once  dextrose 50% Injectable 12.5 Gram(s) IV Push once  dextrose 50% Injectable 25 Gram(s) IV Push once  donepezil 5 milliGRAM(s) Oral at bedtime  glucagon  Injectable 1 milliGRAM(s) IntraMuscular once  heparin   Injectable 5000 Unit(s) SubCutaneous every 12 hours  insulin glargine Injectable (LANTUS) 15 Unit(s) SubCutaneous every morning  insulin lispro (ADMELOG) corrective regimen sliding scale   SubCutaneous three times a day before meals  insulin lispro Injectable (ADMELOG) 5 Unit(s) SubCutaneous every 8 hours  pantoprazole   Suspension 40 milliGRAM(s) Oral before breakfast  polyethylene glycol 3350 17 Gram(s) Oral daily    PRN MEDICATIONS  dextrose Oral Gel 15 Gram(s) Oral once PRN  haloperidol     Tablet 0.5 milliGRAM(s) Oral at bedtime PRN                                            ------------------------------------------------------------  VITAL SIGNS: Last 24 Hours  T(C): 36.4 (2022 05:12), Max: 36.7 (15 Apr 2022 21:20)  T(F): 97.6 (2022 05:12), Max: 98.1 (15 Apr 2022 21:20)  HR: 85 (2022 06:40) (77 - 97)  BP: 105/59 (2022 06:40) (89/54 - 173/74)  BP(mean): 73 (15 Apr 2022 13:00) (67 - 79)  RR: 18 (2022 05:12) (18 - 27)  SpO2: 97% (15 Apr 2022 12:00) (96% - 98%)      04-15-22 @ 07:01  -  22 @ 07:00  --------------------------------------------------------  IN: 1895 mL / OUT: 300 mL / NET: 1595 mL                                             --------------------------------------------------------------  LABS:                        10.1   13.70 )-----------( 247      ( 15 Apr 2022 04:30 )             33.7     04-15    150<H>  |  117<H>  |  33<H>  ----------------------------<  196<H>  3.8   |  21  |  1.0    Ca    7.7<L>      15 Apr 2022 21:30  Phos  1.5     -15  Mg     1.8     -15    TPro  5.9<L>  /  Alb  2.3<L>  /  TBili  0.7  /  DBili  x   /  AST  34  /  ALT  20  /  AlkPhos  144<H>  -15          Lactate, Blood: 3.1 mmol/L *H* (04-15-22 @ 12:13)        Culture - Urine (collected 2022 17:55)  Source: Clean Catch Clean Catch (Midstream)  Final Report (2022 22:58):    <10,000 CFU/mL Normal Urogenital Whitney    Culture - Blood (collected 2022 13:14)  Source: .Blood Blood-Peripheral  Preliminary Report (2022 22:01):    No growth to date.    Culture - Blood (collected 2022 13:14)  Source: .Blood Blood-Peripheral  Preliminary Report (2022 22:01):    No growth to date.        CARDIAC MARKERS ( 15 Apr 2022 04:30 )  x     / 0.07 ng/mL / x     / x     / x      CARDIAC MARKERS ( 2022 15:56 )  x     / 0.08 ng/mL / 60 U/L / x     / x                                                    --------------------------------------------------------------    PHYSICAL EXAM:  General: Laying in bed. Contracted. NAD. Obtunded   HEENT: Atraumatic. Normocephalic. NGT.  Cardiac: Normal S1, S2. RRR. No murmurs, rubs, or gallops.  Pulm: CTA b/l   GI: Soft, nontender, nondistended.   Ext: 2+ pulses. Moving all 4 extremities. No edema, cyanosis. b/l LE contracted.  Neuro: obtunded  Skin: stage 2 sacral ulcer; R foot eschar                                           --------------------------------------------------------------  86 y/o male with a PMH of uncontrolled DM, prostate ca in remission, and late stage alzheimer's presents to the ED for evaluation of failure to thrive. pt is a poor historian, and [place]yr[place] speaking.    # DKA, resolved  # HAGMA, resolved  # Lactic acidosis, improving  # ?Starvation ketoacidosis, resolving  - FS: 219, 199, 167, 54, 51, 65, 125, 62, 265, 304, 287, 281, 214  - on admission, acidotic, BHB 8.5, UA+ketones, glucose, , AG 34, Sosm 380 (elev)  - started on insulin gtt, D5+NS -- transitioned to SQI this morning  - pt not tolerating PO 2/2 mental status, NGT placed yesterday, feeds started  - AG now 14, bicarb now 21  - LA 4.0->2.9->3.4->5.1->3.1, rpt today  - a1c 9.8 (22)  - ->60; Trop 0.08->0.07  - FS q6h  - unclear precipitating factor  - endo c/s appreciated -- check PTH, TSH, T4    # Severe alzheimer disease  - pt nonverbal, AOx0  - lives at home with daughter  - poor PO intake over past several weeks  - pt obtunded this AM  - s/p speech eval -- NPO. NGT placed.  - [facility] d/w daughter - wants PEG placed and goal to take pt home  - GI consult placed for PEG    # MANDA, resolving  - Cr 1.7 on admission, now 1.2 -- baseline ~0.7-0.9  - BUN/Cr ratio >25, suspect prerenal in setting of poor PO intake  - cont IVF    # Hypernatremia  - Na 154->151->151  - IVF with D5@70cc/hr -- dc fluids when Na reaches 145  - free water via NGT  - BMP q6h to monitor Na, correct 4-6/24h    # R foot eschar  - s/p podiatry eval - local wound care, EZ boots, WBAT, no surgical intervention  - f/u LE arterial duplex read  - check MRSA  - procal 0.22  - f/u outpatient podiatry    # Positive UA  - BCx x2 NGTD  - UCx normal whitney  - UA with mod bacteria  - s/p ID eval - zosyn (hx of E. faecalis)  - ID f/u as cultures negative, pt afebrile, mild leukocytosis    TOV today    PT/REHAB n/a  ACTIVITY: bedrest  DVT PPX: heparin   Injectable -- f/u LE duplex  GI PPX: pantoprazole  DIET: NPO w tube feeds  CODE: full  Disposition: CCU -- if no recurrence of DKA on 11AM BMP -- downgrade to floors  [place] AM BMP; GI c/s; TOV; ID f/u; f/u Na    #Progress Note Handoff  Pending (specify):  Family discussion:   Disposition:        GERMANIA PAGAN 87y Male  MRN#: [id number]   CODE STATUS:________    Hospital Day: 3d    Pt is currently admitted with the primary diagnosis of HHS    SUBJECTIVE  Overnight events   -No major overnight events  Subjective complaints   -not following commands, agitated but did not appear in distress                                          ----------------------------------------------------------  OBJECTIVE  PAST MEDICAL & SURGICAL HISTORY  Prostate cancer    Dementia    Diabetes    No significant past surgical history                                              -----------------------------------------------------------  ALLERGIES:  No Known Allergies                                            ------------------------------------------------------------    HOME MEDICATIONS  Home Medications:  DONEPEZIL HCL 5 MG TABLET: 1 tab(s) orally once a day (26 Mar 2022 00:46)  Haldol 0.5 mg oral tablet: 1 tab(s) orally once a day, As Needed (02 May 2021 15:39)  Melatonin 5 mg oral tablet: 1 tab(s) orally once a day (at bedtime) (04 May 2021 16:06)                           MEDICATIONS:  STANDING MEDICATIONS  chlorhexidine 4% Liquid 1 Application(s) Topical daily  dextrose 5%. 1000 milliLiter(s) IV Continuous <Continuous>  dextrose 5%. 1000 milliLiter(s) IV Continuous <Continuous>  dextrose 5%. 1000 milliLiter(s) IV Continuous <Continuous>  dextrose 50% Injectable 25 Gram(s) IV Push once  dextrose 50% Injectable 12.5 Gram(s) IV Push once  dextrose 50% Injectable 25 Gram(s) IV Push once  donepezil 5 milliGRAM(s) Oral at bedtime  glucagon  Injectable 1 milliGRAM(s) IntraMuscular once  heparin   Injectable 5000 Unit(s) SubCutaneous every 12 hours  insulin glargine Injectable (LANTUS) 15 Unit(s) SubCutaneous every morning  insulin lispro (ADMELOG) corrective regimen sliding scale   SubCutaneous three times a day before meals  insulin lispro Injectable (ADMELOG) 5 Unit(s) SubCutaneous every 8 hours  pantoprazole   Suspension 40 milliGRAM(s) Oral before breakfast  polyethylene glycol 3350 17 Gram(s) Oral daily    PRN MEDICATIONS  dextrose Oral Gel 15 Gram(s) Oral once PRN  haloperidol     Tablet 0.5 milliGRAM(s) Oral at bedtime PRN                                            ------------------------------------------------------------  VITAL SIGNS: Last 24 Hours  T(C): 36.4 (16 Apr 2022 05:12), Max: 36.7 (15 Apr 2022 21:20)  T(F): 97.6 (16 Apr 2022 05:12), Max: 98.1 (15 Apr 2022 21:20)  HR: 85 (16 Apr 2022 06:40) (77 - 97)  BP: 105/59 (16 Apr 2022 06:40) (89/54 - 173/74)  BP(mean): 73 (15 Apr 2022 13:00) (67 - 79)  RR: 18 (16 Apr 2022 05:12) (18 - 27)  SpO2: 97% (15 Apr 2022 12:00) (96% - 98%)      04-15-22 @ 07:01  -  04-16-22 @ 07:00  --------------------------------------------------------  IN: 1895 mL / OUT: 300 mL / NET: 1595 mL                                             --------------------------------------------------------------  LABS:                        10.1   13.70 )-----------( 247      ( 15 Apr 2022 04:30 )             33.7     04-15    150<H>  |  117<H>  |  33<H>  ----------------------------<  196<H>  3.8   |  21  |  1.0    Ca    7.7<L>      15 Apr 2022 21:30  Phos  1.5     04-15  Mg     1.8     04-15    TPro  5.9<L>  /  Alb  2.3<L>  /  TBili  0.7  /  DBili  x   /  AST  34  /  ALT  20  /  AlkPhos  144<H>  04-15          Lactate, Blood: 3.1 mmol/L *H* (04-15-22 @ 12:13)        Culture - Urine (collected 13 Apr 2022 17:55)  Source: Clean Catch Clean Catch (Midstream)  Final Report (14 Apr 2022 22:58):    <10,000 CFU/mL Normal Urogenital Pinky    Culture - Blood (collected 13 Apr 2022 13:14)  Source: .Blood Blood-Peripheral  Preliminary Report (14 Apr 2022 22:01):    No growth to date.    Culture - Blood (collected 13 Apr 2022 13:14)  Source: .Blood Blood-Peripheral  Preliminary Report (14 Apr 2022 22:01):    No growth to date.        CARDIAC MARKERS ( 15 Apr 2022 04:30 )  x     / 0.07 ng/mL / x     / x     / x      CARDIAC MARKERS ( 14 Apr 2022 15:56 )  x     / 0.08 ng/mL / 60 U/L / x     / x                                                    --------------------------------------------------------------    PHYSICAL EXAM:  General: Laying in bed. Contracted. NAD. Obtunded   HEENT: Atraumatic. Normocephalic. NGT.  Cardiac: Normal S1, S2. RRR. No murmurs, rubs, or gallops.  Pulm: CTA b/l   GI: Soft, nontender, nondistended.   Ext: 2+ pulses. Moving all 4 extremities. No edema, cyanosis. b/l LE contracted.  Neuro: obtunded  Skin: stage 2 sacral ulcer; R foot eschar                                           --------------------------------------------------------------

## 2022-04-16 NOTE — CHART NOTE - NSCHARTNOTEFT_GEN_A_CORE
RD c/s noted for PI's, however, NST now following pt. Please refer to their notes for recommendations.

## 2022-04-16 NOTE — PROGRESS NOTE ADULT - ASSESSMENT
88 y/o male with a PMH of uncontrolled DM, prostate ca in remission, and late stage alzheimer's presents to the ED for evaluation of failure to thrive. pt is a poor historian, and Mexican speaking.    # DKA, resolved  # HAGMA, resolved  # Lactic acidosis, improving  # ?Starvation ketoacidosis, resolving  - on admission, acidotic, BHB 8.5, UA+ketones, glucose, , AG 34, Sosm 380 (elev), a1c 9.8  - started on insulin gtt, D5+NS -- transitioned to SQI this morning  -failed SLP eval 4/14, NGT in place  - LA 4.0->>3.1  - endo c/s appreciated -- PTH 21, f/u TSH, T4    # Severe alzheimer disease  - pt nonverbal, AOx0  - lives at home with daughter  - poor PO intake over past several weeks, obtunded on admission  - s/p speech eval -- NPO. NGT placed.  - [facility] d/w daughter - wants PEG placed and goal to take pt home  - GI consult placed for PEG    # MANDA, resolving  - Cr 1.7 on admission,-- baseline ~0.7-0.9  - BUN/Cr ratio >25, suspect prerenal in setting of poor PO intake  - cont IVF    # Hypernatremia  - Na 154->151->151  - IVF with D5@75cc/hr -- dc fluids when Na reaches 145  - free water via NGT  - BMP q8h    # R foot eschar  - s/p podiatry eval - local wound care, EZ boots, WBAT, no surgical intervention  - f/u LE arterial duplex read  - check MRSA  - procal 0.22  - f/u outpatient podiatry    # Positive UA  - BCx x2 NGTD  - UCx normal whitney  - UA with mod bacteria  - s/p ID eval - zosyn (hx of E. faecalis)  - ID f/u as cultures negative, pt afebrile, mild leukocytosis    PT/REHAB n/a  ACTIVITY: bedrest  DVT PPX: heparin   Injectable  GI PPX: pantoprazole  DIET: NPO w tube feeds  CODE: full    #Progress Note Handoff  Pending (specify): PEG  Family discussion:   Disposition:   -daughter wants pt home (is nurse, grandchild is paid home caretaker)

## 2022-04-17 LAB
ALBUMIN SERPL ELPH-MCNC: 1.7 G/DL — LOW (ref 3.5–5.2)
ALP SERPL-CCNC: 138 U/L — HIGH (ref 30–115)
ALT FLD-CCNC: 24 U/L — SIGNIFICANT CHANGE UP (ref 0–41)
ANION GAP SERPL CALC-SCNC: 10 MMOL/L — SIGNIFICANT CHANGE UP (ref 7–14)
AST SERPL-CCNC: 27 U/L — SIGNIFICANT CHANGE UP (ref 0–41)
BASOPHILS # BLD AUTO: 0.02 K/UL — SIGNIFICANT CHANGE UP (ref 0–0.2)
BASOPHILS NFR BLD AUTO: 0.2 % — SIGNIFICANT CHANGE UP (ref 0–1)
BILIRUB SERPL-MCNC: 0.4 MG/DL — SIGNIFICANT CHANGE UP (ref 0.2–1.2)
BUN SERPL-MCNC: 20 MG/DL — SIGNIFICANT CHANGE UP (ref 10–20)
CALCIUM SERPL-MCNC: 7.1 MG/DL — LOW (ref 8.5–10.1)
CHLORIDE SERPL-SCNC: 110 MMOL/L — SIGNIFICANT CHANGE UP (ref 98–110)
CO2 SERPL-SCNC: 22 MMOL/L — SIGNIFICANT CHANGE UP (ref 17–32)
CREAT SERPL-MCNC: 0.8 MG/DL — SIGNIFICANT CHANGE UP (ref 0.7–1.5)
EGFR: 86 ML/MIN/1.73M2 — SIGNIFICANT CHANGE UP
EOSINOPHIL # BLD AUTO: 0.17 K/UL — SIGNIFICANT CHANGE UP (ref 0–0.7)
EOSINOPHIL NFR BLD AUTO: 2 % — SIGNIFICANT CHANGE UP (ref 0–8)
GLUCOSE BLDC GLUCOMTR-MCNC: 103 MG/DL — HIGH (ref 70–99)
GLUCOSE BLDC GLUCOMTR-MCNC: 140 MG/DL — HIGH (ref 70–99)
GLUCOSE BLDC GLUCOMTR-MCNC: 142 MG/DL — HIGH (ref 70–99)
GLUCOSE BLDC GLUCOMTR-MCNC: 30 MG/DL — CRITICAL LOW (ref 70–99)
GLUCOSE BLDC GLUCOMTR-MCNC: 30 MG/DL — CRITICAL LOW (ref 70–99)
GLUCOSE BLDC GLUCOMTR-MCNC: 79 MG/DL — SIGNIFICANT CHANGE UP (ref 70–99)
GLUCOSE SERPL-MCNC: 131 MG/DL — HIGH (ref 70–99)
HCT VFR BLD CALC: 27.4 % — LOW (ref 42–52)
HGB BLD-MCNC: 8.5 G/DL — LOW (ref 14–18)
IMM GRANULOCYTES NFR BLD AUTO: 0.5 % — HIGH (ref 0.1–0.3)
LYMPHOCYTES # BLD AUTO: 1.28 K/UL — SIGNIFICANT CHANGE UP (ref 1.2–3.4)
LYMPHOCYTES # BLD AUTO: 15.1 % — LOW (ref 20.5–51.1)
MAGNESIUM SERPL-MCNC: 1.9 MG/DL — SIGNIFICANT CHANGE UP (ref 1.8–2.4)
MCHC RBC-ENTMCNC: 26.5 PG — LOW (ref 27–31)
MCHC RBC-ENTMCNC: 31 G/DL — LOW (ref 32–37)
MCV RBC AUTO: 85.4 FL — SIGNIFICANT CHANGE UP (ref 80–94)
MONOCYTES # BLD AUTO: 0.27 K/UL — SIGNIFICANT CHANGE UP (ref 0.1–0.6)
MONOCYTES NFR BLD AUTO: 3.2 % — SIGNIFICANT CHANGE UP (ref 1.7–9.3)
NEUTROPHILS # BLD AUTO: 6.67 K/UL — HIGH (ref 1.4–6.5)
NEUTROPHILS NFR BLD AUTO: 79 % — HIGH (ref 42.2–75.2)
NRBC # BLD: 0 /100 WBCS — SIGNIFICANT CHANGE UP (ref 0–0)
PHOSPHATE SERPL-MCNC: 2.6 MG/DL — SIGNIFICANT CHANGE UP (ref 2.1–4.9)
PLATELET # BLD AUTO: 175 K/UL — SIGNIFICANT CHANGE UP (ref 130–400)
POTASSIUM SERPL-MCNC: 3.9 MMOL/L — SIGNIFICANT CHANGE UP (ref 3.5–5)
POTASSIUM SERPL-SCNC: 3.9 MMOL/L — SIGNIFICANT CHANGE UP (ref 3.5–5)
PROT SERPL-MCNC: 5 G/DL — LOW (ref 6–8)
RBC # BLD: 3.21 M/UL — LOW (ref 4.7–6.1)
RBC # FLD: 18.7 % — HIGH (ref 11.5–14.5)
SODIUM SERPL-SCNC: 142 MMOL/L — SIGNIFICANT CHANGE UP (ref 135–146)
WBC # BLD: 8.45 K/UL — SIGNIFICANT CHANGE UP (ref 4.8–10.8)
WBC # FLD AUTO: 8.45 K/UL — SIGNIFICANT CHANGE UP (ref 4.8–10.8)

## 2022-04-17 PROCEDURE — 99497 ADVNCD CARE PLAN 30 MIN: CPT | Mod: 25

## 2022-04-17 PROCEDURE — 99233 SBSQ HOSP IP/OBS HIGH 50: CPT

## 2022-04-17 PROCEDURE — 71045 X-RAY EXAM CHEST 1 VIEW: CPT | Mod: 26

## 2022-04-17 RX ORDER — DEXTROSE 50 % IN WATER 50 %
50 SYRINGE (ML) INTRAVENOUS ONCE
Refills: 0 | Status: COMPLETED | OUTPATIENT
Start: 2022-04-17 | End: 2022-04-17

## 2022-04-17 RX ADMIN — CHLORHEXIDINE GLUCONATE 1 APPLICATION(S): 213 SOLUTION TOPICAL at 13:45

## 2022-04-17 RX ADMIN — INSULIN GLARGINE 15 UNIT(S): 100 INJECTION, SOLUTION SUBCUTANEOUS at 07:04

## 2022-04-17 RX ADMIN — DONEPEZIL HYDROCHLORIDE 5 MILLIGRAM(S): 10 TABLET, FILM COATED ORAL at 22:06

## 2022-04-17 RX ADMIN — PANTOPRAZOLE SODIUM 40 MILLIGRAM(S): 20 TABLET, DELAYED RELEASE ORAL at 11:05

## 2022-04-17 RX ADMIN — HEPARIN SODIUM 5000 UNIT(S): 5000 INJECTION INTRAVENOUS; SUBCUTANEOUS at 06:25

## 2022-04-17 RX ADMIN — HEPARIN SODIUM 5000 UNIT(S): 5000 INJECTION INTRAVENOUS; SUBCUTANEOUS at 17:45

## 2022-04-17 RX ADMIN — Medication 5 UNIT(S): at 06:24

## 2022-04-17 RX ADMIN — Medication 50 MILLILITER(S): at 21:19

## 2022-04-17 NOTE — GOALS OF CARE CONVERSATION - ADVANCED CARE PLANNING - CONVERSATION DETAILS
Met patient's daughter and surrogate decision maker/caretaker, Isabelle. Introduced myself and invited her to reiterate what she understood is going on regarding diagnosis, prognosis, and goals of care. She identifies as a nurse and had extensive knowledge of her fathers current and active medical conditions specifically his end stage dementia leading to poor po intake, malnutrition, and several acid/base and lyte disturbances.    Initially she expressed she wanted to do hospice but emotionally she felt guilty "starving" him to death. She also notified me that her mother  last year after a prolonged hospice stay and her brother also  the year prior with hospice and has experienced guilt by not feeling like she did "enough". I did remind her as the body is shutting down the drive for hunger and thirst is typically diminished and there is no suffering directly from this phenomenon. In addition, when the body organs start shutting down, we provide palliative care and medications to make the patient comfortable and pass in peace. She acknowledged my education and would like to see how he does over the weekend as we await the PEG before she makes a final commitment, as I did remind her that a PEG could have its own issues and complications.    I also prompted her what matters most to the patient and then herself to which the answer was to live a "quality over quantity" life to both patient and self. She does acknowledge that the PEG does conflict with this goal and is giving her more to think about in the interim.     Asked for regular updates in the interim.

## 2022-04-17 NOTE — PROGRESS NOTE ADULT - ASSESSMENT
86 y/o male with a PMH of uncontrolled DM, prostate ca in remission, and late stage alzheimer's presents to the ED for evaluation of failure to thrive complicated for DKA.     # DKA, resolved  # HAGMA, resolved  # Lactic acidosis  # ?Starvation ketoacidosis, resolving  - on admission, acidotic, BHB 8.5, UA+ketones, glucose, , AG 34, Sosm 380 (elev), a1c 9.8  - started on insulin gtt, D5+NS -- transitioned to SQI this morning  - failed SLP eval 4/14, NGT in place with feeds per Dr. Rutledge  - lactic acid 4.3-->3.1. repeat pending  - endo c/s appreciated -- PTH 21, f/u TSH, T4    # Severe alzheimer disease  - pt nonverbal, AOx0  - lives at home with daughter  - poor PO intake over past several weeks, obtunded on admission  - s/p speech eval -- NPO. NGT placed.  - GOC d/w daughter by previous team in the unit - wants PEG placed and goal to take pt home(although this is not the recommendation of the current medical team)  - GI consult placed for PEG      # Hypernatremia, RESOLVED  - Na 154->151->150-->145-->142  - free water deficit 1.6L  - IVF with D5@75cc/hr  - free water via NGT increased to 250cc q4h  - BMP q8h to monitor for rapid correction    # Hypophosphatemia  - 1.8 -->2.6  - s/p repletement with 30mM KPhos IV    # Tachycardia  - sinus tachycardia  - still total volume depleted  - increased free water deficit as above    # Leukocytosis, RESOLVED  - persistent ~12k-->8k  - no fevers  - procal unremarkable  - will monitor off abx  - consider repeating cultures if febrile    # MANDA, resolved  - Cr 1.7 on admission,-- baseline ~0.7-0.9  - BUN/Cr ratio >25, suspect prerenal in setting of poor PO intake  - cont IVF    # R foot eschar  - s/p podiatry eval - local wound care, EZ boots, WBAT, no surgical intervention  - f/u LE arterial duplex read  - check MRSA  - procal 0.22  - f/u outpatient podiatry    # Positive UA  - BCx x2 NGTD  - UCx normal whitney  - UA with mod bacteria  - s/p ID eval - zosyn (hx of E. 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

## 2022-04-17 NOTE — PROGRESS NOTE ADULT - SUBJECTIVE AND OBJECTIVE BOX
GERMANIA PAGAN  87y  Male      Patient is a 87y old  Male who presents with a chief complaint of HHS (16 Apr 2022 07:26)      INTERVAL HPI/OVERNIGHT EVENTS:      REVIEW OF SYSTEMS:  unable to accurately obtain    VITALS  T(C): 36.6 (04-17-22 @ 05:12), Max: 36.6 (04-17-22 @ 05:12)  HR: 91 (04-17-22 @ 05:12) (89 - 104)  BP: 126/78 (04-17-22 @ 05:12) (99/56 - 128/66)  RR: 18 (04-17-22 @ 05:12) (18 - 20)  SpO2: 97% (04-16-22 @ 22:31) (97% - 97%)  Wt(kg): --Vital Signs Last 24 Hrs  T(C): 36.6 (17 Apr 2022 05:12), Max: 36.6 (17 Apr 2022 05:12)  T(F): 97.8 (17 Apr 2022 05:12), Max: 97.8 (17 Apr 2022 05:12)  HR: 91 (17 Apr 2022 05:12) (89 - 104)  BP: 126/78 (17 Apr 2022 05:12) (99/56 - 128/66)  BP(mean): --  RR: 18 (17 Apr 2022 05:12) (18 - 20)  SpO2: 97% (16 Apr 2022 22:31) (97% - 97%)      04-16-22 @ 07:01  -  04-17-22 @ 07:00  --------------------------------------------------------  IN: 1750 mL / OUT: 200 mL / NET: 1550 mL        PHYSICAL EXAM:  GENERAL: NAD, well-groomed, well-developed  PSYCH: no agitation, baseline mentation  NERVOUS SYSTEM:  Alert & Oriented X3, Motor Strength 5/5 B/L upper and lower extremities; Sensory intact; FTN WNL  PULMONARY: Clear to percussion bilaterally; No rales, rhonchi, wheezing, or rubs  CARDIOVASCULAR: Regular rate and rhythm; No murmurs, rubs, or gallops  GI: Soft, Nontender, Nondistended; Bowel sounds present  EXTREMITIES:  2+ Peripheral Pulses, No clubbing, cyanosis, or edema  LYMPH: No lymphadenopathy noted  SKIN: No rashes or lesions    Consultant(s) Notes Reviewed:  [x ] YES  [ ] NO    Discussed with Consultants/Other Providers [ x] YES     LABS                          8.5    8.45  )-----------( 175      ( 17 Apr 2022 06:55 )             27.4     04-17    142  |  110  |  20  ----------------------------<  131<H>  3.9   |  22  |  0.8    Ca    7.1<L>      17 Apr 2022 06:55  Phos  2.6     04-17  Mg     1.9     04-17    TPro  5.0<L>  /  Alb  1.7<L>  /  TBili  0.4  /  DBili  x   /  AST  27  /  ALT  24  /  AlkPhos  138<H>  04-17      Lactate Trend  04-16 @ 04:30 Lactate:3.1   04-15 @ 12:13 Lactate:3.1   04-14 @ 13:00 Lactate:3.1   04-14 @ 01:49 Lactate:5.1   04-13 @ 22:03 Lactate:3.4     POCT Blood Glucose.: 142 mg/dL (17 Apr 2022 06:20)      RADIOLOGY & ADDITIONAL TESTS:  - no images 4/17  Imaging Personally Reviewed:  [ ] YES  [ ] NO    HEALTH ISSUES - PROBLEM Dx:      MEDICATIONS  (STANDING):  chlorhexidine 4% Liquid 1 Application(s) Topical daily  dextrose 5%. 1000 milliLiter(s) (100 mL/Hr) IV Continuous <Continuous>  dextrose 5%. 1000 milliLiter(s) (50 mL/Hr) IV Continuous <Continuous>  dextrose 50% Injectable 25 Gram(s) IV Push once  dextrose 50% Injectable 12.5 Gram(s) IV Push once  dextrose 50% Injectable 25 Gram(s) IV Push once  donepezil 5 milliGRAM(s) Oral at bedtime  glucagon  Injectable 1 milliGRAM(s) IntraMuscular once  heparin   Injectable 5000 Unit(s) SubCutaneous every 12 hours  insulin glargine Injectable (LANTUS) 15 Unit(s) SubCutaneous every morning  insulin lispro (ADMELOG) corrective regimen sliding scale   SubCutaneous three times a day before meals  insulin lispro Injectable (ADMELOG) 5 Unit(s) SubCutaneous every 8 hours  pantoprazole   Suspension 40 milliGRAM(s) Oral before breakfast  polyethylene glycol 3350 17 Gram(s) Oral daily    MEDICATIONS  (PRN):  dextrose Oral Gel 15 Gram(s) Oral once PRN Blood Glucose LESS THAN 70 milliGRAM(s)/deciliter  haloperidol     Tablet 0.5 milliGRAM(s) Oral at bedtime PRN Agitation/Confusion     GERMANIA PAGAN  87y  Male      Patient is a 87y old  Male who presents with a chief complaint of HHS (16 Apr 2022 07:26)      INTERVAL HPI/OVERNIGHT EVENTS: no acute events overnight. no nursing concerns      REVIEW OF SYSTEMS:  unable to accurately obtain    VITALS  T(C): 36.6 (04-17-22 @ 05:12), Max: 36.6 (04-17-22 @ 05:12)  HR: 91 (04-17-22 @ 05:12) (89 - 104)  BP: 126/78 (04-17-22 @ 05:12) (99/56 - 128/66)  RR: 18 (04-17-22 @ 05:12) (18 - 20)  SpO2: 97% (04-16-22 @ 22:31) (97% - 97%)  Wt(kg): --Vital Signs Last 24 Hrs  T(C): 36.6 (17 Apr 2022 05:12), Max: 36.6 (17 Apr 2022 05:12)  T(F): 97.8 (17 Apr 2022 05:12), Max: 97.8 (17 Apr 2022 05:12)  HR: 91 (17 Apr 2022 05:12) (89 - 104)  BP: 126/78 (17 Apr 2022 05:12) (99/56 - 128/66)  BP(mean): --  RR: 18 (17 Apr 2022 05:12) (18 - 20)  SpO2: 97% (16 Apr 2022 22:31) (97% - 97%)      04-16-22 @ 07:01  -  04-17-22 @ 07:00  --------------------------------------------------------  IN: 1750 mL / OUT: 200 mL / NET: 1550 mL        PHYSICAL EXAM:  GENERAL: NAD, well-groomed, well-developed  PSYCH: no agitation, baseline mentation  NERVOUS SYSTEM:  Alert & Oriented X3, Motor Strength 5/5 B/L upper and lower extremities; Sensory intact; FTN WNL  PULMONARY: Clear to percussion bilaterally; No rales, rhonchi, wheezing, or rubs  CARDIOVASCULAR: Regular rate and rhythm; No murmurs, rubs, or gallops  GI: Soft, Nontender, Nondistended; Bowel sounds present  EXTREMITIES:  2+ Peripheral Pulses, No clubbing, cyanosis, or edema  LYMPH: No lymphadenopathy noted  SKIN: No rashes or lesions    Consultant(s) Notes Reviewed:  [x ] YES  [ ] NO    Discussed with Consultants/Other Providers [ x] YES     LABS                          8.5    8.45  )-----------( 175      ( 17 Apr 2022 06:55 )             27.4     04-17    142  |  110  |  20  ----------------------------<  131<H>  3.9   |  22  |  0.8    Ca    7.1<L>      17 Apr 2022 06:55  Phos  2.6     04-17  Mg     1.9     04-17    TPro  5.0<L>  /  Alb  1.7<L>  /  TBili  0.4  /  DBili  x   /  AST  27  /  ALT  24  /  AlkPhos  138<H>  04-17      Lactate Trend  04-16 @ 04:30 Lactate:3.1   04-15 @ 12:13 Lactate:3.1   04-14 @ 13:00 Lactate:3.1   04-14 @ 01:49 Lactate:5.1   04-13 @ 22:03 Lactate:3.4     POCT Blood Glucose.: 142 mg/dL (17 Apr 2022 06:20)      RADIOLOGY & ADDITIONAL TESTS:  - no images 4/17  Imaging Personally Reviewed:  [ ] YES  [ ] NO    HEALTH ISSUES - PROBLEM Dx:      MEDICATIONS  (STANDING):  chlorhexidine 4% Liquid 1 Application(s) Topical daily  dextrose 5%. 1000 milliLiter(s) (100 mL/Hr) IV Continuous <Continuous>  dextrose 5%. 1000 milliLiter(s) (50 mL/Hr) IV Continuous <Continuous>  dextrose 50% Injectable 25 Gram(s) IV Push once  dextrose 50% Injectable 12.5 Gram(s) IV Push once  dextrose 50% Injectable 25 Gram(s) IV Push once  donepezil 5 milliGRAM(s) Oral at bedtime  glucagon  Injectable 1 milliGRAM(s) IntraMuscular once  heparin   Injectable 5000 Unit(s) SubCutaneous every 12 hours  insulin glargine Injectable (LANTUS) 15 Unit(s) SubCutaneous every morning  insulin lispro (ADMELOG) corrective regimen sliding scale   SubCutaneous three times a day before meals  insulin lispro Injectable (ADMELOG) 5 Unit(s) SubCutaneous every 8 hours  pantoprazole   Suspension 40 milliGRAM(s) Oral before breakfast  polyethylene glycol 3350 17 Gram(s) Oral daily    MEDICATIONS  (PRN):  dextrose Oral Gel 15 Gram(s) Oral once PRN Blood Glucose LESS THAN 70 milliGRAM(s)/deciliter  haloperidol     Tablet 0.5 milliGRAM(s) Oral at bedtime PRN Agitation/Confusion     GERMANIA PAGAN  87y  Male      Patient is a 87y old  Male who presents with a chief complaint of HHS (16 Apr 2022 07:26)      INTERVAL HPI/OVERNIGHT EVENTS: no acute events overnight. no nursing concerns      REVIEW OF SYSTEMS:  unable to accurately obtain    VITALS  T(C): 36.6 (04-17-22 @ 05:12), Max: 36.6 (04-17-22 @ 05:12)  HR: 91 (04-17-22 @ 05:12) (89 - 104)  BP: 126/78 (04-17-22 @ 05:12) (99/56 - 128/66)  RR: 18 (04-17-22 @ 05:12) (18 - 20)  SpO2: 97% (04-16-22 @ 22:31) (97% - 97%)  Wt(kg): --Vital Signs Last 24 Hrs  T(C): 36.6 (17 Apr 2022 05:12), Max: 36.6 (17 Apr 2022 05:12)  T(F): 97.8 (17 Apr 2022 05:12), Max: 97.8 (17 Apr 2022 05:12)  HR: 91 (17 Apr 2022 05:12) (89 - 104)  BP: 126/78 (17 Apr 2022 05:12) (99/56 - 128/66)  BP(mean): --  RR: 18 (17 Apr 2022 05:12) (18 - 20)  SpO2: 97% (16 Apr 2022 22:31) (97% - 97%)      04-16-22 @ 07:01  -  04-17-22 @ 07:00  --------------------------------------------------------  IN: 1750 mL / OUT: 200 mL / NET: 1550 mL        PHYSICAL EXAM:  GENERAL: chronically ill appearing M, frail  ENT: NGT in place with feeds running  PSYCH: no agitation, baseline mentation  NERVOUS SYSTEM:  Alert & Oriented X0, unable to follow commands  PULMONARY: no accessory muscle use, difficult to ausculate given position in bed  CARDIOVASCULAR: Regular rate and rhythm; No murmurs, rubs, or gallops  GI: Soft, Nontender, Nondistended; Bowel sounds present  EXTREMITIES:  contorted and lying in left lateral decubitus position  SKIN: No rashes or lesions    Consultant(s) Notes Reviewed:  [x ] YES  [ ] NO    Discussed with Consultants/Other Providers [ x] YES     LABS                          8.5    8.45  )-----------( 175      ( 17 Apr 2022 06:55 )             27.4     04-17    142  |  110  |  20  ----------------------------<  131<H>  3.9   |  22  |  0.8    Ca    7.1<L>      17 Apr 2022 06:55  Phos  2.6     04-17  Mg     1.9     04-17    TPro  5.0<L>  /  Alb  1.7<L>  /  TBili  0.4  /  DBili  x   /  AST  27  /  ALT  24  /  AlkPhos  138<H>  04-17      Lactate Trend  04-16 @ 04:30 Lactate:3.1   04-15 @ 12:13 Lactate:3.1   04-14 @ 13:00 Lactate:3.1   04-14 @ 01:49 Lactate:5.1   04-13 @ 22:03 Lactate:3.4     POCT Blood Glucose.: 142 mg/dL (17 Apr 2022 06:20)      RADIOLOGY & ADDITIONAL TESTS:  - no images 4/17  Imaging Personally Reviewed:  [ ] YES  [ ] NO    HEALTH ISSUES - PROBLEM Dx:      MEDICATIONS  (STANDING):  chlorhexidine 4% Liquid 1 Application(s) Topical daily  dextrose 5%. 1000 milliLiter(s) (100 mL/Hr) IV Continuous <Continuous>  dextrose 5%. 1000 milliLiter(s) (50 mL/Hr) IV Continuous <Continuous>  dextrose 50% Injectable 25 Gram(s) IV Push once  dextrose 50% Injectable 12.5 Gram(s) IV Push once  dextrose 50% Injectable 25 Gram(s) IV Push once  donepezil 5 milliGRAM(s) Oral at bedtime  glucagon  Injectable 1 milliGRAM(s) IntraMuscular once  heparin   Injectable 5000 Unit(s) SubCutaneous every 12 hours  insulin glargine Injectable (LANTUS) 15 Unit(s) SubCutaneous every morning  insulin lispro (ADMELOG) corrective regimen sliding scale   SubCutaneous three times a day before meals  insulin lispro Injectable (ADMELOG) 5 Unit(s) SubCutaneous every 8 hours  pantoprazole   Suspension 40 milliGRAM(s) Oral before breakfast  polyethylene glycol 3350 17 Gram(s) Oral daily    MEDICATIONS  (PRN):  dextrose Oral Gel 15 Gram(s) Oral once PRN Blood Glucose LESS THAN 70 milliGRAM(s)/deciliter  haloperidol     Tablet 0.5 milliGRAM(s) Oral at bedtime PRN Agitation/Confusion

## 2022-04-18 LAB
ALBUMIN SERPL ELPH-MCNC: 2 G/DL — LOW (ref 3.5–5.2)
ALP SERPL-CCNC: 141 U/L — HIGH (ref 30–115)
ALT FLD-CCNC: 29 U/L — SIGNIFICANT CHANGE UP (ref 0–41)
ANION GAP SERPL CALC-SCNC: 10 MMOL/L — SIGNIFICANT CHANGE UP (ref 7–14)
AST SERPL-CCNC: 41 U/L — SIGNIFICANT CHANGE UP (ref 0–41)
BASOPHILS # BLD AUTO: 0.01 K/UL — SIGNIFICANT CHANGE UP (ref 0–0.2)
BASOPHILS NFR BLD AUTO: 0.2 % — SIGNIFICANT CHANGE UP (ref 0–1)
BILIRUB SERPL-MCNC: 0.5 MG/DL — SIGNIFICANT CHANGE UP (ref 0.2–1.2)
BUN SERPL-MCNC: 15 MG/DL — SIGNIFICANT CHANGE UP (ref 10–20)
CALCIUM SERPL-MCNC: 7.3 MG/DL — LOW (ref 8.5–10.1)
CHLORIDE SERPL-SCNC: 108 MMOL/L — SIGNIFICANT CHANGE UP (ref 98–110)
CO2 SERPL-SCNC: 25 MMOL/L — SIGNIFICANT CHANGE UP (ref 17–32)
CREAT SERPL-MCNC: 0.7 MG/DL — SIGNIFICANT CHANGE UP (ref 0.7–1.5)
CULTURE RESULTS: SIGNIFICANT CHANGE UP
CULTURE RESULTS: SIGNIFICANT CHANGE UP
EGFR: 89 ML/MIN/1.73M2 — SIGNIFICANT CHANGE UP
EOSINOPHIL # BLD AUTO: 0.09 K/UL — SIGNIFICANT CHANGE UP (ref 0–0.7)
EOSINOPHIL NFR BLD AUTO: 1.4 % — SIGNIFICANT CHANGE UP (ref 0–8)
GLUCOSE BLDC GLUCOMTR-MCNC: 100 MG/DL — HIGH (ref 70–99)
GLUCOSE BLDC GLUCOMTR-MCNC: 127 MG/DL — HIGH (ref 70–99)
GLUCOSE BLDC GLUCOMTR-MCNC: 128 MG/DL — HIGH (ref 70–99)
GLUCOSE BLDC GLUCOMTR-MCNC: 141 MG/DL — HIGH (ref 70–99)
GLUCOSE BLDC GLUCOMTR-MCNC: 159 MG/DL — HIGH (ref 70–99)
GLUCOSE BLDC GLUCOMTR-MCNC: 165 MG/DL — HIGH (ref 70–99)
GLUCOSE BLDC GLUCOMTR-MCNC: 263 MG/DL — HIGH (ref 70–99)
GLUCOSE BLDC GLUCOMTR-MCNC: 37 MG/DL — CRITICAL LOW (ref 70–99)
GLUCOSE BLDC GLUCOMTR-MCNC: 37 MG/DL — CRITICAL LOW (ref 70–99)
GLUCOSE BLDC GLUCOMTR-MCNC: 47 MG/DL — CRITICAL LOW (ref 70–99)
GLUCOSE BLDC GLUCOMTR-MCNC: 49 MG/DL — CRITICAL LOW (ref 70–99)
GLUCOSE SERPL-MCNC: 116 MG/DL — HIGH (ref 70–99)
HCT VFR BLD CALC: 30.6 % — LOW (ref 42–52)
HGB BLD-MCNC: 9.8 G/DL — LOW (ref 14–18)
IMM GRANULOCYTES NFR BLD AUTO: 0.5 % — HIGH (ref 0.1–0.3)
LYMPHOCYTES # BLD AUTO: 1.13 K/UL — LOW (ref 1.2–3.4)
LYMPHOCYTES # BLD AUTO: 17.8 % — LOW (ref 20.5–51.1)
MAGNESIUM SERPL-MCNC: 1.7 MG/DL — LOW (ref 1.8–2.4)
MCHC RBC-ENTMCNC: 27 PG — SIGNIFICANT CHANGE UP (ref 27–31)
MCHC RBC-ENTMCNC: 32 G/DL — SIGNIFICANT CHANGE UP (ref 32–37)
MCV RBC AUTO: 84.3 FL — SIGNIFICANT CHANGE UP (ref 80–94)
MONOCYTES # BLD AUTO: 0.39 K/UL — SIGNIFICANT CHANGE UP (ref 0.1–0.6)
MONOCYTES NFR BLD AUTO: 6.2 % — SIGNIFICANT CHANGE UP (ref 1.7–9.3)
NEUTROPHILS # BLD AUTO: 4.69 K/UL — SIGNIFICANT CHANGE UP (ref 1.4–6.5)
NEUTROPHILS NFR BLD AUTO: 73.9 % — SIGNIFICANT CHANGE UP (ref 42.2–75.2)
NRBC # BLD: 0 /100 WBCS — SIGNIFICANT CHANGE UP (ref 0–0)
PHOSPHATE SERPL-MCNC: 2.2 MG/DL — SIGNIFICANT CHANGE UP (ref 2.1–4.9)
PLATELET # BLD AUTO: 202 K/UL — SIGNIFICANT CHANGE UP (ref 130–400)
POTASSIUM SERPL-MCNC: 4 MMOL/L — SIGNIFICANT CHANGE UP (ref 3.5–5)
POTASSIUM SERPL-SCNC: 4 MMOL/L — SIGNIFICANT CHANGE UP (ref 3.5–5)
PROT SERPL-MCNC: 5.4 G/DL — LOW (ref 6–8)
RBC # BLD: 3.63 M/UL — LOW (ref 4.7–6.1)
RBC # FLD: 18.8 % — HIGH (ref 11.5–14.5)
SARS-COV-2 RNA SPEC QL NAA+PROBE: DETECTED
SODIUM SERPL-SCNC: 143 MMOL/L — SIGNIFICANT CHANGE UP (ref 135–146)
SPECIMEN SOURCE: SIGNIFICANT CHANGE UP
SPECIMEN SOURCE: SIGNIFICANT CHANGE UP
WBC # BLD: 6.34 K/UL — SIGNIFICANT CHANGE UP (ref 4.8–10.8)
WBC # FLD AUTO: 6.34 K/UL — SIGNIFICANT CHANGE UP (ref 4.8–10.8)

## 2022-04-18 PROCEDURE — 99233 SBSQ HOSP IP/OBS HIGH 50: CPT

## 2022-04-18 PROCEDURE — 99223 1ST HOSP IP/OBS HIGH 75: CPT

## 2022-04-18 RX ORDER — INSULIN GLARGINE 100 [IU]/ML
7 INJECTION, SOLUTION SUBCUTANEOUS EVERY MORNING
Refills: 0 | Status: DISCONTINUED | OUTPATIENT
Start: 2022-04-18 | End: 2022-04-21

## 2022-04-18 RX ORDER — DEXTROSE 50 % IN WATER 50 %
50 SYRINGE (ML) INTRAVENOUS ONCE
Refills: 0 | Status: COMPLETED | OUTPATIENT
Start: 2022-04-18 | End: 2022-04-18

## 2022-04-18 RX ORDER — DEXTROSE 10 % IN WATER 10 %
250 INTRAVENOUS SOLUTION INTRAVENOUS
Refills: 0 | Status: DISCONTINUED | OUTPATIENT
Start: 2022-04-18 | End: 2022-04-20

## 2022-04-18 RX ADMIN — DONEPEZIL HYDROCHLORIDE 5 MILLIGRAM(S): 10 TABLET, FILM COATED ORAL at 21:49

## 2022-04-18 RX ADMIN — Medication 1000 MILLILITER(S): at 22:23

## 2022-04-18 RX ADMIN — Medication 5 UNIT(S): at 13:27

## 2022-04-18 RX ADMIN — HEPARIN SODIUM 5000 UNIT(S): 5000 INJECTION INTRAVENOUS; SUBCUTANEOUS at 05:32

## 2022-04-18 RX ADMIN — CHLORHEXIDINE GLUCONATE 1 APPLICATION(S): 213 SOLUTION TOPICAL at 13:12

## 2022-04-18 RX ADMIN — INSULIN GLARGINE 15 UNIT(S): 100 INJECTION, SOLUTION SUBCUTANEOUS at 08:21

## 2022-04-18 RX ADMIN — PANTOPRAZOLE SODIUM 40 MILLIGRAM(S): 20 TABLET, DELAYED RELEASE ORAL at 05:33

## 2022-04-18 RX ADMIN — Medication 50 MILLILITER(S): at 17:00

## 2022-04-18 RX ADMIN — POLYETHYLENE GLYCOL 3350 17 GRAM(S): 17 POWDER, FOR SOLUTION ORAL at 13:11

## 2022-04-18 RX ADMIN — HEPARIN SODIUM 5000 UNIT(S): 5000 INJECTION INTRAVENOUS; SUBCUTANEOUS at 17:21

## 2022-04-18 NOTE — CONSULT NOTE ADULT - ATTENDING COMMENTS
right foot lateral ulcer w/ stable necrotic patch  no surgical intervention from podiatric standpoint  recommend local wound care w/ betadine soaked gauze    Thank you for allowing me to participate in your patient care.     My notes supersedes the above resident documentation in case of discrepancy. Correction for their notes is in my notes.     Jose Juan Griffin DPM
Pt examined  labs and images reviewed  will schedule for lap g tube  palliative consult
patient seen and examined agree above   insulin drip for DKA follow AG, bicarb, lytes   Iv fluid 1/2 ns add dextrose if Fs less then 250   follow K   follow NA level   start free water per Ng tube   palliaitve care   clarify DNr/ DNI   very poor prognosis
DKA in this patient with advanced dementia/failure to thrive.  Known h/o DM.  Anion gap remains mildly elevated.  Recommend maintaining IV insulin drip until feeding status established.  Check TFTS (TSH and Free T4),  25-hydroxy vitamin D, and intact PTH (likely secondary hyperparathyroidism).

## 2022-04-18 NOTE — PROGRESS NOTE ADULT - SUBJECTIVE AND OBJECTIVE BOX
GERMANIA PAGAN 87y Male  MRN#: [id number]   CODE STATUS:________    Hospital Day:     Pt is currently admitted with the primary diagnosis of     SUBJECTIVE  Hospital Course    Overnight events     Subjective complaints     Present Today:   - Rothman:  No [  ], Yes [   ] : Indication:     - Type of IV Access:       .. CVC/Piccline:  No [  ], Yes [   ] : Indication:       .. Midline: No [  ], Yes [   ] : Indication:                                             ----------------------------------------------------------  OBJECTIVE  PAST MEDICAL & SURGICAL HISTORY  Prostate cancer    Dementia    Diabetes    No significant past surgical history                                              -----------------------------------------------------------  ALLERGIES:  No Known Allergies                                            ------------------------------------------------------------    HOME MEDICATIONS  Home Medications:  DONEPEZIL HCL 5 MG TABLET: 1 tab(s) orally once a day (26 Mar 2022 00:46)  Haldol 0.5 mg oral tablet: 1 tab(s) orally once a day, As Needed (02 May 2021 15:39)  Melatonin 5 mg oral tablet: 1 tab(s) orally once a day (at bedtime) (04 May 2021 16:06)                           MEDICATIONS:  STANDING MEDICATIONS  chlorhexidine 4% Liquid 1 Application(s) Topical daily  dextrose 5%. 1000 milliLiter(s) IV Continuous <Continuous>  dextrose 5%. 1000 milliLiter(s) IV Continuous <Continuous>  dextrose 50% Injectable 25 Gram(s) IV Push once  dextrose 50% Injectable 12.5 Gram(s) IV Push once  dextrose 50% Injectable 25 Gram(s) IV Push once  donepezil 5 milliGRAM(s) Oral at bedtime  glucagon  Injectable 1 milliGRAM(s) IntraMuscular once  heparin   Injectable 5000 Unit(s) SubCutaneous every 12 hours  insulin glargine Injectable (LANTUS) 15 Unit(s) SubCutaneous every morning  insulin lispro (ADMELOG) corrective regimen sliding scale   SubCutaneous three times a day before meals  insulin lispro Injectable (ADMELOG) 5 Unit(s) SubCutaneous every 8 hours  pantoprazole   Suspension 40 milliGRAM(s) Oral before breakfast  polyethylene glycol 3350 17 Gram(s) Oral daily    PRN MEDICATIONS  dextrose Oral Gel 15 Gram(s) Oral once PRN  haloperidol     Tablet 0.5 milliGRAM(s) Oral at bedtime PRN                                            ------------------------------------------------------------  VITAL SIGNS: Last 24 Hours  T(C): 36.3 (18 Apr 2022 05:02), Max: 36.3 (18 Apr 2022 05:02)  T(F): 97.3 (18 Apr 2022 05:02), Max: 97.3 (18 Apr 2022 05:02)  HR: 80 (18 Apr 2022 05:02) (80 - 81)  BP: 140/66 (18 Apr 2022 05:02) (133/68 - 149/62)  BP(mean): --  RR: 18 (18 Apr 2022 05:02) (18 - 18)  SpO2: 96% (17 Apr 2022 20:23) (96% - 96%)      04-17-22 @ 07:01  -  04-18-22 @ 07:00  --------------------------------------------------------  IN: 1530 mL / OUT: 0 mL / NET: 1530 mL                                             --------------------------------------------------------------  LABS:                        9.8    6.34  )-----------( 202 ( 18 Apr 2022 07:22 )             30.6     04-18    143  |  108  |  15  ----------------------------<  116<H>  4.0   |  25  |  0.7    Ca    7.3<L>      18 Apr 2022 07:22  Phos  2.2     04-18  Mg     1.7     04-18    TPro  5.4<L>  /  Alb  2.0<L>  /  TBili  0.5  /  DBili  x   /  AST  41  /  ALT  29  /  AlkPhos  141<H>  04-18                                                              -------------------------------------------------------------  RADIOLOGY:                                            --------------------------------------------------------------    PHYSICAL EXAM:  General:   HEENT:  LUNGS:  HEART:  ABDOMEN:  EXT:  NEURO:  SKIN:                                           --------------------------------------------------------------    ASSESSMENT & PLAN    Past medical history and hospital course                                                                                                           ----------------------------------------------------  # DVT prophylaxis     # GI prophylaxis     # Diet     # Activity Score (AM-PAC)    # Code status     # Disposition                                                                              --------------------------------------------------------    # Handoff      GERMANIA PAGAN 87y Male  MRN#: [id number]   CODE STATUS:________        SUBJECTIVE    Patient was hypoglycemic overnight. Received D50 overnight and sugars improved. Tube feeds had been held previously, feeds restarted overnight.                                           ----------------------------------------------------------  OBJECTIVE  PAST MEDICAL & SURGICAL HISTORY  Prostate cancer    Dementia    Diabetes    No significant past surgical history                                              -----------------------------------------------------------  ALLERGIES:  No Known Allergies                                            ------------------------------------------------------------    HOME MEDICATIONS  Home Medications:  DONEPEZIL HCL 5 MG TABLET: 1 tab(s) orally once a day (26 Mar 2022 00:46)  Haldol 0.5 mg oral tablet: 1 tab(s) orally once a day, As Needed (02 May 2021 15:39)  Melatonin 5 mg oral tablet: 1 tab(s) orally once a day (at bedtime) (04 May 2021 16:06)                           MEDICATIONS:  STANDING MEDICATIONS  chlorhexidine 4% Liquid 1 Application(s) Topical daily  dextrose 5%. 1000 milliLiter(s) IV Continuous <Continuous>  dextrose 5%. 1000 milliLiter(s) IV Continuous <Continuous>  dextrose 50% Injectable 25 Gram(s) IV Push once  dextrose 50% Injectable 12.5 Gram(s) IV Push once  dextrose 50% Injectable 25 Gram(s) IV Push once  donepezil 5 milliGRAM(s) Oral at bedtime  glucagon  Injectable 1 milliGRAM(s) IntraMuscular once  heparin   Injectable 5000 Unit(s) SubCutaneous every 12 hours  insulin glargine Injectable (LANTUS) 15 Unit(s) SubCutaneous every morning  insulin lispro (ADMELOG) corrective regimen sliding scale   SubCutaneous three times a day before meals  insulin lispro Injectable (ADMELOG) 5 Unit(s) SubCutaneous every 8 hours  pantoprazole   Suspension 40 milliGRAM(s) Oral before breakfast  polyethylene glycol 3350 17 Gram(s) Oral daily    PRN MEDICATIONS  dextrose Oral Gel 15 Gram(s) Oral once PRN  haloperidol     Tablet 0.5 milliGRAM(s) Oral at bedtime PRN                                            ------------------------------------------------------------  VITAL SIGNS: Last 24 Hours  T(C): 36.3 (18 Apr 2022 05:02), Max: 36.3 (18 Apr 2022 05:02)  T(F): 97.3 (18 Apr 2022 05:02), Max: 97.3 (18 Apr 2022 05:02)  HR: 80 (18 Apr 2022 05:02) (80 - 81)  BP: 140/66 (18 Apr 2022 05:02) (133/68 - 149/62)  BP(mean): --  RR: 18 (18 Apr 2022 05:02) (18 - 18)  SpO2: 96% (17 Apr 2022 20:23) (96% - 96%)      04-17-22 @ 07:01  -  04-18-22 @ 07:00  --------------------------------------------------------  IN: 1530 mL / OUT: 0 mL / NET: 1530 mL                                             --------------------------------------------------------------  LABS:                        9.8    6.34  )-----------( 202      ( 18 Apr 2022 07:22 )             30.6     04-18    143  |  108  |  15  ----------------------------<  116<H>  4.0   |  25  |  0.7    Ca    7.3<L>      18 Apr 2022 07:22  Phos  2.2     04-18  Mg     1.7     04-18    TPro  5.4<L>  /  Alb  2.0<L>  /  TBili  0.5  /  DBili  x   /  AST  41  /  ALT  29  /  AlkPhos  141<H>  04-18          PHYSICAL EXAM:  General: NAD  LUNGS: CTAB  HEART: RRR  ABDOMEN: soft, nontender  NEURO: AAOx0                                             --------------------------------------------------------------    ASSESSMENT & PLAN      # DKA, resolved  # HAGMA, resolved  # Lactic acidosis  # ?Starvation ketoacidosis, resolving  - on admission, acidotic, BHB 8.5, UA+ketones, glucose, , AG 34, Sosm 380 (elev), a1c 9.8  - started on insulin gtt, D5+NS -- transitioned to SQI this morning  - failed SLP eval 4/14, NGT in place with feeds per Dr. Rutledge  - lactic acid 4.3-->3.1. repeat pending  - endo c/s appreciated -- PTH 21, f/u TSH, T4    # Severe alzheimer disease  - pt nonverbal, AOx0  - lives at home with daughter  - poor PO intake over past several weeks, obtunded on admission  - s/p speech eval -- NPO. NGT placed.  - GOC d/w daughter by previous team in the unit - wants PEG placed and goal to take pt home(although this is not the recommendation of the current medical team)  - GI consult placed for PEG  - Per GI patient not a candidate for percutaneous PEG, will need to be done by surgery  - Consult surgery for PEG if patient's daughter definitely wants to proceed with PEG    # Hypernatremia, RESOLVED  - Na 154->151->150-->145-->142  - free water deficit 1.6L  - free water via NGT increased to 250cc q4h  - BMP q8h to monitor for rapid correction    # Hypophosphatemia  - 1.8 -->2.6  - s/p repletement with 30mM KPhos IV    # Tachycardia  - sinus tachycardia  - still total volume depleted  - increased free water deficit as above    # Leukocytosis, RESOLVED  - persistent ~12k-->8k  - no fevers  - procal unremarkable  - will monitor off abx  - consider repeating cultures if febrile    # MANDA, resolved  - Cr 1.7 on admission,-- baseline ~0.7-0.9  - BUN/Cr ratio >25, suspect prerenal in setting of poor PO intake  - cont IVF    # R foot eschar  - s/p podiatry eval - local wound care, EZ boots, WBAT, no surgical intervention  - f/u LE arterial duplex read  - check MRSA  - procal 0.22  - f/u outpatient podiatry    # Positive UA  - BCx x2 NGTD  - UCx normal whitney  - UA with mod bacteria  - s/p ID eval - zosyn (hx of E. faecalis)  - ID f/u as cultures negative, pt afebrile, mild leukocytosis    PT/REHAB n/a  ACTIVITY: bedrest  DVT PPX: heparin   Injectable  GI PPX: pantoprazole  DIET: NPO w tube feeds  CODE: full

## 2022-04-18 NOTE — PROGRESS NOTE ADULT - ASSESSMENT
86 y/o male with a PMH of uncontrolled DM, prostate ca in remission, and late stage alzheimer's presents to the ED for evaluation of failure to thrive admitted for DKA/starvation ketoacidosis. GI was consulted for PEG tube     PEG Tube evaluation   -Indication failure to thrive   -Speech and swallow failed 4/14  -prior abdominal surgeries  -No Fever  -No Antiplatelet/anticoagulation    Ct scan abdomen 4/13 reviewed with radiology, colon interfering with percutaneous access to stomach. no window for PEG tube     Recs:   surgery evaluation for surgical PEG placement   continue feeding tube for now     -call as needed, [id number] during weekdays till 5pm and call GI service after 5pm and on weekends [phone]  -Follow up with our [facility] located at [address] [place] [address]. Phone Number: [phone]

## 2022-04-18 NOTE — PROGRESS NOTE ADULT - SUBJECTIVE AND OBJECTIVE BOX
Gastroenterology progress note:     Patient is a 87y old  Male who presents with a chief complaint of HHS (16 Apr 2022 07:26)       Admitted on: 04-13-22    We are following the patient for failure to thrive      Interval History: patient seen and examined. non cooperative     PAST MEDICAL & SURGICAL HISTORY:  Prostate cancer   Dementia  Diabetes  No significant past surgical history    MEDICATIONS  (STANDING):  chlorhexidine 4% Liquid 1 Application(s) Topical daily  dextrose 5%. 1000 milliLiter(s) (100 mL/Hr) IV Continuous <Continuous>  dextrose 5%. 1000 milliLiter(s) (50 mL/Hr) IV Continuous <Continuous>  dextrose 50% Injectable 25 Gram(s) IV Push once  dextrose 50% Injectable 12.5 Gram(s) IV Push once  dextrose 50% Injectable 25 Gram(s) IV Push once  donepezil 5 milliGRAM(s) Oral at bedtime  glucagon  Injectable 1 milliGRAM(s) IntraMuscular once  heparin   Injectable 5000 Unit(s) SubCutaneous every 12 hours  insulin glargine Injectable (LANTUS) 15 Unit(s) SubCutaneous every morning  insulin lispro (ADMELOG) corrective regimen sliding scale   SubCutaneous three times a day before meals  insulin lispro Injectable (ADMELOG) 5 Unit(s) SubCutaneous every 8 hours  pantoprazole   Suspension 40 milliGRAM(s) Oral before breakfast  polyethylene glycol 3350 17 Gram(s) Oral daily    MEDICATIONS  (PRN):  dextrose Oral Gel 15 Gram(s) Oral once PRN Blood Glucose LESS THAN 70 milliGRAM(s)/deciliter  haloperidol     Tablet 0.5 milliGRAM(s) Oral at bedtime PRN Agitation/Confusion      Allergies  No Known Allergies      Review of Systems:   limited     Physical Examination:  T(C): 36.3 (04-18-22 @ 05:02), Max: 36.3 (04-18-22 @ 05:02)  HR: 73 (04-18-22 @ 10:30) (73 - 81)  BP: 144/79 (04-18-22 @ 10:30) (133/68 - 149/62)  RR: 18 (04-18-22 @ 10:30) (18 - 18)  SpO2: 96% (04-17-22 @ 20:23) (96% - 96%)    HEENT: NG tube   Respiratory:  No signs of respiratory distress. Lung sounds are clear bilaterally.  Cardiovascular:  S1 S2, Regular rate and rhythm.  Abdominal: Abdomen is soft, symmetric, and non-tender without distention.   Extremities: contracted   Neurology: alert oriented * 0  Skin: No Jaundice.      Data: (reviewed by attending)                        9.8    6.34  )-----------( 202      ( 18 Apr 2022 07:22 )             30.6     Hgb trend:  9.8  04-18-22 @ 07:22  8.5  04-17-22 @ 06:55  9.9  04-16-22 @ 04:30        04-18    143  |  108  |  15  ----------------------------<  116<H>  4.0   |  25  |  0.7    Ca    7.3<L>      18 Apr 2022 07:22  Phos  2.2     04-18  Mg     1.7     04-18    TPro  5.4<L>  /  Alb  2.0<L>  /  TBili  0.5  /  DBili  x   /  AST  41  /  ALT  29  /  AlkPhos  141<H>  04-18    Liver panel trend:  TBili 0.5   /   AST 41   /   ALT 29   /   AlkP 141   /   Tptn 5.4   /   Alb 2.0    /   DBili --      04-18  TBili 0.4   /   AST 27   /   ALT 24   /   AlkP 138   /   Tptn 5.0   /   Alb 1.7    /   DBili --      04-17  TBili 0.4   /   AST 34   /   ALT 27   /   AlkP 150   /   Tptn 5.5   /   Alb 2.1    /   DBili --      04-16  TBili 0.7   /   AST 34   /   ALT 20   /   AlkP 144   /   Tptn 5.9   /   Alb 2.3    /   DBili --      04-15  TBili 0.9   /   AST 12   /   ALT 16   /   AlkP 155   /   Tptn 7.3   /   Alb 2.8    /   DBili --      04-13

## 2022-04-18 NOTE — CONSULT NOTE ADULT - ASSESSMENT
Assessment  88 y/o male with a PMH of uncontrolled DM, prostate ca in remission, and late stage alzheimer's presents to the ED for evaluation of failure to thrive. Surgery is being consulted now for placement of a PEG tube vs. surgical gastrostomy tube as both GI and IR have said there is no window     Plan  - Add on case for 4/19 for PEG tube placement, possible laparoscopic possible open gastrostomy tube placement  - NPO@MN  - AM labs (CBC, BMP, Mg, Phos, T&S, Coags)  - palliative care consult as patient may need to remain intubated if laparoscopic vs open surgery to be performed   - d/w Dr. Becerril

## 2022-04-18 NOTE — CONSULT NOTE ADULT - SUBJECTIVE AND OBJECTIVE BOX
GERMANIA PAGAN [id number]  87y Male    HPI:  The patient is nonverbal, AAO*0, history mainly obtained from the charts.   Tried calling Isabelle: [phone] no answer, left a voice mail, will re-attempt, family may be interested in GOC discussion, DNR/DNI status.     86 y/o male with a PMH of uncontrolled DM, prostate ca in remission, and late stage alzheimer's presents to the ED for evaluation of failure to thrive. pt is a poor historian, and Faroese speaking. I spoke with pt daughter Isabelle who reports pt lives home and has not been eating or drinking since he left the hospital in march 2022 ( few weeks ago). She reports that the pt was recently admitted 03/25-03/29 for hypoglycemia and pathological hip fracture. she reports pt is at baseline, and baseline is confused. pt daughter reports she placed a flores in him at home yesterday and she is a nurse.  (13 Apr 2022 20:30)      Surgery is being consulted now for placement of a PEG tube vs. surgical gastrostomy tube as both GI and IR have said there is no window for     PAST MEDICAL & SURGICAL HISTORY:  Prostate cancer  Dementia  Diabetes  No significant past surgical history    MEDICATIONS  (STANDING):  chlorhexidine 4% Liquid 1 Application(s) Topical daily  dextrose 5%. 1000 milliLiter(s) (100 mL/Hr) IV Continuous <Continuous>  dextrose 5%. 1000 milliLiter(s) (50 mL/Hr) IV Continuous <Continuous>  dextrose 50% Injectable 25 Gram(s) IV Push once  dextrose 50% Injectable 12.5 Gram(s) IV Push once  dextrose 50% Injectable 25 Gram(s) IV Push once  donepezil 5 milliGRAM(s) Oral at bedtime  glucagon  Injectable 1 milliGRAM(s) IntraMuscular once  heparin   Injectable 5000 Unit(s) SubCutaneous every 12 hours  insulin glargine Injectable (LANTUS) 15 Unit(s) SubCutaneous every morning  insulin lispro (ADMELOG) corrective regimen sliding scale   SubCutaneous three times a day before meals  insulin lispro Injectable (ADMELOG) 5 Unit(s) SubCutaneous every 8 hours  pantoprazole   Suspension 40 milliGRAM(s) Oral before breakfast  polyethylene glycol 3350 17 Gram(s) Oral daily    MEDICATIONS  (PRN):  dextrose Oral Gel 15 Gram(s) Oral once PRN Blood Glucose LESS THAN 70 milliGRAM(s)/deciliter  haloperidol     Tablet 0.5 milliGRAM(s) Oral at bedtime PRN Agitation/Confusion      Allergies: No Known Allergies    REVIEW OF SYSTEMS    [x] A ten-point review of systems was otherwise negative except as noted.  [ ] Due to altered mental status/intubation, subjective information were not able to be obtained from the patient. History was obtained, to the extent possible, from review of the chart and collateral sources of information.      Vital Signs Last 24 Hrs  T(C): 37.2 (18 Apr 2022 14:14), Max: 37.2 (18 Apr 2022 14:14)  T(F): 99 (18 Apr 2022 14:14), Max: 99 (18 Apr 2022 14:14)  HR: 87 (18 Apr 2022 14:14) (73 - 87)  BP: 120/55 (18 Apr 2022 14:14) (120/55 - 149/62)  RR: 18 (18 Apr 2022 14:14) (18 - 18)  SpO2: 96% (17 Apr 2022 20:23) (96% - 96%)    PHYSICAL EXAM:  GENERAL: NAD, well-appearing  CHEST/LUNG: Clear to auscultation bilaterally  HEART: Regular rate and rhythm  ABDOMEN: Soft, Nontender, Nondistended;   EXTREMITIES:  No clubbing, cyanosis, or edema      LABS:  POCT Blood Glucose.: 141 mg/dL (18 Apr 2022 11:22)  POCT Blood Glucose.: 127 mg/dL (18 Apr 2022 08:05)  POCT Blood Glucose.: 100 mg/dL (18 Apr 2022 05:27)  POCT Blood Glucose.: 103 mg/dL (17 Apr 2022 23:43)  POCT Blood Glucose.: 140 mg/dL (17 Apr 2022 21:36)  POCT Blood Glucose.: 30 mg/dL (17 Apr 2022 21:07)  POCT Blood Glucose.: 30 mg/dL (17 Apr 2022 21:06)                          9.8    6.34  )-----------( 202      ( 18 Apr 2022 07:22 )             30.6       Auto Neutrophil %: 73.9 % (04-18-22 @ 07:22)  Auto Immature Granulocyte %: 0.5 % (04-18-22 @ 07:22)    04-18    143  |  108  |  15  ----------------------------<  116<H>  4.0   |  25  |  0.7      Calcium, Total Serum: 7.3 mg/dL (04-18-22 @ 07:22)    LFTs:             5.4  | 0.5  | 41       ------------------[141     ( 18 Apr 2022 07:22 )  2.0  | x    | 29             Lactate, Blood: 3.1 mmol/L (04-16-22 @ 04:30)    RADIOLOGY & ADDITIONAL STUDIES:

## 2022-04-19 DIAGNOSIS — E11.9 TYPE 2 DIABETES MELLITUS WITHOUT COMPLICATIONS: ICD-10-CM

## 2022-04-19 DIAGNOSIS — E46 UNSPECIFIED PROTEIN-CALORIE MALNUTRITION: ICD-10-CM

## 2022-04-19 DIAGNOSIS — Z51.5 ENCOUNTER FOR PALLIATIVE CARE: ICD-10-CM

## 2022-04-19 DIAGNOSIS — G30.9 ALZHEIMER'S DISEASE, UNSPECIFIED: ICD-10-CM

## 2022-04-19 LAB
ALBUMIN SERPL ELPH-MCNC: 1.9 G/DL — LOW (ref 3.5–5.2)
ALP SERPL-CCNC: 136 U/L — HIGH (ref 30–115)
ALT FLD-CCNC: 30 U/L — SIGNIFICANT CHANGE UP (ref 0–41)
ANION GAP SERPL CALC-SCNC: 9 MMOL/L — SIGNIFICANT CHANGE UP (ref 7–14)
APTT BLD: 31.1 SEC — SIGNIFICANT CHANGE UP (ref 27–39.2)
AST SERPL-CCNC: 40 U/L — SIGNIFICANT CHANGE UP (ref 0–41)
BASOPHILS # BLD AUTO: 0.01 K/UL — SIGNIFICANT CHANGE UP (ref 0–0.2)
BASOPHILS NFR BLD AUTO: 0.2 % — SIGNIFICANT CHANGE UP (ref 0–1)
BILIRUB SERPL-MCNC: 0.5 MG/DL — SIGNIFICANT CHANGE UP (ref 0.2–1.2)
BLD GP AB SCN SERPL QL: SIGNIFICANT CHANGE UP
BUN SERPL-MCNC: 14 MG/DL — SIGNIFICANT CHANGE UP (ref 10–20)
CALCIUM SERPL-MCNC: 7 MG/DL — LOW (ref 8.5–10.1)
CHLORIDE SERPL-SCNC: 105 MMOL/L — SIGNIFICANT CHANGE UP (ref 98–110)
CO2 SERPL-SCNC: 25 MMOL/L — SIGNIFICANT CHANGE UP (ref 17–32)
CREAT SERPL-MCNC: 0.6 MG/DL — LOW (ref 0.7–1.5)
EGFR: 93 ML/MIN/1.73M2 — SIGNIFICANT CHANGE UP
EOSINOPHIL # BLD AUTO: 0.1 K/UL — SIGNIFICANT CHANGE UP (ref 0–0.7)
EOSINOPHIL NFR BLD AUTO: 1.9 % — SIGNIFICANT CHANGE UP (ref 0–8)
GLUCOSE BLDC GLUCOMTR-MCNC: 185 MG/DL — HIGH (ref 70–99)
GLUCOSE BLDC GLUCOMTR-MCNC: 204 MG/DL — HIGH (ref 70–99)
GLUCOSE BLDC GLUCOMTR-MCNC: 222 MG/DL — HIGH (ref 70–99)
GLUCOSE SERPL-MCNC: 196 MG/DL — HIGH (ref 70–99)
HCT VFR BLD CALC: 27.7 % — LOW (ref 42–52)
HGB BLD-MCNC: 8.6 G/DL — LOW (ref 14–18)
IMM GRANULOCYTES NFR BLD AUTO: 0.7 % — HIGH (ref 0.1–0.3)
INR BLD: 1.26 RATIO — SIGNIFICANT CHANGE UP (ref 0.65–1.3)
LYMPHOCYTES # BLD AUTO: 1.08 K/UL — LOW (ref 1.2–3.4)
LYMPHOCYTES # BLD AUTO: 20.2 % — LOW (ref 20.5–51.1)
MAGNESIUM SERPL-MCNC: 1.6 MG/DL — LOW (ref 1.8–2.4)
MCHC RBC-ENTMCNC: 26.5 PG — LOW (ref 27–31)
MCHC RBC-ENTMCNC: 31 G/DL — LOW (ref 32–37)
MCV RBC AUTO: 85.2 FL — SIGNIFICANT CHANGE UP (ref 80–94)
MONOCYTES # BLD AUTO: 0.45 K/UL — SIGNIFICANT CHANGE UP (ref 0.1–0.6)
MONOCYTES NFR BLD AUTO: 8.4 % — SIGNIFICANT CHANGE UP (ref 1.7–9.3)
NEUTROPHILS # BLD AUTO: 3.67 K/UL — SIGNIFICANT CHANGE UP (ref 1.4–6.5)
NEUTROPHILS NFR BLD AUTO: 68.6 % — SIGNIFICANT CHANGE UP (ref 42.2–75.2)
NRBC # BLD: 0 /100 WBCS — SIGNIFICANT CHANGE UP (ref 0–0)
PHOSPHATE SERPL-MCNC: 2.3 MG/DL — SIGNIFICANT CHANGE UP (ref 2.1–4.9)
PLATELET # BLD AUTO: 194 K/UL — SIGNIFICANT CHANGE UP (ref 130–400)
POTASSIUM SERPL-MCNC: 4.3 MMOL/L — SIGNIFICANT CHANGE UP (ref 3.5–5)
POTASSIUM SERPL-SCNC: 4.3 MMOL/L — SIGNIFICANT CHANGE UP (ref 3.5–5)
PROT SERPL-MCNC: 4.9 G/DL — LOW (ref 6–8)
PROTHROM AB SERPL-ACNC: 14.5 SEC — HIGH (ref 9.95–12.87)
RBC # BLD: 3.25 M/UL — LOW (ref 4.7–6.1)
RBC # FLD: 18.8 % — HIGH (ref 11.5–14.5)
SODIUM SERPL-SCNC: 139 MMOL/L — SIGNIFICANT CHANGE UP (ref 135–146)
WBC # BLD: 5.35 K/UL — SIGNIFICANT CHANGE UP (ref 4.8–10.8)
WBC # FLD AUTO: 5.35 K/UL — SIGNIFICANT CHANGE UP (ref 4.8–10.8)

## 2022-04-19 PROCEDURE — 99222 1ST HOSP IP/OBS MODERATE 55: CPT

## 2022-04-19 PROCEDURE — 99232 SBSQ HOSP IP/OBS MODERATE 35: CPT

## 2022-04-19 RX ADMIN — Medication 2: at 17:11

## 2022-04-19 RX ADMIN — DONEPEZIL HYDROCHLORIDE 5 MILLIGRAM(S): 10 TABLET, FILM COATED ORAL at 22:39

## 2022-04-19 RX ADMIN — Medication 2: at 12:18

## 2022-04-19 RX ADMIN — CHLORHEXIDINE GLUCONATE 1 APPLICATION(S): 213 SOLUTION TOPICAL at 12:22

## 2022-04-19 RX ADMIN — HEPARIN SODIUM 5000 UNIT(S): 5000 INJECTION INTRAVENOUS; SUBCUTANEOUS at 17:44

## 2022-04-19 NOTE — CONSULT NOTE ADULT - PROVIDER SPECIALTY LIST ADULT
Critical Care
Endocrinology
Infectious Disease
Podiatry
Surgery
Gastroenterology
Nutrition Support
Palliative Care

## 2022-04-19 NOTE — PROGRESS NOTE ADULT - ASSESSMENT
A/P 86 y/o male with a PMH of uncontrolled DM, prostate ca in remission, and late stage alzheimer's presents to the ED for evaluation of failure to thrive. Surgery is being consulted now for placement of a PEG tube vs. surgical gastrostomy tube as both GI and IR have said there is no window     Plan  - Added on case for 4/19 for PEG tube placement, possible laparoscopic possible open gastrostomy tube placement  - f/u palliative care consult as patient may need to remain intubated if laparoscopic vs open surgery to be performed   -management per primary team

## 2022-04-19 NOTE — CONSULT NOTE ADULT - NS ATTEND AMEND GEN_ALL_CORE FT
Agree with above, plans appear to be towards PEG, delayed due to COVID+ now. Will be available for family meeting as needed, reconsult PRN    ______________  Joe Guerrero MD  Palliative Medicine  [facility]  [phone]

## 2022-04-19 NOTE — CHART NOTE - NSCHARTNOTEFT_GEN_A_CORE
PALLIATIVE MEDICINE INTERDISCIPLINARY TEAM NOTE    Provider:  [   ]Social Work   [   ]          [   ] Initial visit [   ] Follow up    Family or contact name / phone #   Met with: [  x ] Patient:  patient was observed to be resting  [  x ] Family:  grandson was at bedside  [   ] Other:    Primary Language: [   ] English [   ] Other*:                      *Interpretation provided by:    SUPPORT DIAGNOSES            (Check all that apply)  [   ] Psychosocial spiritual assessment (PSSA)  [   ] EOL issues  [   ] Cultural / spiritual concerns  [ x  ] Pain / suffering  [   ] Dementia / AMS  [   ] Other:  [   ] AD issues  [ x  ] Grief / loss / sadness  [   ] Discharge issues  [ x  ] Distress / coping    PSYCHOSOCIAL ASSESSMENT OF PATIENT         (Check all that apply)  [ x  ] Initial Assessment            [   ] Reassessment          [   ] Not Applicable this visit    Pain/suffering acuity:  patient appeared to be comfortable  [   ] None to mild (0-3)           [   ] Moderate (4-6)        [   ] High (7-10)    Mental Status:  [   ] Alert/oriented (x3)          [ x  ] Confused/Altered(x2/x1)         [   ] Non-resp    Functional status:  [   ] Independent w ADLs      [   ] Needs Assistance             [  x ] Bedbound/Full Care    Coping:  unable to assess  [   ] Coping well                     [   ] Coping w/difficulty            [   ] Poor coping    Support system:  [x   ] Strong                              [   ] Adequate                        [   ] Inadequate      Past history and medications for:   unknown    [ ] Anxiety       [ ] Depression    [ ] Sleep disorders     SPIRITUAL ASSESSMENT  Hinduism/Spiritual practice: ___________________________    Role of organized Alevism:  [   ] Important                     [   ] Some (fam tradition, cultural)               [   ] None    Effects on medical care:  [   ] Yes, _____________________________________                         [   ] None    Cultural/Lutheran need:  [   ] Yes, _____________________________________                         [   ] None    Refer to Pastoral Care:  [   ] Yes           [   ] No, not at this time    SERVICE PROVIDED  [   ]PSSA                                                                             [   ]Discharge support / facilitation  [ x  ]AD / goals of care counseling                                  [   ]EOL / death / bereavement counseling  [ x  ]Counseling / support                                                [   ] Family meeting  [   ]Prayer / sacrament / ritual                                      [   ] Referral   [   ]Other                                                                       NOTE and Plan of Care (PoC):    Patient is an 87 year old male.  Patient was admitted on 4/13/22, dx:  Adult Failure to Thrive, Hyperosmolar Hyperglycemic State.  Patient has PMH of uncontrolled DM, prostate cancer in remission and late state Alzheimer's Disease.  Patient had recent hospitalization for hip fx.      Patient was observed to be resting.  Grandson was at bedside.  Provided information regarding role of Palliative Care and AD.  Grandson contacted patient's daughter, Isabelle Nsetororn:  family want patient to remain FC.

## 2022-04-19 NOTE — CONSULT NOTE ADULT - SUBJECTIVE AND OBJECTIVE BOX
GERMANIA PAGAN          MRN-[id number]              HPI:  The patient is nonverbal, AAO*0, history mainly obtained from the charts.   Tried calling Isabelle: [phone] no answer, left a voice mail, will re-attempt, family may be interested in GOC discussion, DNR/DNI status.     88 y/o male with a PMH of uncontrolled DM, prostate ca in remission, and late stage alzheimer's presents to the ED for evaluation of failure to thrive. pt is a poor historian, and Mongolian speaking. I spoke with pt daughter Isabelle who reports pt lives home and has not been eating or drinking since he left the hospital in march 2022 ( few weeks ago). She reports that the pt was recently admitted 03/25-03/29 for hypoglycemia and pathological hip fracture. she reports pt is at baseline, and baseline is confused. pt daughter reports she placed a flores in him at home yesterday and she is a nurse.  (13 Apr 2022 20:30)      PAST MEDICAL & SURGICAL HISTORY:  Prostate cancer    Dementia    Diabetes    No significant past surgical history        FAMILY HISTORY:   Reviewed and found non contributory in mother or father    SOCIAL HISTORY:   Tobacco/etoh/illicit drug use use reported. Yes [ ]  _________  No [x ]  Pt resides at: home [ ]  facility [ ]  other [ ] _______        ROS:	    Unable to attain due to:  AMS                     Dyspnea (Shayy 0-10): 0                       N/V (Y/N): No                             Secretions (Y/N) : No                                          Agitation(Y/N): No                              Pain (Y/N): No                                 -Provocation/Palliation: N/A  -Quality/Quantity: N/A  -Radiating: N/A  -Severity: No pain  -Timing/Frequency: N/A  -Impact on ADLs: N/A    General:  Denied  HEENT:    Denied  Neck:  Denied  CVS:  Denied  Resp:  Denied  GI:  Denied    :  Denied  Musc:  Denied  Neuro:  Denied  Psych:  Denied  Skin:  Denied  Lymph:  Denied    Last BM: yesterday       Allergies    No Known Allergies    Intolerances          Labs:	    CBC:                        8.6    5.35  )-----------( 194      ( 19 Apr 2022 04:30 )             27.7     CMP:    04-19    139  |  105  |  14  ----------------------------<  196<H>  4.3   |  25  |  0.6<L>    Ca    7.0<L>      19 Apr 2022 04:30  Phos  2.3     04-19  Mg     1.6     04-19    TPro  4.9<L>  /  Alb  1.9<L>  /  TBili  0.5  /  DBili  x   /  AST  40  /  ALT  30  /  AlkPhos  136<H>  04-19       PT/INR - ( 19 Apr 2022 11:00 )   PT: 14.50 sec;   INR: 1.26 ratio         PTT - ( 19 Apr 2022 11:00 )  PTT:31.1 sec           Radiology:	       EKG:	      Imaging Personally Reviewed:  [ ] YES  [ ] NO    Consultant(s) Notes Reviewed:  [ ] YES  [ ] NO  Care Discussed with Consultants/Other Providers [ ] YES  [ ] NO    PEx:	  T(C): 36.6 (04-19-22 @ 12:00), Max: 36.7 (04-18-22 @ 18:00)  HR: 86 (04-19-22 @ 12:00) (77 - 91)  BP: 131/76 (04-19-22 @ 12:00) (121/62 - 131/76)  RR: 16 (04-19-22 @ 12:00) (16 - 18)  SpO2: 96% (04-19-22 @ 12:00) (96% - 98%)  Wt(kg): --  Daily     Daily     General: elderly male  found in bed appears to have advanced illness   Eyes:  PERRL EOMI Non icteric MOM  ENMT: no external oral ulcers, MMM, no thrush   CVS: RR S1S2 No M/G/R  Resp: Unlabored Non tachypneic No increased WOB  GI:  Soft NT ND BS+ (+) NGT  :  Flores  Musc: No C/C/E    Neuro: withdraws to pain   Psych: Calm Pleasant, AAOx0  Skin: Non jaundiced , no rash   Lymph: no adenopathy     Preadmit Karnofsky:  %           Current Karnofsky:     %  http://www.npcrc.org/files/news/karnofsky_performance_scale.pdf   http://www.npcrc.org/files/news/palliative_performance_scale_PPSv2.pdf  Cachexia (Y/N): YES   BMI:      Medications:	      MEDICATIONS  (STANDING):  chlorhexidine 4% Liquid 1 Application(s) Topical daily  dextrose 10%. 250 milliLiter(s) (1000 mL/Hr) IV Continuous <Continuous>  dextrose 5%. 1000 milliLiter(s) (100 mL/Hr) IV Continuous <Continuous>  dextrose 5%. 1000 milliLiter(s) (50 mL/Hr) IV Continuous <Continuous>  dextrose 50% Injectable 50 milliLiter(s) IV Push once  dextrose 50% Injectable 25 Gram(s) IV Push once  dextrose 50% Injectable 12.5 Gram(s) IV Push once  dextrose 50% Injectable 25 Gram(s) IV Push once  donepezil 5 milliGRAM(s) Oral at bedtime  glucagon  Injectable 1 milliGRAM(s) IntraMuscular once  heparin   Injectable 5000 Unit(s) SubCutaneous every 12 hours  insulin glargine Injectable (LANTUS) 7 Unit(s) SubCutaneous every morning  insulin lispro (ADMELOG) corrective regimen sliding scale   SubCutaneous three times a day before meals  pantoprazole   Suspension 40 milliGRAM(s) Oral before breakfast  polyethylene glycol 3350 17 Gram(s) Oral daily    MEDICATIONS  (PRN):  dextrose Oral Gel 15 Gram(s) Oral once PRN Blood Glucose LESS THAN 70 milliGRAM(s)/deciliter  haloperidol     Tablet 0.5 milliGRAM(s) Oral at bedtime PRN Agitation/Confusion        Advanced Directives:	     Full Code      Decision maker: The patient is able to participate in complex medical decision making conversations.   Legal surrogate:    GOALS OF CARE DISCUSSION	       Palliative care info/counseling provided	           Family meeting scheduled         Documentation of GOC/Advanced Care Planning:       See previous Palliative Medicine Note    PSYCHOSOCIAL-SPIRITUAL ASSESSMENT:       Reviewed       See Palliative Care SW/ documentation        	    REFERRALS       Palliative Med        Unit SW/Case Mgmt              Hospice       Speech/Swallow       Nutrition       PT/OT GERMANIA PAGAN          [id number]              HPI:  The patient is nonverbal, AAO*0, history mainly obtained from the charts.   Tried calling Isabelle: [phone] no answer, left a voice mail, will re-attempt, family may be interested in GOC discussion, DNR/DNI status.     88 y/o male with a PMH of uncontrolled DM, prostate ca in remission, and late stage alzheimer's presents to the ED for evaluation of failure to thrive. pt is a poor historian, and [place]an speaking. I spoke with pt daughter Isabelle who reports pt lives home and has not been eating or drinking since he left the hospital in march 2022 ( few weeks ago). She reports that the pt was recently admitted 03/25-03/29 for hypoglycemia and pathological hip fracture. she reports pt is at baseline, and baseline is confused. pt daughter reports she placed a flores in him at home yesterday and she is a nurse.  (13 Apr 2022 20:30)      PAST MEDICAL & SURGICAL HISTORY:  Prostate cancer    Dementia    Diabetes    No significant past surgical history        FAMILY HISTORY:  Unable to obtain from patient    SOCIAL HISTORY:   Tobacco/etoh/illicit drug use use reported. Yes [ ]  _________  No [x ]  Pt resides at: home [ ]  facility [ ]  other [ ] _______        ROS:	    Unable to attain due to:  AMS                     Dyspnea (Shayy 0-10): 0                       N/V (Y/N): No                             Secretions (Y/N) : No                                          Agitation(Y/N): No                              Pain (Y/N): No                                 -Provocation/Palliation: N/A  -Quality/Quantity: N/A  -Radiating: N/A  -Severity: No pain  -Timing/Frequency: N/A  -Impact on ADLs: N/A    General:  Denied  HEENT:    Denied  Neck:  Denied  CVS:  Denied  Resp:  Denied  GI:  Denied    :  Denied  Musc:  Denied  Neuro:  Denied  Psych:  Denied  Skin:  Denied  Lymph:  Denied    Last BM: yesterday       Allergies    No Known Allergies    Intolerances          Labs:	  reviewed  CBC:                        8.6    5.35  )-----------( 194      ( 19 Apr 2022 04:30 )             27.7     CMP:    04-19    139  |  105  |  14  ----------------------------<  196<H>  4.3   |  25  |  0.6<L>    Ca    7.0<L>      19 Apr 2022 04:30  Phos  2.3     04-19  Mg     1.6     04-19    TPro  4.9<L>  /  Alb  1.9<L>  /  TBili  0.5  /  DBili  x   /  AST  40  /  ALT  30  /  AlkPhos  136<H>  04-19       PT/INR - ( 19 Apr 2022 11:00 )   PT: 14.50 sec;   INR: 1.26 ratio         PTT - ( 19 Apr 2022 11:00 )  PTT:31.1 sec           Radiology:	       EKG:	      Imaging Personally Reviewed:  [ ] YES  [ ] NO    Consultant(s) Notes Reviewed:  [ ] YES  [ ] NO  Care Discussed with Consultants/Other Providers [ ] YES  [ ] NO    PEx:	  T(C): 36.6 (04-19-22 @ 12:00), Max: 36.7 (04-18-22 @ 18:00)  HR: 86 (04-19-22 @ 12:00) (77 - 91)  BP: 131/76 (04-19-22 @ 12:00) (121/62 - 131/76)  RR: 16 (04-19-22 @ 12:00) (16 - 18)  SpO2: 96% (04-19-22 @ 12:00) (96% - 98%)  Wt(kg): --  Daily     Daily     General: elderly male  found in bed appears to have advanced illness   Eyes:  PERRL EOMI Non icteric MOM  ENMT: no external oral ulcers, MMM, no thrush   CVS: RR S1S2 No M/G/R  Resp: Unlabored Non tachypneic No increased WOB  GI:  Soft NT ND BS+ (+) NGT  :  Flores  Musc: No C/C/E    Neuro: withdraws to pain   Psych: Calm Pleasant, AAOx0  Skin: Non jaundiced , no rash   Lymph: no adenopathy     Preadmit Karnofsky:  %           Current Karnofsky:     %  http://www.npcrc.org/files/news/karnofsky_performance_scale.pdf   http://www.npcrc.org/files/news/palliative_performance_scale_PPSv2.pdf  Cachexia (Y/N): YES   BMI:      Medications:	      MEDICATIONS  (STANDING):  chlorhexidine 4% Liquid 1 Application(s) Topical daily  dextrose 10%. 250 milliLiter(s) (1000 mL/Hr) IV Continuous <Continuous>  dextrose 5%. 1000 milliLiter(s) (100 mL/Hr) IV Continuous <Continuous>  dextrose 5%. 1000 milliLiter(s) (50 mL/Hr) IV Continuous <Continuous>  dextrose 50% Injectable 50 milliLiter(s) IV Push once  dextrose 50% Injectable 25 Gram(s) IV Push once  dextrose 50% Injectable 12.5 Gram(s) IV Push once  dextrose 50% Injectable 25 Gram(s) IV Push once  donepezil 5 milliGRAM(s) Oral at bedtime  glucagon  Injectable 1 milliGRAM(s) IntraMuscular once  heparin   Injectable 5000 Unit(s) SubCutaneous every 12 hours  insulin glargine Injectable (LANTUS) 7 Unit(s) SubCutaneous every morning  insulin lispro (ADMELOG) corrective regimen sliding scale   SubCutaneous three times a day before meals  pantoprazole   Suspension 40 milliGRAM(s) Oral before breakfast  polyethylene glycol 3350 17 Gram(s) Oral daily    MEDICATIONS  (PRN):  dextrose Oral Gel 15 Gram(s) Oral once PRN Blood Glucose LESS THAN 70 milliGRAM(s)/deciliter  haloperidol     Tablet 0.5 milliGRAM(s) Oral at bedtime PRN Agitation/Confusion        Advanced Directives:	     Full Code      Decision maker: The patient is not able to participate in complex medical decision making conversations.   Legal surrogate: daughter    GOALS OF CARE DISCUSSION	       Palliative care info/counseling provided	           Family meeting scheduled         Documentation of GOC/Advanced Care Planning:       See previous Palliative Medicine Note    PSYCHOSOCIAL-SPIRITUAL ASSESSMENT:       Reviewed       See Palliative Care SW/ documentation        	    REFERRALS       Palliative Med        Unit SW/Case Mgmt              Hospice       Speech/Swallow       Nutrition       PT/OT

## 2022-04-19 NOTE — PROGRESS NOTE ADULT - SUBJECTIVE AND OBJECTIVE BOX
Procedure:  POD    PAST MEDICAL & SURGICAL HISTORY:  Prostate cancer    Dementia    Diabetes    No significant past surgical history        24H EVENTS    Vital Signs Last 24 Hrs  T(C): 36.6 (2022 05:01), Max: 37.2 (2022 14:14)  T(F): 97.9 (2022 05:01), Max: 99 (2022 14:14)  HR: 88 (2022 05:01) (80 - 91)  BP: 124/74 (2022 05:01) (120/55 - 131/68)  BP(mean): --  RR: 18 (2022 05:01) (17 - 18)  SpO2: 97% (2022 18:00) (97% - 97%)        I&O's Detail    2022 07:01  -  2022 07:00  --------------------------------------------------------  IN:    Enteral Tube Flush: 1000 mL    Glucerna: 480 mL  Total IN: 1480 mL    OUT:    Voided (mL): 200 mL  Total OUT: 200 mL    Total NET: 1280 mL                         8.6    5.35  )-----------( 194      (  @ 04:30 )             27.7                9.8    6.34  )-----------( 202      (  @ 07:22 )             30.6                    139   |  105   |  14                 Ca: 7.0    BMP:   ----------------------------< 196    M.6   (22 @ 04:30)             4.3    |  25    | 0.6                Ph: 2.3      LFT:     TPro: 4.9 / Alb: 1.9 / TBili: 0.5 / DBili: x / AST: 40 / ALT: 30 / AlkPhos: 136   (22 @ 04:30)      MEDICATIONS  (STANDING):  chlorhexidine 4% Liquid 1 Application(s) Topical daily  dextrose 10%. 250 milliLiter(s) (1000 mL/Hr) IV Continuous <Continuous>  dextrose 5%. 1000 milliLiter(s) (100 mL/Hr) IV Continuous <Continuous>  dextrose 5%. 1000 milliLiter(s) (50 mL/Hr) IV Continuous <Continuous>  dextrose 50% Injectable 50 milliLiter(s) IV Push once  dextrose 50% Injectable 25 Gram(s) IV Push once  dextrose 50% Injectable 12.5 Gram(s) IV Push once  dextrose 50% Injectable 25 Gram(s) IV Push once  donepezil 5 milliGRAM(s) Oral at bedtime  glucagon  Injectable 1 milliGRAM(s) IntraMuscular once  heparin   Injectable 5000 Unit(s) SubCutaneous every 12 hours  insulin glargine Injectable (LANTUS) 7 Unit(s) SubCutaneous every morning  insulin lispro (ADMELOG) corrective regimen sliding scale   SubCutaneous three times a day before meals  pantoprazole   Suspension 40 milliGRAM(s) Oral before breakfast  polyethylene glycol 3350 17 Gram(s) Oral daily    MEDICATIONS  (PRN):  dextrose Oral Gel 15 Gram(s) Oral once PRN Blood Glucose LESS THAN 70 milliGRAM(s)/deciliter  haloperidol     Tablet 0.5 milliGRAM(s) Oral at bedtime PRN Agitation/Confusion      Diet, NPO after Midnight:      NPO Start Date: 2022,   NPO Start Time: 23:59 (22 @ 19:32)  Diet, NPO with Tube Feed:   Tube Feeding Modality: Nasogastric  Glucerna 1.2 Karan  Total Volume for 24 Hours (mL): 720  Bolus  Total Volume of Bolus (mL):  240  Tube Feed Frequency: Every 8 hours   Tube Feed Start Time: 06:00  Bolus Feed Rate (mL per Hour): 480   Bolus Feed Duration (in Hours): 0.5  Free Water Flush  Bolus   Total Volume per Flush (mL): 250   Frequency: Every 4 Hours (22 @ 10:50)      PHYSICAL EXAM:  General Appearance: pt in  NAD  Chest: +air entry bilat  CV: S1, S2,  Abdomen: Soft, NT  Neuro: awake

## 2022-04-19 NOTE — CONSULT NOTE ADULT - CONSULT REQUESTED DATE/TIME
14-Apr-2022 09:25
18-Apr-2022 16:15
19-Apr-2022 14:17
15-Apr-2022 11:03
14-Apr-2022 00:00
14-Apr-2022 09:47
15-Apr-2022 11:19
14-Apr-2022 07:50

## 2022-04-19 NOTE — CONSULT NOTE ADULT - ASSESSMENT
87yMale being evaluated for goals of care       MEDD (morphine equivalent daily dose):      See Recs below.    Please call [phone] with questions or concerns 24/7.   We will continue to follow.    87yMale being evaluated for goals of care and symptom management. Spoke to surgery, and primary medical team. They both explained that patient has an overall poor prognosis but the daughter who is a nurse wants aggressive medical care. Patient lives with her and her son who is his caregiver.     Surgical team consulted palliative care because patient needs intubation during a PEG procedure. Palliative team said they should speak to daughter Isabelle first explain risks of intubating the patient dureing procedure and if she wants to discuss options palliative care team available.     She has not wanted palliative care discussions in past.       MEDD (morphine equivalent daily dose): 0      See Recs below.    Please call [phone] with questions or concerns 24/7.   We will continue to follow.

## 2022-04-19 NOTE — CONSULT NOTE ADULT - PROBLEM SELECTOR RECOMMENDATION 2
Full code  daughter Isabelle is decision maker  Palliative care team available if primary team or surgical team wants to have a meeting with family RE: poor prognosis

## 2022-04-19 NOTE — PROGRESS NOTE ADULT - SUBJECTIVE AND OBJECTIVE BOX
GERMANIA PAGAN 87y Male  MRN#: [id number]   CODE STATUS:________    f     SUBJECTIVE    No acute events overnight. Patient remained afebrile. Tested positive for COVID but is asymptomatic. PEG delayed until thursday.                                             ----------------------------------------------------------  OBJECTIVE  PAST MEDICAL & SURGICAL HISTORY  Prostate cancer    Dementia    Diabetes    No significant past surgical history                                              -----------------------------------------------------------  ALLERGIES:  No Known Allergies                                            ------------------------------------------------------------    HOME MEDICATIONS  Home Medications:  DONEPEZIL HCL 5 MG TABLET: 1 tab(s) orally once a day (26 Mar 2022 00:46)  Haldol 0.5 mg oral tablet: 1 tab(s) orally once a day, As Needed (02 May 2021 15:39)  Melatonin 5 mg oral tablet: 1 tab(s) orally once a day (at bedtime) (04 May 2021 16:06)                           MEDICATIONS:  STANDING MEDICATIONS  chlorhexidine 4% Liquid 1 Application(s) Topical daily  dextrose 10%. 250 milliLiter(s) IV Continuous <Continuous>  dextrose 5%. 1000 milliLiter(s) IV Continuous <Continuous>  dextrose 5%. 1000 milliLiter(s) IV Continuous <Continuous>  dextrose 50% Injectable 50 milliLiter(s) IV Push once  dextrose 50% Injectable 25 Gram(s) IV Push once  dextrose 50% Injectable 12.5 Gram(s) IV Push once  dextrose 50% Injectable 25 Gram(s) IV Push once  donepezil 5 milliGRAM(s) Oral at bedtime  glucagon  Injectable 1 milliGRAM(s) IntraMuscular once  heparin   Injectable 5000 Unit(s) SubCutaneous every 12 hours  insulin glargine Injectable (LANTUS) 7 Unit(s) SubCutaneous every morning  insulin lispro (ADMELOG) corrective regimen sliding scale   SubCutaneous three times a day before meals  pantoprazole   Suspension 40 milliGRAM(s) Oral before breakfast  polyethylene glycol 3350 17 Gram(s) Oral daily    PRN MEDICATIONS  dextrose Oral Gel 15 Gram(s) Oral once PRN  haloperidol     Tablet 0.5 milliGRAM(s) Oral at bedtime PRN                                            ------------------------------------------------------------  VITAL SIGNS: Last 24 Hours  T(C): 36.6 (19 Apr 2022 05:01), Max: 37.2 (18 Apr 2022 14:14)  T(F): 97.9 (19 Apr 2022 05:01), Max: 99 (18 Apr 2022 14:14)  HR: 88 (19 Apr 2022 05:01) (73 - 91)  BP: 124/74 (19 Apr 2022 05:01) (120/55 - 144/79)  BP(mean): --  RR: 18 (19 Apr 2022 05:01) (17 - 18)  SpO2: 97% (18 Apr 2022 18:00) (97% - 97%)      04-18-22 @ 07:01  -  04-19-22 @ 07:00  --------------------------------------------------------  IN: 1480 mL / OUT: 200 mL / NET: 1280 mL                                             --------------------------------------------------------------  LABS:                        8.6    5.35  )-----------( 194      ( 19 Apr 2022 04:30 )             27.7     04-19    139  |  105  |  14  ----------------------------<  196<H>  4.3   |  25  |  0.6<L>    Ca    7.0<L>      19 Apr 2022 04:30  Phos  2.3     04-19  Mg     1.6     04-19    TPro  4.9<L>  /  Alb  1.9<L>  /  TBili  0.5  /  DBili  x   /  AST  40  /  ALT  30  /  AlkPhos  136<H>  04-19                    PHYSICAL EXAM:    General: NAD  LUNGS: CTAB  HEART: RRR  ABDOMEN: soft, nontender  NEURO: AAOx0                                             --------------------------------------------------------------    ASSESSMENT & PLAN      # DKA, resolved  # HAGMA, resolved  # Lactic acidosis  # ?Starvation ketoacidosis, resolving  - on admission, acidotic, BHB 8.5, UA+ketones, glucose, , AG 34, Sosm 380 (elev), a1c 9.8  - started on insulin gtt, D5+NS -- transitioned to SQI this morning  - failed SLP eval 4/14, NGT in place with feeds per Dr. Rutledge  - lactic acid 4.3-->3.1. repeat pending  - endo c/s appreciated -- PTH 21, f/u TSH, T4    #Severe alzheimer disease  - pt nonverbal, AOx0  - lives at home with daughter  - poor PO intake over past several weeks, obtunded on admission  - s/p speech eval -- NPO. NGT placed.  - GOC d/w daughter by previous team in the unit - wants PEG placed and goal to take pt home(although this is not the recommendation of the current medical team)  - GI consult placed for PEG  - Per GI patient not a candidate for percutaneous PEG, will need to be done by surgery  - Surgery on board for PEG  - PEG currently scheduled for 4/21  - Preop labs and NPO after midnight on 4/20    # COVID  - Tested positive for COVID on presurgical swab  - Asymptomatic  - Continue to monitor    # Hypernatremia, RESOLVED  - Na 154->151->150-->145-->142  - free water deficit 1.6L  - free water via NGT increased to 250cc q4h  - BMP q8h to monitor for rapid correction    # Hypophosphatemia  - 1.8 -->2.6  - s/p repletement with 30mM KPhos IV    # Tachycardia  - sinus tachycardia  - still total volume depleted  - increased free water deficit as above    # Leukocytosis, RESOLVED  - persistent ~12k-->8k  - no fevers  - procal unremarkable  - will monitor off abx  - consider repeating cultures if febrile    # MANDA, resolved  - Cr 1.7 on admission,-- baseline ~0.7-0.9  - BUN/Cr ratio >25, suspect prerenal in setting of poor PO intake  - cont IVF    # R foot eschar  - s/p podiatry eval - local wound care, EZ boots, WBAT, no surgical intervention  - f/u LE arterial duplex read  - check MRSA  - procal 0.22  - f/u outpatient podiatry    # Positive UA  - BCx x2 NGTD  - UCx normal whitney  - UA with mod bacteria  - s/p ID eval - zosyn (hx of E. faecalis)  - ID f/u as cultures negative, pt afebrile, mild leukocytosis    PT/REHAB n/a  ACTIVITY: bedrest  DVT PPX: heparin   Injectable  GI PPX: pantoprazole  DIET: NPO w tube feeds  CODE: full

## 2022-04-19 NOTE — CONSULT NOTE ADULT - PROBLEM SELECTOR RECOMMENDATION 3
End stage, non verbal, does not follow commands, bed bound  24/7 total ADL care  No po intake, scheduled for PEG placement   Daughter requests PEG

## 2022-04-20 LAB
ALBUMIN SERPL ELPH-MCNC: 1.8 G/DL — LOW (ref 3.5–5.2)
ALP SERPL-CCNC: 121 U/L — HIGH (ref 30–115)
ALT FLD-CCNC: 24 U/L — SIGNIFICANT CHANGE UP (ref 0–41)
ANION GAP SERPL CALC-SCNC: 14 MMOL/L — SIGNIFICANT CHANGE UP (ref 7–14)
APTT BLD: 27.6 SEC — SIGNIFICANT CHANGE UP (ref 27–39.2)
AST SERPL-CCNC: 20 U/L — SIGNIFICANT CHANGE UP (ref 0–41)
BASOPHILS # BLD AUTO: 0.02 K/UL — SIGNIFICANT CHANGE UP (ref 0–0.2)
BASOPHILS # BLD AUTO: 0.02 K/UL — SIGNIFICANT CHANGE UP (ref 0–0.2)
BASOPHILS NFR BLD AUTO: 0.4 % — SIGNIFICANT CHANGE UP (ref 0–1)
BASOPHILS NFR BLD AUTO: 0.4 % — SIGNIFICANT CHANGE UP (ref 0–1)
BILIRUB SERPL-MCNC: 0.5 MG/DL — SIGNIFICANT CHANGE UP (ref 0.2–1.2)
BUN SERPL-MCNC: 13 MG/DL — SIGNIFICANT CHANGE UP (ref 10–20)
CALCIUM SERPL-MCNC: 7.1 MG/DL — LOW (ref 8.5–10.1)
CHLORIDE SERPL-SCNC: 102 MMOL/L — SIGNIFICANT CHANGE UP (ref 98–110)
CO2 SERPL-SCNC: 22 MMOL/L — SIGNIFICANT CHANGE UP (ref 17–32)
CREAT SERPL-MCNC: 0.7 MG/DL — SIGNIFICANT CHANGE UP (ref 0.7–1.5)
EGFR: 89 ML/MIN/1.73M2 — SIGNIFICANT CHANGE UP
EOSINOPHIL # BLD AUTO: 0.04 K/UL — SIGNIFICANT CHANGE UP (ref 0–0.7)
EOSINOPHIL # BLD AUTO: 0.11 K/UL — SIGNIFICANT CHANGE UP (ref 0–0.7)
EOSINOPHIL NFR BLD AUTO: 0.8 % — SIGNIFICANT CHANGE UP (ref 0–8)
EOSINOPHIL NFR BLD AUTO: 2.1 % — SIGNIFICANT CHANGE UP (ref 0–8)
GLUCOSE BLDC GLUCOMTR-MCNC: 189 MG/DL — HIGH (ref 70–99)
GLUCOSE BLDC GLUCOMTR-MCNC: 235 MG/DL — HIGH (ref 70–99)
GLUCOSE BLDC GLUCOMTR-MCNC: 249 MG/DL — HIGH (ref 70–99)
GLUCOSE BLDC GLUCOMTR-MCNC: 281 MG/DL — HIGH (ref 70–99)
GLUCOSE SERPL-MCNC: 229 MG/DL — HIGH (ref 70–99)
HCT VFR BLD CALC: 26.9 % — LOW (ref 42–52)
HCT VFR BLD CALC: 29.5 % — LOW (ref 42–52)
HGB BLD-MCNC: 8.4 G/DL — LOW (ref 14–18)
HGB BLD-MCNC: 9.3 G/DL — LOW (ref 14–18)
IMM GRANULOCYTES NFR BLD AUTO: 0.6 % — HIGH (ref 0.1–0.3)
IMM GRANULOCYTES NFR BLD AUTO: 0.8 % — HIGH (ref 0.1–0.3)
INR BLD: 1.14 RATIO — SIGNIFICANT CHANGE UP (ref 0.65–1.3)
LYMPHOCYTES # BLD AUTO: 1.24 K/UL — SIGNIFICANT CHANGE UP (ref 1.2–3.4)
LYMPHOCYTES # BLD AUTO: 1.3 K/UL — SIGNIFICANT CHANGE UP (ref 1.2–3.4)
LYMPHOCYTES # BLD AUTO: 23.4 % — SIGNIFICANT CHANGE UP (ref 20.5–51.1)
LYMPHOCYTES # BLD AUTO: 24.7 % — SIGNIFICANT CHANGE UP (ref 20.5–51.1)
MAGNESIUM SERPL-MCNC: 1.5 MG/DL — LOW (ref 1.8–2.4)
MCHC RBC-ENTMCNC: 27 PG — SIGNIFICANT CHANGE UP (ref 27–31)
MCHC RBC-ENTMCNC: 27 PG — SIGNIFICANT CHANGE UP (ref 27–31)
MCHC RBC-ENTMCNC: 31.2 G/DL — LOW (ref 32–37)
MCHC RBC-ENTMCNC: 31.5 G/DL — LOW (ref 32–37)
MCV RBC AUTO: 85.8 FL — SIGNIFICANT CHANGE UP (ref 80–94)
MCV RBC AUTO: 86.5 FL — SIGNIFICANT CHANGE UP (ref 80–94)
MONOCYTES # BLD AUTO: 0.43 K/UL — SIGNIFICANT CHANGE UP (ref 0.1–0.6)
MONOCYTES # BLD AUTO: 0.43 K/UL — SIGNIFICANT CHANGE UP (ref 0.1–0.6)
MONOCYTES NFR BLD AUTO: 8.1 % — SIGNIFICANT CHANGE UP (ref 1.7–9.3)
MONOCYTES NFR BLD AUTO: 8.2 % — SIGNIFICANT CHANGE UP (ref 1.7–9.3)
NEUTROPHILS # BLD AUTO: 3.37 K/UL — SIGNIFICANT CHANGE UP (ref 1.4–6.5)
NEUTROPHILS # BLD AUTO: 3.55 K/UL — SIGNIFICANT CHANGE UP (ref 1.4–6.5)
NEUTROPHILS NFR BLD AUTO: 63.8 % — SIGNIFICANT CHANGE UP (ref 42.2–75.2)
NEUTROPHILS NFR BLD AUTO: 66.7 % — SIGNIFICANT CHANGE UP (ref 42.2–75.2)
NRBC # BLD: 0 /100 WBCS — SIGNIFICANT CHANGE UP (ref 0–0)
NRBC # BLD: 0 /100 WBCS — SIGNIFICANT CHANGE UP (ref 0–0)
PHOSPHATE SERPL-MCNC: 2.2 MG/DL — SIGNIFICANT CHANGE UP (ref 2.1–4.9)
PLATELET # BLD AUTO: 420 K/UL — HIGH (ref 130–400)
POTASSIUM SERPL-MCNC: 4.2 MMOL/L — SIGNIFICANT CHANGE UP (ref 3.5–5)
POTASSIUM SERPL-SCNC: 4.2 MMOL/L — SIGNIFICANT CHANGE UP (ref 3.5–5)
PROT SERPL-MCNC: 4.9 G/DL — LOW (ref 6–8)
PROTHROM AB SERPL-ACNC: 13.1 SEC — HIGH (ref 9.95–12.87)
RBC # BLD: 3.11 M/UL — LOW (ref 4.7–6.1)
RBC # BLD: 3.44 M/UL — LOW (ref 4.7–6.1)
RBC # FLD: 18.8 % — HIGH (ref 11.5–14.5)
RBC # FLD: 19.2 % — HIGH (ref 11.5–14.5)
SARS-COV-2 RNA SPEC QL NAA+PROBE: SIGNIFICANT CHANGE UP
SODIUM SERPL-SCNC: 138 MMOL/L — SIGNIFICANT CHANGE UP (ref 135–146)
WBC # BLD: 5.27 K/UL — SIGNIFICANT CHANGE UP (ref 4.8–10.8)
WBC # BLD: 5.31 K/UL — SIGNIFICANT CHANGE UP (ref 4.8–10.8)
WBC # FLD AUTO: 5.27 K/UL — SIGNIFICANT CHANGE UP (ref 4.8–10.8)
WBC # FLD AUTO: 5.31 K/UL — SIGNIFICANT CHANGE UP (ref 4.8–10.8)

## 2022-04-20 PROCEDURE — 99232 SBSQ HOSP IP/OBS MODERATE 35: CPT

## 2022-04-20 PROCEDURE — 99233 SBSQ HOSP IP/OBS HIGH 50: CPT

## 2022-04-20 RX ORDER — SODIUM CHLORIDE 9 MG/ML
1000 INJECTION, SOLUTION INTRAVENOUS
Refills: 0 | Status: DISCONTINUED | OUTPATIENT
Start: 2022-04-20 | End: 2022-04-21

## 2022-04-20 RX ORDER — MAGNESIUM SULFATE 500 MG/ML
2 VIAL (ML) INJECTION ONCE
Refills: 0 | Status: COMPLETED | OUTPATIENT
Start: 2022-04-20 | End: 2022-04-20

## 2022-04-20 RX ADMIN — Medication 25 GRAM(S): at 14:35

## 2022-04-20 RX ADMIN — Medication 2: at 08:00

## 2022-04-20 RX ADMIN — HEPARIN SODIUM 5000 UNIT(S): 5000 INJECTION INTRAVENOUS; SUBCUTANEOUS at 05:47

## 2022-04-20 RX ADMIN — POLYETHYLENE GLYCOL 3350 17 GRAM(S): 17 POWDER, FOR SOLUTION ORAL at 14:35

## 2022-04-20 RX ADMIN — INSULIN GLARGINE 7 UNIT(S): 100 INJECTION, SOLUTION SUBCUTANEOUS at 14:35

## 2022-04-20 RX ADMIN — DONEPEZIL HYDROCHLORIDE 5 MILLIGRAM(S): 10 TABLET, FILM COATED ORAL at 22:07

## 2022-04-20 RX ADMIN — Medication 2: at 11:27

## 2022-04-20 RX ADMIN — CHLORHEXIDINE GLUCONATE 1 APPLICATION(S): 213 SOLUTION TOPICAL at 14:35

## 2022-04-20 NOTE — PROGRESS NOTE ADULT - ASSESSMENT
ASSESSMENT:  88 y/o male with a PMH of uncontrolled DM, prostate ca in remission, and late stage alzheimer's presents to the ED for evaluation of failure to thrive. Surgery is being consulted now for placement of a PEG tube vs. surgical gastrostomy tube as both GI and IR have said there is no window     PLAN:  - Management per primary team   - Booked for 4/21 for PEG tube placement, possible laparoscopic, possible open gastrostomy tube placement  - Pre op: CBC, BMP, Mg, Phos, Coags, T/S, COVID, EKG (on this admission), CXR (on this admission)      BLUE TEAM SPECTRA: [id number]

## 2022-04-20 NOTE — CDI QUERY NOTE - NSCDIOTHERTXTBX_GEN_ALL_CORE_HH
CLINICAL INDICATORS:    H&P Adult: History of Present Illness:  presents to the ED for evaluation of  failure to thrive ...    4/15  Advanced Practice Nurse Consult-Wound Ostomy Care: Patient bedbound, very  limited mobility in bed-immobile, severely contracted to BLEs, + flores,  incontinent of mushy stool during WOCN visit.    Results:    Nursing Flowsheet: Basic Mobility: needing total assistance                                        Daily Activity: needing total assistance        Based on your professional judgment and the clinical indicators, please  clarify if bedbound can be further specified as:    *  Functional Quadriplegia   *  Complete immobility due to frailty   *  Complete immobility due to severe physical disability   *  Other (please specify)    *  Unable to further clarify bedbound      Thank you,    Kaci Paz RN BSN             [phone].
CLINICAL INDICATORS:      H&P Adult: History of Present Illness:  presents to the ED for evaluation of  failure to thrive ...    4/15  Advanced Practice Nurse Consult-Wound Ostomy Care: Patient bedbound, very  limited mobility in bed-immobile, severely contracted to BLEs, + flores,  incontinent of mushy stool during WOCN visit.    Results:    Nursing Flowsheet: Basic Mobility: needing total assistance                                          Daily Activity: needing total assistance        Based on your professional judgment and the clinical indicators, please  clarify if bedbound can be further specified as:    *  Functional Quadriplegia   *  Complete immobility due to frailty   *  Complete immobility due to severe physical disability   *  Other (please specify)    *  Unable to further clarify bedbound      Thank you,    Kaci Paz RN BSN             [phone]

## 2022-04-20 NOTE — PROGRESS NOTE ADULT - ASSESSMENT
ASSESSMENT  88 y/o male with a PMH of uncontrolled DM, prostate ca in remission, and late stage alzheimer's presents to the ED for evaluation of failure to thrive. ID consulted for LE ulcer    IMPRESSION  #COVID19 PCR + , on RA  #SIRS on admission P>90 WBC 16, ruled out severe sepsis, lactic acidosis    UA WBC 10-25, UCX NG    CXR no PNA    10/2021 e. faecalis  #LE dry gangrene - no infection  #DM with DKA  #Left basilar subsegmental atelectasis.  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  #QTC Calculation(Bazett) 501 ms  #MANDA  Creatinine, Serum: 1.8 (04-14-22 @ 04:43)    Weight (kg): 44.4 (04-14-22 @ 00:39)    RECOMMENDATIONS  - Unknown vaccination status- if unvaccinated can give RDV Day 1 200mg, Day 2-3 100mg (SUSANA study)  - Surgery per Hospital infection control protocol (#2611)    Recall ID PRN    If any questions, please call or send a message on [employer] Teams  Please continue to update ID with any pertinent new laboratory or radiographic findings  Spectra [id number]

## 2022-04-20 NOTE — PROGRESS NOTE ADULT - SUBJECTIVE AND OBJECTIVE BOX
GENERAL SURGERY PROGRESS NOTE    Patient: GERMANIA PAGAN , 87y ([birth year])Male   MRN: [id number]  Location: [address]  Visit: 04-13-22 Inpatient  Date: 04-20-22 @ 09:40    Hospital Day #:8    Events of past 24 hours: No acute events overnight.     PAST MEDICAL & SURGICAL HISTORY:  Prostate cancer    Dementia    Diabetes    No significant past surgical history        Vitals:   T(F): 98.5 (04-20-22 @ 05:06), Max: 98.5 (04-20-22 @ 05:06)  HR: 94 (04-20-22 @ 05:06)  BP: 116/61 (04-20-22 @ 05:06)  RR: 18 (04-20-22 @ 05:06)  SpO2: 97% (04-19-22 @ 16:00)      Diet, NPO with Tube Feed:   Tube Feeding Modality: Nasogastric  Glucerna 1.2 Karan  Total Volume for 24 Hours (mL): 720  Bolus  Total Volume of Bolus (mL):  240  Tube Feed Frequency: Every 8 hours   Tube Feed Start Time: 06:00  Bolus Feed Rate (mL per Hour): 480   Bolus Feed Duration (in Hours): 0.5  Free Water Flush  Bolus   Total Volume per Flush (mL): 250   Frequency: Every 4 Hours      Fluids:     I & O's:    04-19-22 @ 07:01  -  04-20-22 @ 07:00  --------------------------------------------------------  IN:  Total IN: 0 mL    OUT:    Indwelling Catheter - Urethral (mL): 0 mL  Total OUT: 0 mL    Total NET: 0 mL        Bowel Movement: : [] YES [] NO  Flatus: : [] YES [] NO    PHYSICAL EXAM:  General Appearance: pt in  NAD  Chest: +air entry bilat  CV: S1, S2,  Abdomen: Soft, NT  Neuro: awake     MEDICATIONS  (STANDING):  chlorhexidine 4% Liquid 1 Application(s) Topical daily  dextrose 10%. 250 milliLiter(s) (1000 mL/Hr) IV Continuous <Continuous>  dextrose 5%. 1000 milliLiter(s) (100 mL/Hr) IV Continuous <Continuous>  dextrose 5%. 1000 milliLiter(s) (50 mL/Hr) IV Continuous <Continuous>  dextrose 50% Injectable 50 milliLiter(s) IV Push once  dextrose 50% Injectable 25 Gram(s) IV Push once  dextrose 50% Injectable 12.5 Gram(s) IV Push once  dextrose 50% Injectable 25 Gram(s) IV Push once  General Appearance: pt in  NAD  Chest: +air entry bilat  CV: S1, S2,  Abdomen: Soft, NT  Neuro: awake donepezil 5 milliGRAM(s) Oral at bedtime  glucagon  Injectable 1 milliGRAM(s) IntraMuscular once  heparin   Injectable 5000 Unit(s) SubCutaneous every 12 hours  insulin glargine Injectable (LANTUS) 7 Unit(s) SubCutaneous every morning  insulin lispro (ADMELOG) corrective regimen sliding scale   SubCutaneous three times a day before meals  pantoprazole   Suspension 40 milliGRAM(s) Oral before breakfast  polyethylene glycol 3350 17 Gram(s) Oral daily    MEDICATIONS  (PRN):  dextrose Oral Gel 15 Gram(s) Oral once PRN Blood Glucose LESS THAN 70 milliGRAM(s)/deciliter  haloperidol     Tablet 0.5 milliGRAM(s) Oral at bedtime PRN Agitation/Confusion      DVT PROPHYLAXIS: heparin   Injectable 5000 Unit(s) SubCutaneous every 12 hours    GI PROPHYLAXIS: pantoprazole   Suspension 40 milliGRAM(s) Oral before breakfast    ANTICOAGULATION:   ANTIBIOTICS:            LAB/STUDIES:  Labs:  CAPILLARY BLOOD GLUCOSE      POCT Blood Glucose.: 235 mg/dL (20 Apr 2022 08:47)  POCT Blood Glucose.: 204 mg/dL (19 Apr 2022 16:37)  POCT Blood Glucose.: 222 mg/dL (19 Apr 2022 12:02)                          8.4    5.27  )-----------( 420      ( 20 Apr 2022 07:35 )             26.9       Auto Neutrophil %: 63.8 % (04-20-22 @ 07:35)  Auto Immature Granulocyte %: 0.8 % (04-20-22 @ 07:35)    04-20    138  |  102  |  13  ----------------------------<  229<H>  4.2   |  22  |  0.7      Calcium, Total Serum: 7.1 mg/dL (04-20-22 @ 07:35)      LFTs:             4.9  | 0.5  | 20       ------------------[121     ( 20 Apr 2022 07:35 )  1.8  | x    | 24          Lipase:x      Amylase:x             Coags:     14.50  ----< 1.26    ( 19 Apr 2022 11:00 )     31.1                            IMAGING:

## 2022-04-20 NOTE — PROGRESS NOTE ADULT - SUBJECTIVE AND OBJECTIVE BOX
GERMANIA PAGAN  87y, Male  Allergy: No Known Allergies      LOS  7d    CHIEF COMPLAINT: Failure to Thrive (19 Apr 2022 14:16)      INTERVAL EVENTS/HPI  - No acute events overnight, COVID19 PCR + , on RA  - T(F): , Max: 98.5 (04-20-22 @ 05:06)  - Tolerating medication  - WBC Count: 5.27 (04-20-22 @ 07:35)  WBC Count: 5.35 (04-19-22 @ 04:30)     - Creatinine, Serum: 0.7 (04-20-22 @ 07:35)  Creatinine, Serum: 0.6 (04-19-22 @ 04:30)       ROS  unable to obtain history secondary to patient's mental status and/or sedation     VITALS:  T(F): 98.5, Max: 98.5 (04-20-22 @ 05:06)  HR: 94  BP: 116/61  RR: 18Vital Signs Last 24 Hrs  T(C): 36.9 (20 Apr 2022 05:06), Max: 36.9 (20 Apr 2022 05:06)  T(F): 98.5 (20 Apr 2022 05:06), Max: 98.5 (20 Apr 2022 05:06)  HR: 94 (20 Apr 2022 05:06) (81 - 94)  BP: 116/61 (20 Apr 2022 05:06) (116/61 - 131/76)  BP(mean): --  RR: 18 (20 Apr 2022 05:06) (16 - 18)  SpO2: 97% (19 Apr 2022 16:00) (96% - 97%)    PHYSICAL EXAM:  Gen: chronically ill appearing NAD  HEENT: Normocephalic, atraumatic  Neck: supple, no lymphadenopathy  CV: Regular rate & regular rhythm  Lungs: decreased BS at bases, no fremitus  Abdomen: Soft, BS present  Ext: Warm, well perfused, R foot with dry gangrene   Neuro: non focal, awake  Skin: no rash, no erythema  Lines: no phlebitis       FH: Non-contributory  Social Hx: Non-contributory    TESTS & MEASUREMENTS:                        8.4    5.27  )-----------( 420      ( 20 Apr 2022 07:35 )             26.9     04-20    138  |  102  |  13  ----------------------------<  229<H>  4.2   |  22  |  0.7    Ca    7.1<L>      20 Apr 2022 07:35  Phos  2.2     04-20  Mg     1.5     04-20    TPro  4.9<L>  /  Alb  1.8<L>  /  TBili  0.5  /  DBili  x   /  AST  20  /  ALT  24  /  AlkPhos  121<H>  04-20      LIVER FUNCTIONS - ( 20 Apr 2022 07:35 )  Alb: 1.8 g/dL / Pro: 4.9 g/dL / ALK PHOS: 121 U/L / ALT: 24 U/L / AST: 20 U/L / GGT: x               Culture - Urine (collected 04-13-22 @ 17:55)  Source: Clean Catch Clean Catch (Midstream)  Final Report (04-14-22 @ 22:58):    <10,000 CFU/mL Normal Urogenital Pinky    Culture - Blood (collected 04-13-22 @ 13:14)  Source: .Blood Blood-Peripheral  Final Report (04-18-22 @ 22:00):    No Growth Final    Culture - Blood (collected 04-13-22 @ 13:14)  Source: .Blood Blood-Peripheral  Final Report (04-18-22 @ 22:00):    No Growth Final        Lactate, Blood: 3.1 mmol/L (04-16-22 @ 04:30)  Lactate, Blood: 3.1 mmol/L (04-15-22 @ 12:13)      INFECTIOUS DISEASES TESTING  COVID-19 PCR: Detected (04-18-22 @ 19:33)  Procalcitonin, Serum: 0.22 (04-14-22 @ 15:56)  COVID-19 PCR: NotDetec (04-13-22 @ 18:49)  COVID-19 PCR: NotDetec (03-25-22 @ 16:34)  Rapid RVP Result: NotDetec (10-12-21 @ 20:16)  COVID-19 PCR: NotDetec (05-02-21 @ 13:37)  strept    INFLAMMATORY MARKERS      RADIOLOGY & ADDITIONAL TESTS:  I have personally reviewed the last available Chest xray  CXR  Xray Chest 1 View- PORTABLE-Urgent:   ACC: [id number] EXAM:  XR CHEST PORTABLE URGENT 1V                          PROCEDURE DATE:  04/17/2022          INTERPRETATION:  INDICATION: Nasogastric tube placement.    TECHNIQUE/POSITIONING: Single frontal view of the chest.    COMPARISON: Chestradiograph 4/15/2022    FINDINGS/  IMPRESSION:    SUPPORT DEVICES: Feeding tube coursing into the abdomen below the   field-of-view.    CARDIAC/MEDIASTINUM/HILUM: Stable.    LUNG PARENCHYMA/PLEURA: Patient chin obscures evaluation of the lung   apices. No focal pulmonary consolidation. No definite pneumothorax    SKELETON/SOFT TISSUES: Stable.    --- End of Report ---          STEPHANIE MARI MD; Resident Radiologist  This document has been electronically signed.  JUANITA SEGURA MD; Attending Radiologist  This document has been electronically signed. Apr 18 2022  8:55AM (04-17-22 @ 22:07)      CT      CARDIOLOGY TESTING  12 Lead ECG:   Ventricular Rate 108 BPM    Atrial Rate 108 BPM    P-R Interval 114 ms    QRS Duration 112 ms    Q-T Interval 374 ms    QTC Calculation(Bazett) 501 ms    P Axis 76 degrees    R Axis -16 degrees    T Axis 136 degrees    Diagnosis Line Sinus tachycardia  Right bundle branch block  T wave abnormality, consider lateral ischemia  Abnormal ECG    Confirmed by DEX JORGE MD ([id number]) on 4/13/2022 11:18:50 PM (04-13-22 @ 20:45)  12 Lead ECG:   Ventricular Rate 122 BPM    Atrial Rate 122 BPM    P-R Interval 126 ms    QRS Duration 116 ms    Q-T Interval 388 ms    QTC Calculation(Bazett) 552 ms    P Axis 29 degrees    R Axis 73 degrees    T Axis 118 degrees    Diagnosis Line Sinus tachycardia  Right bundle branch block  Lateral infarct , age undetermined  Abnormal ECG    Confirmed by DEX JORGE MD ([id number]) on 4/13/2022 11:18:46 PM (04-13-22 @ 14:33)      MEDICATIONS  chlorhexidine 4% Liquid 1 Topical daily  dextrose 10%. 250 IV Continuous <Continuous>  dextrose 5%. 1000 IV Continuous <Continuous>  dextrose 5%. 1000 IV Continuous <Continuous>  dextrose 50% Injectable 50 IV Push once  dextrose 50% Injectable 25 IV Push once  dextrose 50% Injectable 12.5 IV Push once  dextrose 50% Injectable 25 IV Push once  donepezil 5 Oral at bedtime  glucagon  Injectable 1 IntraMuscular once  heparin   Injectable 5000 SubCutaneous every 12 hours  insulin glargine Injectable (LANTUS) 7 SubCutaneous every morning  insulin lispro (ADMELOG) corrective regimen sliding scale  SubCutaneous three times a day before meals  pantoprazole   Suspension 40 Oral before breakfast  polyethylene glycol 3350 17 Oral daily      WEIGHT  Weight (kg): 44.4 (04-14-22 @ 00:39)  Creatinine, Serum: 0.7 mg/dL (04-20-22 @ 07:35)      ANTIBIOTICS:      All available historical records have been reviewed

## 2022-04-20 NOTE — PRE-ANESTHESIA EVALUATION ADULT - NSANTHPMHFT_GEN_ALL_CORE
88 y/o male with a PMH of uncontrolled DM, prostate ca in remission, and late stage alzheimer's presents to the ED for evaluation of failure to thrive complicated for DKA.     # DKA, resolved  # HAGMA, resolved  # Lactic acidosis  # Starvation ketoacidosis, resolving  - on admission, acidotic, BHB 8.5, UA+ketones, glucose, , AG 34, Sosm 380 (elev), a1c 9.8  - started on insulin gtt, transitioned to SQ insulin  - failed SLP eval 4/14, NGT in place with feeds per Dr. Hamilton  - lactic acid 4.3-->3.1. repeat pending  - endo c/s appreciated -- PTH 21, f/u TSH, T4    #Severe alzheimer disease  - pt nonverbal, AOx0  - lives at home with daughter  - poor PO intake over past several weeks, obtunded on admission  - s/p speech eval -- NPO. NGT placed.  - GOC d/w daughter by previous team in the unit - wants PEG placed and goal to take pt home  - Per GI patient not a candidate for percutaneous PEG, will need to be done by surgery  - Surgery on board for PEG  - PEG currently scheduled for 4/21  - Preop labs and NPO after midnight on 4/20  - Per surgery patient may need to remain intubated after the case if case is converted to open    # COVID  - Tested positive for COVID on presurgical swab  - Asymptomatic  - Continue to monitor    # Hypernatremia, RESOLVED  - Na 154->151->150-->145-->142  - free water via NGT increased to 250cc q4h    #Hypomagnesemia- replete prn     # Hypophosphatemia- replete prn     # Leukocytosis, RESOLVED    # MNADA, resolved    # R foot eschar  - s/p podiatry eval - local wound care, EZ boots, WBAT, no surgical intervention  - f/u LE arterial duplex read- limited study but negative   - f/u outpatient podiatry    #Functional Quadriplegia   - bedbound, contracted

## 2022-04-20 NOTE — PROGRESS NOTE ADULT - ATTENDING COMMENTS
***My note supersedes any discrepancies that may be above in the resident's note***    88 y/o male with a PMH of uncontrolled DM, prostate ca in remission, and late stage alzheimer's presents to the ED for evaluation of failure to thrive complicated for DKA.     GENERAL: chronically ill appearing M, frail  ENT: NGT in place with feeds running  PSYCH: no agitation, baseline mentation  NERVOUS SYSTEM:  Alert & Oriented X0, unable to follow commands  PULMONARY: no accessory muscle use, difficult to auscultate given position in bed  CARDIOVASCULAR: Regular rate and rhythm; No murmurs, rubs, or gallops  GI: Soft, Nontender, Nondistended; Bowel sounds present  EXTREMITIES:  contorted and lying in left lateral decubitus position  SKIN: No rashes or lesions      # Asymptomatic COVID  - incidentally found for routine swab pre-OR  - asymptomatic  - PEG delayed till Thrusday 4/21    # DKA, resolved  # HAGMA, resolved  # Lactic acidosis  # ?Starvation ketoacidosis, resolving  - on admission, acidotic, BHB 8.5, UA+ketones, glucose, , AG 34, Sosm 380 (elev), a1c 9.8  - started on insulin gtt, D5+NS -- transitioned to SQI this morning  - failed SLP eval 4/14, NGT in place with feeds per Dr. Rutledge  - lactic acid 4.3-->3.1. repeat pending  - endo c/s appreciated -- PTH 21, f/u TSH, T4    # Severe alzheimer disease  - pt nonverbal, AOx0  - lives at home with daughter  - poor PO intake over past several weeks, obtunded on admission  - s/p speech eval -- NPO. NGT placed.  - GOC d/w daughter by previous team in the unit - wants PEG placed and goal to take pt home(although this is not the recommendation of the current medical team)  - GI consult placed for PEG    # Hypernatremia, RESOLVED  - Na 154->151->150-->145-->142  - free water deficit 1.6L  - IVF with D5@75cc/hr  - free water via NGT increased to 250cc q4h  - BMP q8h to monitor for rapid correction    # Hypophosphatemia  - 1.8 -->2.6  - s/p repletement with 30mM KPhos IV    # Tachycardia  - sinus tachycardia  - still total volume depleted  - increased free water deficit as above    # Leukocytosis, RESOLVED  - persistent ~12k-->8k  - no fevers  - procal unremarkable  - will monitor off abx  - consider repeating cultures if febrile    # MANDA, resolved  - Cr 1.7 on admission,-- baseline ~0.7-0.9  - BUN/Cr ratio >25, suspect prerenal in setting of poor PO intake  - cont IVF    # R foot eschar  - s/p podiatry eval - local wound care, EZ boots, WBAT, no surgical intervention  - f/u LE arterial duplex read  - check MRSA  - procal 0.22  - f/u outpatient podiatry    # Positive UA  - BCx x2 NGTD  - UCx normal whitney  - UA with mod bacteria  - s/p ID eval - zosyn (hx of E. faecalis)  - ID f/u as cultures negative, pt afebrile, mild leukocytosis    PT/REHAB n/a  ACTIVITY: bedrest  DVT PPX: heparin   Injectable  GI PPX: pantoprazole  DIET: NPO w tube feeds  CODE: full    #Progress Note Handoff  Pending (specify): PEG placement. 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
88 y/o male with a PMH of uncontrolled DM, prostate ca in remission, and late stage alzheimer's presents to the ED for evaluation of failure to thrive complicated for DKA.     # DKA, resolved  # HAGMA, resolved  # Lactic acidosis  # Starvation ketoacidosis, resolving  - on admission, acidotic, BHB 8.5, UA+ketones, glucose, , AG 34, Sosm 380 (elev), a1c 9.8  - started on insulin gtt, transitioned to SQ insulin  - failed SLP eval 4/14, NGT in place with feeds per Dr. Hamilton  - lactic acid 4.3-->3.1. repeat pending  - endo c/s appreciated -- PTH 21, f/u TSH, T4    #Severe alzheimer disease  - pt nonverbal, AOx0  - lives at home with daughter  - poor PO intake over past several weeks, obtunded on admission  - s/p speech eval -- NPO. NGT placed.  - GOC d/w daughter by previous team in the unit - wants PEG placed and goal to take pt home  - Per GI patient not a candidate for percutaneous PEG, will need to be done by surgery  - Surgery on board for PEG  - PEG currently scheduled for 4/21  - Preop labs and NPO after midnight on 4/20  - Per surgery patient may need to remain intubated after the case if case is converted to open    # COVID  - Tested positive for COVID on presurgical swab  - Asymptomatic  - Continue to monitor    # Hypernatremia, RESOLVED  - Na 154->151->150-->145-->142  - free water via NGT increased to 250cc q4h    #Hypomagnesemia- replete prn     # Hypophosphatemia- replete prn     # Leukocytosis, RESOLVED    # MANDA, resolved    # R foot eschar  - s/p podiatry eval - local wound care, EZ boots, WBAT, no surgical intervention  - f/u LE arterial duplex read- limited study but negative   - f/u outpatient podiatry    #Functional Quadriplegia   - bedbound, contracted    heparin sq     #Progress Note Handoff:  Pending (specify): peg tomorrow  Family discussion: d/w daughter Isabelle at length   Disposition: Home___/SNF___/Other________/Unknown at this time________    Total time spent to complete patient's bedside assessment, review medical chart, discuss medical plan of care with covering medical team was more than 35 minutes  with >50% of time spent face to face with patient, discussion with patient/family and/or coordination of care    Sakshi Mora DO
Pt examined  labs and images reviewed  will schedule for lap g tube  palliative consult
***My note supersedes any discrepancies that may be above in the resident's note***    88 y/o male with a PMH of uncontrolled DM, prostate ca in remission, and late stage alzheimer's presents to the ED for evaluation of failure to thrive complicated for DKA.     GENERAL: chronically ill appearing M, frail  ENT: NGT in place with feeds running  PSYCH: no agitation, baseline mentation  NERVOUS SYSTEM:  Alert & Oriented X0, unable to follow commands  PULMONARY: no accessory muscle use, difficult to auscultate given position in bed  CARDIOVASCULAR: Regular rate and rhythm; No murmurs, rubs, or gallops  GI: Soft, Nontender, Nondistended; Bowel sounds present  EXTREMITIES:  contorted and lying in left lateral decubitus position  SKIN: No rashes or lesions    # DKA, resolved  # HAGMA, resolved  # Lactic acidosis  # ?Starvation ketoacidosis, resolving  - on admission, acidotic, BHB 8.5, UA+ketones, glucose, , AG 34, Sosm 380 (elev), a1c 9.8  - started on insulin gtt, D5+NS -- transitioned to SQI this morning  - failed SLP eval 4/14, NGT in place with feeds per Dr. Rutledge  - lactic acid 4.3-->3.1. repeat pending  - endo c/s appreciated -- PTH 21, f/u TSH, T4    # Severe alzheimer disease  - pt nonverbal, AOx0  - lives at home with daughter  - poor PO intake over past several weeks, obtunded on admission  - s/p speech eval -- NPO. NGT placed.  - GOC d/w daughter by previous team in the unit - wants PEG placed and goal to take pt home(although this is not the recommendation of the current medical team)  - GI consult placed for PEG    # Hypernatremia, RESOLVED  - Na 154->151->150-->145-->142  - free water deficit 1.6L  - IVF with D5@75cc/hr  - free water via NGT increased to 250cc q4h  - BMP q8h to monitor for rapid correction    # Hypophosphatemia  - 1.8 -->2.6  - s/p repletement with 30mM KPhos IV    # Tachycardia  - sinus tachycardia  - still total volume depleted  - increased free water deficit as above    # Leukocytosis, RESOLVED  - persistent ~12k-->8k  - no fevers  - procal unremarkable  - will monitor off abx  - consider repeating cultures if febrile    # MANDA, resolved  - Cr 1.7 on admission,-- baseline ~0.7-0.9  - BUN/Cr ratio >25, suspect prerenal in setting of poor PO intake  - cont IVF    # R foot eschar  - s/p podiatry eval - local wound care, EZ boots, WBAT, no surgical intervention  - f/u LE arterial duplex read  - check MRSA  - procal 0.22  - f/u outpatient podiatry    # Positive UA  - BCx x2 NGTD  - UCx normal whitney  - UA with mod bacteria  - s/p ID eval - zosyn (hx of E. faecalis)  - ID f/u as cultures negative, pt afebrile, mild leukocytosis    PT/REHAB n/a  ACTIVITY: bedrest  DVT PPX: heparin   Injectable  GI PPX: pantoprazole  DIET: NPO w tube feeds  CODE: full    #Progress Note Handoff  Pending (specify): PEG placement. 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
Pt examined  labs and images reviewed  will schedule for lap g tube  palliative consult
***My note supersedes any discrepancies that may be above in the resident's note***    86 y/o male with a PMH of uncontrolled DM, prostate ca in remission, and late stage alzheimer's presents to the ED for evaluation of failure to thrive complicated for DKA.     # DKA, resolved  # HAGMA, resolved  # Lactic acidosis  # ?Starvation ketoacidosis, resolving  - on admission, acidotic, BHB 8.5, UA+ketones, glucose, , AG 34, Sosm 380 (elev), a1c 9.8  - started on insulin gtt, D5+NS -- transitioned to SQI this morning  - failed SLP eval 4/14, NGT in place with feeds per Dr. Rutledge  - lactic acid 4.3-->3.1. repeat pending  - endo c/s appreciated -- PTH 21, f/u TSH, T4    # Severe alzheimer disease  - pt nonverbal, AOx0  - lives at home with daughter  - poor PO intake over past several weeks, obtunded on admission  - s/p speech eval -- NPO. NGT placed.  - GOC d/w daughter by previous team in the unit - wants PEG placed and goal to take pt home(although this is not the recommendation of the current medical team)  - GI consult placed for PEG      # Hypernatremia  - Na 154->151->150 and persistent  - free water deficit 1.6L  - IVF with D5@75cc/hr  - free water via NGT increased to 250cc q4h  - BMP q8h to monitor for rapid correction    # Hypophosphatemia  - 1.8   - replete 30mM KPhos IV    # Tachycardia  - sinus tachycardia  - still total volume depleted  - increased free water deficit as above    # Leukocytosis  - persistent ~12k  - no fevers  - procal unremarkable  - will monitor off abx  - consider repeating cultures if febrile    # MANDA, resolved  - Cr 1.7 on admission,-- baseline ~0.7-0.9  - BUN/Cr ratio >25, suspect prerenal in setting of poor PO intake  - cont IVF    # R foot eschar  - s/p podiatry eval - local wound care, EZ boots, WBAT, no surgical intervention  - f/u LE arterial duplex read  - check MRSA  - procal 0.22  - f/u outpatient podiatry    # Positive UA  - BCx x2 NGTD  - UCx normal whitney  - UA with mod bacteria  - s/p ID eval - zosyn (hx of E. faecalis)  - ID f/u as cultures negative, pt afebrile, mild leukocytosis    PT/REHAB n/a  ACTIVITY: bedrest  DVT PPX: heparin   Injectable  GI PPX: pantoprazole  DIET: NPO w tube feeds  CODE: full    #Progress Note Handoff  Pending (specify): PEG  Family discussion: clinical improvement  Disposition:   -daughter wants pt home (is nurse, grandchild is paid home caretaker)
As described above, we are consulted to evaluate the patient for PEG tube placement.  Colon is interposed , and endoscopic placement is not possible.  See above for details  I have reviewed  the above note and agree with the impressions and findings and recommendations

## 2022-04-20 NOTE — PROGRESS NOTE ADULT - TIME BILLING
charting, physical examination, family update with daughter, and interdisciplinary planning.
I have personally seen and examined this patient.  I have reviewed all pertinent clinical information and reviewed all relevant imaging and diagnostic studies personally.   I discussed recommendations with the primary team.
charting, resident teaching rounds, and interdisciplinary planning.
charting, resident teaching rounds, and interdisciplinary planning.

## 2022-04-20 NOTE — PROGRESS NOTE ADULT - SUBJECTIVE AND OBJECTIVE BOX
GERMANIA PAGAN 87y Male  MRN#: [id number]   CODE STATUS:________      SUBJECTIVE    No acute events overnight. Patient's mental status remains unchanged. Scheduled for PEG tube placement with surgery tomorrow.                                           ----------------------------------------------------------  OBJECTIVE  PAST MEDICAL & SURGICAL HISTORY  Prostate cancer    Dementia    Diabetes    No significant past surgical history                                              -----------------------------------------------------------  ALLERGIES:  No Known Allergies                                            ------------------------------------------------------------    HOME MEDICATIONS  Home Medications:  DONEPEZIL HCL 5 MG TABLET: 1 tab(s) orally once a day (26 Mar 2022 00:46)  Haldol 0.5 mg oral tablet: 1 tab(s) orally once a day, As Needed (02 May 2021 15:39)  Melatonin 5 mg oral tablet: 1 tab(s) orally once a day (at bedtime) (04 May 2021 16:06)                           MEDICATIONS:  STANDING MEDICATIONS  chlorhexidine 4% Liquid 1 Application(s) Topical daily  dextrose 10%. 250 milliLiter(s) IV Continuous <Continuous>  dextrose 5%. 1000 milliLiter(s) IV Continuous <Continuous>  dextrose 5%. 1000 milliLiter(s) IV Continuous <Continuous>  dextrose 50% Injectable 50 milliLiter(s) IV Push once  dextrose 50% Injectable 25 Gram(s) IV Push once  dextrose 50% Injectable 12.5 Gram(s) IV Push once  dextrose 50% Injectable 25 Gram(s) IV Push once  donepezil 5 milliGRAM(s) Oral at bedtime  glucagon  Injectable 1 milliGRAM(s) IntraMuscular once  heparin   Injectable 5000 Unit(s) SubCutaneous every 12 hours  insulin glargine Injectable (LANTUS) 7 Unit(s) SubCutaneous every morning  insulin lispro (ADMELOG) corrective regimen sliding scale   SubCutaneous three times a day before meals  magnesium sulfate  IVPB 2 Gram(s) IV Intermittent once  pantoprazole   Suspension 40 milliGRAM(s) Oral before breakfast  polyethylene glycol 3350 17 Gram(s) Oral daily    PRN MEDICATIONS  dextrose Oral Gel 15 Gram(s) Oral once PRN  haloperidol     Tablet 0.5 milliGRAM(s) Oral at bedtime PRN                                            ------------------------------------------------------------  VITAL SIGNS: Last 24 Hours  T(C): 36.9 (20 Apr 2022 05:06), Max: 36.9 (20 Apr 2022 05:06)  T(F): 98.5 (20 Apr 2022 05:06), Max: 98.5 (20 Apr 2022 05:06)  HR: 94 (20 Apr 2022 05:06) (81 - 94)  BP: 116/61 (20 Apr 2022 05:06) (116/61 - 131/76)  BP(mean): --  RR: 18 (20 Apr 2022 05:06) (16 - 18)  SpO2: 97% (19 Apr 2022 16:00) (96% - 97%)      04-19-22 @ 07:01  -  04-20-22 @ 07:00  --------------------------------------------------------  IN: 0 mL / OUT: 0 mL / NET: 0 mL                                             --------------------------------------------------------------  LABS:                        8.4    5.27  )-----------( 420      ( 20 Apr 2022 07:35 )             26.9     04-20    138  |  102  |  13  ----------------------------<  229<H>  4.2   |  22  |  0.7    Ca    7.1<L>      20 Apr 2022 07:35  Phos  2.2     04-20  Mg     1.5     04-20    TPro  4.9<L>  /  Alb  1.8<L>  /  TBili  0.5  /  DBili  x   /  AST  20  /  ALT  24  /  AlkPhos  121<H>  04-20    PT/INR - ( 19 Apr 2022 11:00 )   PT: 14.50 sec;   INR: 1.26 ratio         PTT - ( 19 Apr 2022 11:00 )  PTT:31.1 sec                PHYSICAL EXAM:    General: NAD  LUNGS: CTAB  HEART: RRR  ABDOMEN: soft, nontender  NEURO: AAOx0                                             --------------------------------------------------------------    ASSESSMENT & PLAN      # DKA, resolved  # HAGMA, resolved  # Lactic acidosis  # ?Starvation ketoacidosis, resolving  - on admission, acidotic, BHB 8.5, UA+ketones, glucose, , AG 34, Sosm 380 (elev), a1c 9.8  - started on insulin gtt, D5+NS -- transitioned to SQI this morning  - failed SLP eval 4/14, NGT in place with feeds per Dr. Hamilton  - lactic acid 4.3-->3.1. repeat pending  - endo c/s appreciated -- PTH 21, f/u TSH, T4    #Severe alzheimer disease  - pt nonverbal, AOx0  - lives at home with daughter  - poor PO intake over past several weeks, obtunded on admission  - s/p speech eval -- NPO. NGT placed.  - GOC d/w daughter by previous team in the unit - wants PEG placed and goal to take pt home (although this is not the recommendation of the current medical team)  - GI consult placed for PEG  - Per GI patient not a candidate for percutaneous PEG, will need to be done by surgery  - Surgery on board for PEG  - PEG currently scheduled for 4/21  - Preop labs and NPO after midnight on 4/20  - Per surgery patient may need to remain intubated after the case if case is converted to open    # COVID  - Tested positive for COVID on presurgical swab  - Asymptomatic  - Continue to monitor    # Hypernatremia, RESOLVED  - Na 154->151->150-->145-->142  - free water deficit 1.6L  - free water via NGT increased to 250cc q4h  - BMP q8h to monitor for rapid correction    #Hypomagnesemia  - Magnesium trending down  - Will replete on 4/20  - Follow up repeat Mag levels    # Hypophosphatemia  - 1.8 -->2.6  - s/p repletement with 30mM KPhos IV    # Tachycardia  - sinus tachycardia  - still total volume depleted  - increased free water deficit as above    # Leukocytosis, RESOLVED  - persistent ~12k-->8k  - no fevers  - procal unremarkable  - will monitor off abx  - consider repeating cultures if febrile    # MANDA, resolved  - Cr 1.7 on admission,-- baseline ~0.7-0.9  - BUN/Cr ratio >25, suspect prerenal in setting of poor PO intake  - cont IVF    # R foot eschar  - s/p podiatry eval - local wound care, EZ boots, WBAT, no surgical intervention  - f/u LE arterial duplex read  - check MRSA  - procal 0.22  - f/u outpatient podiatry    # Positive UA  - BCx x2 NGTD  - UCx normal whitney  - UA with mod bacteria  - s/p ID eval - zosyn (hx of E. faecalis)  - ID f/u as cultures negative, pt afebrile, mild leukocytosis    PT/REHAB n/a  ACTIVITY: bedrest  DVT PPX: heparin   Injectable  GI PPX: pantoprazole  DIET: NPO w tube feeds  CODE: full       GERMANIA PAGAN 87y Male  MRN#: [id number]   CODE STATUS:________      SUBJECTIVE    No acute events overnight. Patient's mental status remains unchanged. Scheduled for PEG tube placement with surgery tomorrow.                                           ----------------------------------------------------------  OBJECTIVE  PAST MEDICAL & SURGICAL HISTORY  Prostate cancer    Dementia    Diabetes    No significant past surgical history                                              -----------------------------------------------------------  ALLERGIES:  No Known Allergies                                            ------------------------------------------------------------    HOME MEDICATIONS  Home Medications:  DONEPEZIL HCL 5 MG TABLET: 1 tab(s) orally once a day (26 Mar 2022 00:46)  Haldol 0.5 mg oral tablet: 1 tab(s) orally once a day, As Needed (02 May 2021 15:39)  Melatonin 5 mg oral tablet: 1 tab(s) orally once a day (at bedtime) (04 May 2021 16:06)                           MEDICATIONS:  STANDING MEDICATIONS  chlorhexidine 4% Liquid 1 Application(s) Topical daily  dextrose 10%. 250 milliLiter(s) IV Continuous <Continuous>  dextrose 5%. 1000 milliLiter(s) IV Continuous <Continuous>  dextrose 5%. 1000 milliLiter(s) IV Continuous <Continuous>  dextrose 50% Injectable 50 milliLiter(s) IV Push once  dextrose 50% Injectable 25 Gram(s) IV Push once  dextrose 50% Injectable 12.5 Gram(s) IV Push once  dextrose 50% Injectable 25 Gram(s) IV Push once  donepezil 5 milliGRAM(s) Oral at bedtime  glucagon  Injectable 1 milliGRAM(s) IntraMuscular once  heparin   Injectable 5000 Unit(s) SubCutaneous every 12 hours  insulin glargine Injectable (LANTUS) 7 Unit(s) SubCutaneous every morning  insulin lispro (ADMELOG) corrective regimen sliding scale   SubCutaneous three times a day before meals  magnesium sulfate  IVPB 2 Gram(s) IV Intermittent once  pantoprazole   Suspension 40 milliGRAM(s) Oral before breakfast  polyethylene glycol 3350 17 Gram(s) Oral daily    PRN MEDICATIONS  dextrose Oral Gel 15 Gram(s) Oral once PRN  haloperidol     Tablet 0.5 milliGRAM(s) Oral at bedtime PRN                                            ------------------------------------------------------------  VITAL SIGNS: Last 24 Hours  T(C): 36.9 (20 Apr 2022 05:06), Max: 36.9 (20 Apr 2022 05:06)  T(F): 98.5 (20 Apr 2022 05:06), Max: 98.5 (20 Apr 2022 05:06)  HR: 94 (20 Apr 2022 05:06) (81 - 94)  BP: 116/61 (20 Apr 2022 05:06) (116/61 - 131/76)  BP(mean): --  RR: 18 (20 Apr 2022 05:06) (16 - 18)  SpO2: 97% (19 Apr 2022 16:00) (96% - 97%)      04-19-22 @ 07:01  -  04-20-22 @ 07:00  --------------------------------------------------------  IN: 0 mL / OUT: 0 mL / NET: 0 mL                                             --------------------------------------------------------------  LABS:                        8.4    5.27  )-----------( 420      ( 20 Apr 2022 07:35 )             26.9     04-20    138  |  102  |  13  ----------------------------<  229<H>  4.2   |  22  |  0.7    Ca    7.1<L>      20 Apr 2022 07:35  Phos  2.2     04-20  Mg     1.5     04-20    TPro  4.9<L>  /  Alb  1.8<L>  /  TBili  0.5  /  DBili  x   /  AST  20  /  ALT  24  /  AlkPhos  121<H>  04-20    PT/INR - ( 19 Apr 2022 11:00 )   PT: 14.50 sec;   INR: 1.26 ratio         PTT - ( 19 Apr 2022 11:00 )  PTT:31.1 sec                PHYSICAL EXAM:    General: NAD, garbled speech   HNENT: NCAT MMM  LUNGS: CTAB, no wheezes ronchi rales  HEART: RRR no MRG   ABDOMEN: soft, nontender  NEURO: AAOx0, doesnt follow commands                                              --------------------------------------------------------------    ASSESSMENT & PLAN      # DKA, resolved  # HAGMA, resolved  # Lactic acidosis  # ?Starvation ketoacidosis, resolving  - on admission, acidotic, BHB 8.5, UA+ketones, glucose, , AG 34, Sosm 380 (elev), a1c 9.8  - started on insulin gtt, D5+NS -- transitioned to SQI this morning  - failed SLP eval 4/14, NGT in place with feeds per Dr. Hamilton  - lactic acid 4.3-->3.1. repeat pending  - endo c/s appreciated -- PTH 21, f/u TSH, T4    #Severe alzheimer disease  - pt nonverbal, AOx0  - lives at home with daughter  - poor PO intake over past several weeks, obtunded on admission  - s/p speech eval -- NPO. NGT placed.  - GOC d/w daughter by previous team in the unit - wants PEG placed and goal to take pt home (although this is not the recommendation of the current medical team)  - GI consult placed for PEG  - Per GI patient not a candidate for percutaneous PEG, will need to be done by surgery  - Surgery on board for PEG  - PEG currently scheduled for 4/21  - Preop labs and NPO after midnight on 4/20  - Per surgery patient may need to remain intubated after the case if case is converted to open    # COVID  - Tested positive for COVID on presurgical swab  - Asymptomatic  - Continue to monitor    # Hypernatremia, RESOLVED  - Na 154->151->150-->145-->142  - free water deficit 1.6L  - free water via NGT increased to 250cc q4h  - BMP q8h to monitor for rapid correction    #Hypomagnesemia  - Magnesium trending down  - Will replete on 4/20  - Follow up repeat Mag levels    # Hypophosphatemia  - 1.8 -->2.6  - s/p repletement with 30mM KPhos IV    # Tachycardia  - sinus tachycardia  - still total volume depleted  - increased free water deficit as above    # Leukocytosis, RESOLVED  - persistent ~12k-->8k  - no fevers  - procal unremarkable  - will monitor off abx  - consider repeating cultures if febrile    # MANDA, resolved  - Cr 1.7 on admission,-- baseline ~0.7-0.9  - BUN/Cr ratio >25, suspect prerenal in setting of poor PO intake  - cont IVF    # R foot eschar  - s/p podiatry eval - local wound care, EZ boots, WBAT, no surgical intervention  - f/u LE arterial duplex read  - check MRSA  - procal 0.22  - f/u outpatient podiatry    # Positive UA  - BCx x2 NGTD  - UCx normal whitney  - UA with mod bacteria  - s/p ID eval - zosyn (hx of E. faecalis)  - ID f/u as cultures negative, pt afebrile, mild leukocytosis    PT/REHAB n/a  ACTIVITY: bedrest  DVT PPX: heparin   Injectable  GI PPX: pantoprazole  DIET: NPO w tube feeds  CODE: full

## 2022-04-21 ENCOUNTER — TRANSCRIPTION ENCOUNTER (OUTPATIENT)
Age: 87
End: 2022-04-21

## 2022-04-21 VITALS
TEMPERATURE: 100 F | SYSTOLIC BLOOD PRESSURE: 112 MMHG | DIASTOLIC BLOOD PRESSURE: 58 MMHG | HEART RATE: 86 BPM | RESPIRATION RATE: 18 BRPM

## 2022-04-21 LAB
ALBUMIN SERPL ELPH-MCNC: 2 G/DL — LOW (ref 3.5–5.2)
ALBUMIN SERPL ELPH-MCNC: 2 G/DL — LOW (ref 3.5–5.2)
ALP SERPL-CCNC: 122 U/L — HIGH (ref 30–115)
ALP SERPL-CCNC: 132 U/L — HIGH (ref 30–115)
ALT FLD-CCNC: 21 U/L — SIGNIFICANT CHANGE UP (ref 0–41)
ALT FLD-CCNC: 24 U/L — SIGNIFICANT CHANGE UP (ref 0–41)
ANION GAP SERPL CALC-SCNC: 11 MMOL/L — SIGNIFICANT CHANGE UP (ref 7–14)
ANION GAP SERPL CALC-SCNC: 12 MMOL/L — SIGNIFICANT CHANGE UP (ref 7–14)
AST SERPL-CCNC: 15 U/L — SIGNIFICANT CHANGE UP (ref 0–41)
AST SERPL-CCNC: 21 U/L — SIGNIFICANT CHANGE UP (ref 0–41)
BASOPHILS # BLD AUTO: 0.03 K/UL — SIGNIFICANT CHANGE UP (ref 0–0.2)
BASOPHILS NFR BLD AUTO: 0.5 % — SIGNIFICANT CHANGE UP (ref 0–1)
BILIRUB SERPL-MCNC: 0.4 MG/DL — SIGNIFICANT CHANGE UP (ref 0.2–1.2)
BILIRUB SERPL-MCNC: 0.4 MG/DL — SIGNIFICANT CHANGE UP (ref 0.2–1.2)
BLD GP AB SCN SERPL QL: SIGNIFICANT CHANGE UP
BUN SERPL-MCNC: 12 MG/DL — SIGNIFICANT CHANGE UP (ref 10–20)
BUN SERPL-MCNC: 14 MG/DL — SIGNIFICANT CHANGE UP (ref 10–20)
CALCIUM SERPL-MCNC: 7.4 MG/DL — LOW (ref 8.5–10.1)
CALCIUM SERPL-MCNC: 7.5 MG/DL — LOW (ref 8.5–10.1)
CHLORIDE SERPL-SCNC: 103 MMOL/L — SIGNIFICANT CHANGE UP (ref 98–110)
CHLORIDE SERPL-SCNC: 105 MMOL/L — SIGNIFICANT CHANGE UP (ref 98–110)
CO2 SERPL-SCNC: 23 MMOL/L — SIGNIFICANT CHANGE UP (ref 17–32)
CO2 SERPL-SCNC: 24 MMOL/L — SIGNIFICANT CHANGE UP (ref 17–32)
CREAT SERPL-MCNC: 0.7 MG/DL — SIGNIFICANT CHANGE UP (ref 0.7–1.5)
CREAT SERPL-MCNC: 0.8 MG/DL — SIGNIFICANT CHANGE UP (ref 0.7–1.5)
EGFR: 86 ML/MIN/1.73M2 — SIGNIFICANT CHANGE UP
EGFR: 89 ML/MIN/1.73M2 — SIGNIFICANT CHANGE UP
EOSINOPHIL # BLD AUTO: 0.1 K/UL — SIGNIFICANT CHANGE UP (ref 0–0.7)
EOSINOPHIL NFR BLD AUTO: 1.6 % — SIGNIFICANT CHANGE UP (ref 0–8)
GLUCOSE BLDC GLUCOMTR-MCNC: 216 MG/DL — HIGH (ref 70–99)
GLUCOSE BLDC GLUCOMTR-MCNC: 280 MG/DL — HIGH (ref 70–99)
GLUCOSE SERPL-MCNC: 169 MG/DL — HIGH (ref 70–99)
GLUCOSE SERPL-MCNC: 301 MG/DL — HIGH (ref 70–99)
HCT VFR BLD CALC: 27.9 % — LOW (ref 42–52)
HGB BLD-MCNC: 8.8 G/DL — LOW (ref 14–18)
IMM GRANULOCYTES NFR BLD AUTO: 0.5 % — HIGH (ref 0.1–0.3)
LYMPHOCYTES # BLD AUTO: 1.08 K/UL — LOW (ref 1.2–3.4)
LYMPHOCYTES # BLD AUTO: 17.3 % — LOW (ref 20.5–51.1)
MAGNESIUM SERPL-MCNC: 1.9 MG/DL — SIGNIFICANT CHANGE UP (ref 1.8–2.4)
MAGNESIUM SERPL-MCNC: 2 MG/DL — SIGNIFICANT CHANGE UP (ref 1.8–2.4)
MCHC RBC-ENTMCNC: 27 PG — SIGNIFICANT CHANGE UP (ref 27–31)
MCHC RBC-ENTMCNC: 31.5 G/DL — LOW (ref 32–37)
MCV RBC AUTO: 85.6 FL — SIGNIFICANT CHANGE UP (ref 80–94)
MONOCYTES # BLD AUTO: 0.47 K/UL — SIGNIFICANT CHANGE UP (ref 0.1–0.6)
MONOCYTES NFR BLD AUTO: 7.5 % — SIGNIFICANT CHANGE UP (ref 1.7–9.3)
NEUTROPHILS # BLD AUTO: 4.52 K/UL — SIGNIFICANT CHANGE UP (ref 1.4–6.5)
NEUTROPHILS NFR BLD AUTO: 72.6 % — SIGNIFICANT CHANGE UP (ref 42.2–75.2)
NRBC # BLD: 0 /100 WBCS — SIGNIFICANT CHANGE UP (ref 0–0)
PHOSPHATE SERPL-MCNC: 2.2 MG/DL — SIGNIFICANT CHANGE UP (ref 2.1–4.9)
PHOSPHATE SERPL-MCNC: 3.3 MG/DL — SIGNIFICANT CHANGE UP (ref 2.1–4.9)
PLATELET # BLD AUTO: 384 K/UL — SIGNIFICANT CHANGE UP (ref 130–400)
PLATELET # BLD AUTO: 703 K/UL — HIGH (ref 130–400)
POTASSIUM SERPL-MCNC: 4.5 MMOL/L — SIGNIFICANT CHANGE UP (ref 3.5–5)
POTASSIUM SERPL-MCNC: 4.7 MMOL/L — SIGNIFICANT CHANGE UP (ref 3.5–5)
POTASSIUM SERPL-SCNC: 4.5 MMOL/L — SIGNIFICANT CHANGE UP (ref 3.5–5)
POTASSIUM SERPL-SCNC: 4.7 MMOL/L — SIGNIFICANT CHANGE UP (ref 3.5–5)
PROT SERPL-MCNC: 5.2 G/DL — LOW (ref 6–8)
PROT SERPL-MCNC: 5.5 G/DL — LOW (ref 6–8)
RBC # BLD: 3.26 M/UL — LOW (ref 4.7–6.1)
RBC # FLD: 19.3 % — HIGH (ref 11.5–14.5)
SODIUM SERPL-SCNC: 138 MMOL/L — SIGNIFICANT CHANGE UP (ref 135–146)
SODIUM SERPL-SCNC: 140 MMOL/L — SIGNIFICANT CHANGE UP (ref 135–146)
WBC # BLD: 6.23 K/UL — SIGNIFICANT CHANGE UP (ref 4.8–10.8)
WBC # FLD AUTO: 6.23 K/UL — SIGNIFICANT CHANGE UP (ref 4.8–10.8)

## 2022-04-21 PROCEDURE — 99232 SBSQ HOSP IP/OBS MODERATE 35: CPT

## 2022-04-21 PROCEDURE — 99239 HOSP IP/OBS DSCHRG MGMT >30: CPT

## 2022-04-21 RX ORDER — INSULIN LISPRO 100/ML
5 VIAL (ML) SUBCUTANEOUS
Qty: 15 | Refills: 0
Start: 2022-04-21 | End: 2022-05-20

## 2022-04-21 RX ORDER — POLYETHYLENE GLYCOL 3350 17 G/17G
17 POWDER, FOR SOLUTION ORAL
Qty: 119 | Refills: 0
Start: 2022-04-21 | End: 2022-04-27

## 2022-04-21 RX ADMIN — CHLORHEXIDINE GLUCONATE 1 APPLICATION(S): 213 SOLUTION TOPICAL at 13:10

## 2022-04-21 RX ADMIN — Medication 3: at 08:31

## 2022-04-21 RX ADMIN — PANTOPRAZOLE SODIUM 40 MILLIGRAM(S): 20 TABLET, DELAYED RELEASE ORAL at 07:17

## 2022-04-21 RX ADMIN — HEPARIN SODIUM 5000 UNIT(S): 5000 INJECTION INTRAVENOUS; SUBCUTANEOUS at 05:17

## 2022-04-21 RX ADMIN — SODIUM CHLORIDE 100 MILLILITER(S): 9 INJECTION, SOLUTION INTRAVENOUS at 00:06

## 2022-04-21 RX ADMIN — Medication 62.5 MILLIMOLE(S): at 05:17

## 2022-04-21 NOTE — DISCHARGE NOTE PROVIDER - HOSPITAL COURSE
86 y/o male with a PMH of uncontrolled DM, prostate ca in remission, and late stage alzheimer's presents to the ED for evaluation of failure to thrive. pt is a poor historian, and [place]hi speaking. I spoke with pt daughter Isabelle who reports pt lives home and has not been eating or drinking since he left the hospital in march 2022 ( few weeks ago). She reports that the pt was recently admitted 03/25-03/29 for hypoglycemia and pathological hip fracture. she reports pt is at baseline, and baseline is confused. pt daughter reports she placed a flores in him at home yesterday and she is a nurse.    Pt was found to be in DKA/HHS w/ ?starvation ketoacidosis. Pt was started on insulin gtt. Pt was also found to have hypernatremia. Per daughter's wishes, NGT was placed 4/14 and tube feeds were initiated. Pt was transitioned to SQI this morning. Pt's IVF were changed to D5@70/hr this morning and free water flushes were added. Please follow-up BMP ATC to monitor Na to ensure no overcorrection. Consider d/c D5 when Na reach 145. GOC discussed with daughter, goal is for pt to receive PEG tube and for daughter to take pt home. Pt presented with flores from home, TOV initiated today.      86 y/o male with a PMH of uncontrolled DM, prostate ca in remission, and late stage alzheimer's presents to the ED for evaluation of failure to thrive. pt is a poor historian, and [place] speaking. I spoke with pt daughter Isabelle who reports pt lives home and has not been eating or drinking since he left the hospital in march 2022 ( few weeks ago). She reports that the pt was recently admitted 03/25-03/29 for hypoglycemia and pathological hip fracture. she reports pt is at baseline, and baseline is confused. pt daughter reports she placed a flores in him at home and she is a nurse.    Pt was found to be in DKA/HHS w/ ?starvation ketoacidosis. Pt was started on insulin gtt. Pt was also found to have hypernatremia. Per daughter's wishes, NGT was placed 4/14 and tube feeds were initiated. Pt was transitioned to SQI. Pt's IVF were changed to D5@70/hr and free water flushes were added. Patient's labs were trended to make sure the hypernatremia resolved without overcorrection. The patient's hypernatremia normalized. The patient failed multiple speech and swallow evaluations. A discussion was had with the family and the decision was made to place an NGT in the interim and plan for a PEG. Surgery and GI evaluated the patient and decided to take the patient to the OR on 4/21 for a PEG. The patient's daughter decided not to proceed with a PEG on the morning of 4/21 and wished to proceed with hospice care instead. The PEG was canceled and preparations were began for hospice care. The patient will be discharged to home with hospice care.

## 2022-04-21 NOTE — DISCHARGE NOTE PROVIDER - NSDCCPCAREPLAN_GEN_ALL_CORE_FT
PRINCIPAL DISCHARGE DIAGNOSIS  Diagnosis: Failure to thrive in adult  Assessment and Plan of Treatment: The patient did not recover well from his prior illness. His health has steadily worsened and he was requiring assistance with all activities and would have needed a feeding tube permanently. A decision was made by the family to make the patient hospice care.      SECONDARY DISCHARGE DIAGNOSES  Diagnosis: Hyperosmolar hyperglycemic state (HHS)  Assessment and Plan of Treatment:

## 2022-04-21 NOTE — DISCHARGE NOTE NURSING/CASE MANAGEMENT/SOCIAL WORK - NSDCPEFALRISK_GEN_ALL_CORE
For information on Fall & Injury Prevention, visit: [email or web address] OR  [email or web address] OR  https://www.cdc.gov/steadi/patient.html

## 2022-04-21 NOTE — PROGRESS NOTE ADULT - SUBJECTIVE AND OBJECTIVE BOX
GENERAL SURGERY PROGRESS NOTE    Patient: GERMANIA PAGAN , 87y ([birth year])Male   MRN: [id number]  Location: [address]  Visit: 04-13-22 Inpatient  Date: 04-21-22 @ 10:49    Hospital Day #: 9    Events of past 24 hours: no acute events overnight    PAST MEDICAL & SURGICAL HISTORY:  Prostate cancer    Dementia    Diabetes    No significant past surgical history        Vitals:   T(F): 100.2 (04-21-22 @ 06:15), Max: 100.2 (04-21-22 @ 06:15)  HR: 86 (04-21-22 @ 06:15)  BP: 112/58 (04-21-22 @ 06:15)  RR: 18 (04-21-22 @ 06:15)  SpO2: --      Diet, NPO after Midnight:      NPO Start Date: 20-Apr-2022,   NPO Start Time: 23:59  Diet, NPO with Tube Feed:   Tube Feeding Modality: Nasogastric  Glucerna 1.2 Karan  Total Volume for 24 Hours (mL): 720  Bolus  Total Volume of Bolus (mL):  240  Tube Feed Frequency: Every 8 hours   Tube Feed Start Time: 06:00  Bolus Feed Rate (mL per Hour): 480   Bolus Feed Duration (in Hours): 0.5  Free Water Flush  Bolus   Total Volume per Flush (mL): 250   Frequency: Every 4 Hours      Fluids: lactated ringers.: Solution, 1000 milliLiter(s) infuse at 100 mL/Hr  Special Instructions: for NPO after midnight      PHYSICAL EXAM:  General: NAD  HEENT: NCAT, Trachea ML, Neck supple  Cardiac: RRR S1, S2  Respiratory: CTAB, normal respiratory effort, breath sounds equal BL  Abdomen: Soft, non-distended, non-tender  Skin: Warm/dry, normal color, no jaundice    MEDICATIONS  (STANDING):  chlorhexidine 4% Liquid 1 Application(s) Topical daily  dextrose 5%. 1000 milliLiter(s) (100 mL/Hr) IV Continuous <Continuous>  dextrose 5%. 1000 milliLiter(s) (50 mL/Hr) IV Continuous <Continuous>  dextrose 50% Injectable 25 Gram(s) IV Push once  dextrose 50% Injectable 12.5 Gram(s) IV Push once  dextrose 50% Injectable 25 Gram(s) IV Push once  donepezil 5 milliGRAM(s) Oral at bedtime  glucagon  Injectable 1 milliGRAM(s) IntraMuscular once  heparin   Injectable 5000 Unit(s) SubCutaneous every 12 hours  insulin glargine Injectable (LANTUS) 7 Unit(s) SubCutaneous every morning  insulin lispro (ADMELOG) corrective regimen sliding scale   SubCutaneous three times a day before meals  lactated ringers. 1000 milliLiter(s) (100 mL/Hr) IV Continuous <Continuous>  pantoprazole   Suspension 40 milliGRAM(s) Oral before breakfast  polyethylene glycol 3350 17 Gram(s) Oral daily    MEDICATIONS  (PRN):  dextrose Oral Gel 15 Gram(s) Oral once PRN Blood Glucose LESS THAN 70 milliGRAM(s)/deciliter  haloperidol     Tablet 0.5 milliGRAM(s) Oral at bedtime PRN Agitation/Confusion      DVT PROPHYLAXIS: heparin   Injectable 5000 Unit(s) SubCutaneous every 12 hours    GI PROPHYLAXIS: pantoprazole   Suspension 40 milliGRAM(s) Oral before breakfast    LAB/STUDIES:  Labs:  CAPILLARY BLOOD GLUCOSE      POCT Blood Glucose.: 280 mg/dL (21 Apr 2022 07:32)  POCT Blood Glucose.: 189 mg/dL (20 Apr 2022 21:16)  POCT Blood Glucose.: 281 mg/dL (20 Apr 2022 16:37)  POCT Blood Glucose.: 249 mg/dL (20 Apr 2022 11:12)                          8.8    6.23  )-----------( 384      ( 21 Apr 2022 07:49 )             27.9       Auto Neutrophil %: 72.6 % (04-21-22 @ 07:49)  Auto Immature Granulocyte %: 0.5 % (04-21-22 @ 07:49)  Auto Neutrophil %: 66.7 % (04-20-22 @ 20:00)  Auto Immature Granulocyte %: 0.6 % (04-20-22 @ 20:00)    04-21    138  |  103  |  12  ----------------------------<  301<H>  4.5   |  24  |  0.7      Calcium, Total Serum: 7.4 mg/dL (04-21-22 @ 07:49)      LFTs:             5.2  | 0.4  | 15       ------------------[122     ( 21 Apr 2022 07:49 )  2.0  | x    | 21            Coags:     13.10  ----< 1.14    ( 20 Apr 2022 20:00 )     27.6

## 2022-04-21 NOTE — CHART NOTE - NSCHARTNOTEFT_GEN_A_CORE
Pre-Op Note    Patient: GERMANIA PAGAN  MRN: [id number]  87y Male  Location: [address]  22 @ 00:12    Admit Diagnosis: ADULT FAILURE TO THRIVE; HYPEROSMOLAR HYPERGLYCEMIC STATE (HHS)    Procedure: PEG tube  Consent in Chart: [x] Yes [  ] No  Diet: Diet, NPO after Midnight:      NPO Start Date: 2022,   NPO Start Time: 23:59 (22 @ 18:54)    Fluids: dextrose 5%. 1000 milliLiter(s) (100 mL/Hr) IV Continuous <Continuous>  dextrose 5%. 1000 milliLiter(s) (50 mL/Hr) IV Continuous <Continuous>  lactated ringers. 1000 milliLiter(s) (100 mL/Hr) IV Continuous <Continuous>  sodium phosphate IVPB 15 milliMole(s) IV Intermittent once    EK Lead ECG:   Ventricular Rate 108 BPM    Atrial Rate 108 BPM    P-R Interval 114 ms    QRS Duration 112 ms    Q-T Interval 374 ms    QTC Calculation(Bazett) 501 ms    P Axis 76 degrees    R Axis -16 degrees    T Axis 136 degrees    Diagnosis Line Sinus tachycardia  Right bundle branch block  T wave abnormality, consider lateral ischemia  Abnormal ECG    Confirmed by DEX JORGE MD ([id number]) on 2022 11:18:50 PM (22 @ 20:45)    CXR:  Xray Chest 1 View- PORTABLE-Urgent:   ACC: [id number] EXAM:  XR CHEST PORTABLE URGENT 1V                          PROCEDURE DATE:  2022          INTERPRETATION:  INDICATION: Nasogastric tube placement.    TECHNIQUE/POSITIONING: Single frontal view of the chest.    COMPARISON: Chestradiograph 4/15/2022    FINDINGS/  IMPRESSION:    SUPPORT DEVICES: Feeding tube coursing into the abdomen below the   field-of-view.    CARDIAC/MEDIASTINUM/HILUM: Stable.    LUNG PARENCHYMA/PLEURA: Patient chin obscures evaluation of the lung   apices. No focal pulmonary consolidation. No definite pneumothorax    SKELETON/SOFT TISSUES: Stable.    --- End of Report ---          STEPHANIE MARI MD; Resident Radiologist  This document has been electronically signed.  JUANITA SEGURA MD; Attending Radiologist  This document has been electronically signed. 2022  8:55AM (22 @ 22:07)      Vitals:  T(F): 96.6 (22 @ 20:28), Max: 98.5 (22 @ 05:06)  HR: 99 (22 @ 22:14) (78 - 99)  BP: 130/62 (22 @ 22:14) (116/61 - 130/62)  RR: 18 (22 @ 22:14) (18 - 18)  SpO2: --    Pre-OP Labs:  CAPILLARY BLOOD GLUCOSE      POCT Blood Glucose.: 189 mg/dL (2022 21:16)  POCT Blood Glucose.: 281 mg/dL (2022 16:37)  POCT Blood Glucose.: 249 mg/dL (2022 11:12)  POCT Blood Glucose.: 235 mg/dL (2022 08:47)                          9.3    5.31  )-----------( x        ( 2022 20:00 )             29.5         140  |  105  |  14  ----------------------------<  169<H>  4.7   |  23  |  0.8    Ca    7.5<L>      2022 20:00  Phos  2.2       Mg     2.0         TPro  5.5<L>  /  Alb  2.0<L>  /  TBili  0.4  /  DBili  x   /  AST  21  /  ALT  24  /  AlkPhos  132<H>      PT/INR - ( 2022 20:00 )   PT: 13.10 sec;   INR: 1.14 ratio         PTT - ( 2022 20:00 )  PTT:27.6 sec      Type & Screen: ABO RH Interpretation: O POS (22 @ 11:00)    COVID: COVID-19 PCR: NotDetec (2022 15:33)  COVID-19 PCR: Detected (2022 19:33)  COVID-19 PCR: NotDetec (2022 18:49)  COVID-19 PCR: NotDetec (25 Mar 2022 16:34)

## 2022-04-21 NOTE — PROGRESS NOTE ADULT - PROVIDER SPECIALTY LIST ADULT
Gastroenterology
Internal Medicine
Critical Care
Critical Care
Internal Medicine
Internal Medicine
Pulmonology
Surgery
Surgery
Infectious Disease
Infectious Disease
Internal Medicine
Surgery
Hospitalist

## 2022-04-21 NOTE — PROGRESS NOTE ADULT - ASSESSMENT
ASSESSMENT:  88 y/o male with a PMH of uncontrolled DM, prostate ca in remission, and late stage alzheimer's presents to the ED for evaluation of failure to thrive. Surgery is being consulted now for placement of a PEG tube vs. surgical gastrostomy tube as both GI and IR have said there is no window     PLAN:  - Management per primary team  - OR today for PEG placement, possible laparoscopic, possible open gastrostomy tube placement     [phone]

## 2022-04-21 NOTE — DISCHARGE NOTE PROVIDER - ATTENDING DISCHARGE PHYSICAL EXAMINATION:
General: NAD, garbled speech   HNENT: NCAT MMM  LUNGS: CTAB, no wheezes ronchi rales  HEART: RRR no MRG   ABDOMEN: soft, nontender  NEURO: AAOx0, doesnt follow commands

## 2022-04-21 NOTE — CHART NOTE - NSCHARTNOTESELECT_GEN_ALL_CORE
Palliative Care SW Initial Assessment/Event Note
Event Note
Nutrition/Event Note
Pre-Op
Transfer Note

## 2022-04-21 NOTE — DISCHARGE NOTE PROVIDER - NSDCMRMEDTOKEN_GEN_ALL_CORE_FT
Admelog SoloStar 100 units/mL injectable solution: 5 unit(s) subcutaneous 3 times a day (with meals)   DONEPEZIL HCL 5 MG TABLET: 1 tab(s) orally once a day  glucometer (per patient&#x27;s insurance): Test blood sugars four times a day. Dispense #1 glucometer.  Haldol 0.5 mg oral tablet: 1 tab(s) orally once a day, As Needed  Insulin Pen Needles, 4mm: 1 application subcutaneously 4 times a day. ** Use with insulin pen **   lancets: 1 application subcutaneously 4 times a day   Lantus Solostar Pen 100 units/mL subcutaneous solution: 15 unit(s) subcutaneous once a day (at bedtime)   Melatonin 5 mg oral tablet: 1 tab(s) orally once a day (at bedtime)  polyethylene glycol 3350 oral powder for reconstitution: 17 gram(s) orally once a day  test strips (per patient&#x27;s insurance): 1 application subcutaneously 4 times a day. ** Compatible with patient&#x27;s glucometer **   Admelog SoloStar 100 units/mL injectable solution: 2-3 unit(s) subcutaneous 3 times a day (with meals if the patient is able to eat a meal), monitor fingerstick glucose levels at mealtimes before administering  DONEPEZIL HCL 5 MG TABLET: 1 tab(s) orally once a day  glucometer (per patient&#x27;s insurance): Test blood sugars four times a day. Dispense #1 glucometer.  Haldol 0.5 mg oral tablet: 1 tab(s) orally once a day, As Needed  Insulin Pen Needles, 4mm: 1 application subcutaneously 4 times a day. ** Use with insulin pen **   lancets: 1 application subcutaneously 4 times a day   Melatonin 5 mg oral tablet: 1 tab(s) orally once a day (at bedtime)  polyethylene glycol 3350 oral powder for reconstitution: 17 gram(s) orally once a day as needed  test strips (per patient&#x27;s insurance): 1 application subcutaneously 4 times a day. ** Compatible with patient&#x27;s glucometer **

## 2022-04-21 NOTE — DISCHARGE NOTE NURSING/CASE MANAGEMENT/SOCIAL WORK - PATIENT PORTAL LINK FT
You can access the FollowMyHealth Patient Portal offered by [facility] by registering at the following website: [email or web address]. By joining [employer]’s FollowMyHealth portal, you will also be able to view your health information using other applications (apps) compatible with our system.

## 2022-05-02 DIAGNOSIS — I96 GANGRENE, NOT ELSEWHERE CLASSIFIED: ICD-10-CM

## 2022-05-02 DIAGNOSIS — L89.152 PRESSURE ULCER OF SACRAL REGION, STAGE 2: ICD-10-CM

## 2022-05-02 DIAGNOSIS — E11.649 TYPE 2 DIABETES MELLITUS WITH HYPOGLYCEMIA WITHOUT COMA: ICD-10-CM

## 2022-05-02 DIAGNOSIS — J98.11 ATELECTASIS: ICD-10-CM

## 2022-05-02 DIAGNOSIS — G30.1 ALZHEIMER'S DISEASE WITH LATE ONSET: ICD-10-CM

## 2022-05-02 DIAGNOSIS — E83.42 HYPOMAGNESEMIA: ICD-10-CM

## 2022-05-02 DIAGNOSIS — Z79.4 LONG TERM (CURRENT) USE OF INSULIN: ICD-10-CM

## 2022-05-02 DIAGNOSIS — N17.9 ACUTE KIDNEY FAILURE, UNSPECIFIED: ICD-10-CM

## 2022-05-02 DIAGNOSIS — R53.2 FUNCTIONAL QUADRIPLEGIA: ICD-10-CM

## 2022-05-02 DIAGNOSIS — E83.39 OTHER DISORDERS OF PHOSPHORUS METABOLISM: ICD-10-CM

## 2022-05-02 DIAGNOSIS — F02.80 DEMENTIA IN OTHER DISEASES CLASSIFIED ELSEWHERE, UNSPECIFIED SEVERITY, WITHOUT BEHAVIORAL DISTURBANCE, PSYCHOTIC DISTURBANCE, MOOD DISTURBANCE, AND ANXIETY: ICD-10-CM

## 2022-05-02 DIAGNOSIS — E87.0 HYPEROSMOLALITY AND HYPERNATREMIA: ICD-10-CM

## 2022-05-02 DIAGNOSIS — Z78.1 PHYSICAL RESTRAINT STATUS: ICD-10-CM

## 2022-05-02 DIAGNOSIS — R65.10 SYSTEMIC INFLAMMATORY RESPONSE SYNDROME (SIRS) OF NON-INFECTIOUS ORIGIN WITHOUT ACUTE ORGAN DYSFUNCTION: ICD-10-CM

## 2022-05-02 DIAGNOSIS — L97.519 NON-PRESSURE CHRONIC ULCER OF OTHER PART OF RIGHT FOOT WITH UNSPECIFIED SEVERITY: ICD-10-CM

## 2022-05-02 DIAGNOSIS — U07.1 COVID-19: ICD-10-CM

## 2022-05-02 DIAGNOSIS — E46 UNSPECIFIED PROTEIN-CALORIE MALNUTRITION: ICD-10-CM

## 2022-05-02 DIAGNOSIS — E11.10 TYPE 2 DIABETES MELLITUS WITH KETOACIDOSIS WITHOUT COMA: ICD-10-CM

## 2022-05-02 DIAGNOSIS — M84.651K: ICD-10-CM

## 2022-05-02 DIAGNOSIS — R00.0 TACHYCARDIA, UNSPECIFIED: ICD-10-CM

## 2022-05-02 DIAGNOSIS — R62.7 ADULT FAILURE TO THRIVE: ICD-10-CM

## 2022-05-02 DIAGNOSIS — Z85.46 PERSONAL HISTORY OF MALIGNANT NEOPLASM OF PROSTATE: ICD-10-CM

## 2022-05-02 DIAGNOSIS — Z51.5 ENCOUNTER FOR PALLIATIVE CARE: ICD-10-CM

## 2023-04-15 NOTE — ED PROVIDER NOTE - CHIEF COMPLAINT
Spontaneous, unlabored and symmetrical The patient is a 86y Male complaining of altered mental status.

## 2024-01-01 NOTE — ED PROVIDER NOTE - CLINICAL SUMMARY MEDICAL DECISION MAKING FREE TEXT BOX
2024
Full DC instructions discussed and patient knows when to seek immediate medical attention. Patient has proper follow-up. All results discussed with the patient they may require further work-up. Limitations of ED work-up discussed. All  questions and concerns from patient or family addressed. Understanding of insturctions verbalized
